# Patient Record
Sex: FEMALE | Race: BLACK OR AFRICAN AMERICAN | NOT HISPANIC OR LATINO | Employment: FULL TIME | URBAN - METROPOLITAN AREA
[De-identification: names, ages, dates, MRNs, and addresses within clinical notes are randomized per-mention and may not be internally consistent; named-entity substitution may affect disease eponyms.]

---

## 2018-08-03 ENCOUNTER — OFFICE VISIT (OUTPATIENT)
Dept: FAMILY MEDICINE | Facility: CLINIC | Age: 48
End: 2018-08-03
Payer: COMMERCIAL

## 2018-08-03 ENCOUNTER — LAB VISIT (OUTPATIENT)
Dept: LAB | Facility: HOSPITAL | Age: 48
End: 2018-08-03
Attending: INTERNAL MEDICINE
Payer: COMMERCIAL

## 2018-08-03 VITALS
HEART RATE: 72 BPM | DIASTOLIC BLOOD PRESSURE: 76 MMHG | RESPIRATION RATE: 17 BRPM | BODY MASS INDEX: 26.82 KG/M2 | WEIGHT: 145.75 LBS | SYSTOLIC BLOOD PRESSURE: 100 MMHG | HEIGHT: 62 IN | OXYGEN SATURATION: 98 % | TEMPERATURE: 98 F

## 2018-08-03 DIAGNOSIS — Z12.4 SCREENING FOR CERVICAL CANCER: ICD-10-CM

## 2018-08-03 DIAGNOSIS — Z13.220 SCREENING FOR HYPERLIPIDEMIA: ICD-10-CM

## 2018-08-03 DIAGNOSIS — E03.9 ACQUIRED HYPOTHYROIDISM: ICD-10-CM

## 2018-08-03 DIAGNOSIS — Z00.00 PREVENTATIVE HEALTH CARE: Primary | ICD-10-CM

## 2018-08-03 DIAGNOSIS — G43.829 MENSTRUAL MIGRAINE WITHOUT STATUS MIGRAINOSUS, NOT INTRACTABLE: ICD-10-CM

## 2018-08-03 DIAGNOSIS — Z12.39 SCREENING FOR BREAST CANCER: ICD-10-CM

## 2018-08-03 LAB
ALBUMIN SERPL BCP-MCNC: 4 G/DL
ALP SERPL-CCNC: 69 U/L
ALT SERPL W/O P-5'-P-CCNC: 18 U/L
ANION GAP SERPL CALC-SCNC: 9 MMOL/L
AST SERPL-CCNC: 28 U/L
BILIRUB SERPL-MCNC: 0.7 MG/DL
BUN SERPL-MCNC: 20 MG/DL
CALCIUM SERPL-MCNC: 9.6 MG/DL
CHLORIDE SERPL-SCNC: 109 MMOL/L
CHOLEST SERPL-MCNC: 241 MG/DL
CHOLEST/HDLC SERPL: 2.8 {RATIO}
CO2 SERPL-SCNC: 21 MMOL/L
CREAT SERPL-MCNC: 1 MG/DL
EST. GFR  (AFRICAN AMERICAN): >60 ML/MIN/1.73 M^2
EST. GFR  (NON AFRICAN AMERICAN): >60 ML/MIN/1.73 M^2
GLUCOSE SERPL-MCNC: 75 MG/DL
HDLC SERPL-MCNC: 87 MG/DL
HDLC SERPL: 36.1 %
LDLC SERPL CALC-MCNC: 142.2 MG/DL
NONHDLC SERPL-MCNC: 154 MG/DL
POTASSIUM SERPL-SCNC: 4 MMOL/L
PROT SERPL-MCNC: 8.5 G/DL
SODIUM SERPL-SCNC: 139 MMOL/L
TRIGL SERPL-MCNC: 59 MG/DL
TSH SERPL DL<=0.005 MIU/L-ACNC: 1.23 UIU/ML

## 2018-08-03 PROCEDURE — 80053 COMPREHEN METABOLIC PANEL: CPT

## 2018-08-03 PROCEDURE — 84443 ASSAY THYROID STIM HORMONE: CPT

## 2018-08-03 PROCEDURE — 80061 LIPID PANEL: CPT

## 2018-08-03 PROCEDURE — 99999 PR PBB SHADOW E&M-NEW PATIENT-LVL IV: CPT | Mod: PBBFAC,,, | Performed by: INTERNAL MEDICINE

## 2018-08-03 PROCEDURE — 36415 COLL VENOUS BLD VENIPUNCTURE: CPT | Mod: PN

## 2018-08-03 PROCEDURE — 99386 PREV VISIT NEW AGE 40-64: CPT | Mod: S$GLB,,, | Performed by: INTERNAL MEDICINE

## 2018-08-03 RX ORDER — SUMATRIPTAN 50 MG/1
50 TABLET, FILM COATED ORAL
COMMUNITY
End: 2018-08-03 | Stop reason: SDUPTHER

## 2018-08-03 RX ORDER — LEVOTHYROXINE SODIUM 175 UG/1
175 TABLET ORAL DAILY
COMMUNITY
End: 2018-08-07 | Stop reason: SDUPTHER

## 2018-08-03 RX ORDER — SERTRALINE HYDROCHLORIDE 100 MG/1
100 TABLET, FILM COATED ORAL DAILY
COMMUNITY
End: 2019-09-20 | Stop reason: SDUPTHER

## 2018-08-03 RX ORDER — SUMATRIPTAN 50 MG/1
50 TABLET, FILM COATED ORAL
Qty: 21 TABLET | Refills: 3 | Status: SHIPPED | OUTPATIENT
Start: 2018-08-03 | End: 2018-12-05 | Stop reason: SDUPTHER

## 2018-08-03 RX ORDER — TOPIRAMATE 100 MG/1
100 TABLET, FILM COATED ORAL 2 TIMES DAILY
COMMUNITY
End: 2019-09-18 | Stop reason: HOSPADM

## 2018-08-03 NOTE — PROGRESS NOTES
"Subjective:       Patient ID: Magalis Rosales is a 47 y.o. female.    Chief Complaint: Establish Care    Est care    HPI: 48 y/o with hypothyroid (on same thyroid dose x 10 years no history of thyroid surgery, biopsy or radiation) migraine headache (two to four times per month on daily topirmate) presents to establish pcp. Feels migraines are well controlled with daily and as needed medication. Pain from migraine unilateral periauricluar. No aura. +photophobia +nausea. Notes menses and allergies as triggers.       Review of Systems   Constitutional: Negative for activity change, appetite change, fatigue, fever and unexpected weight change.   HENT: Negative for ear pain, rhinorrhea and sore throat.    Eyes: Negative for discharge and visual disturbance.   Respiratory: Negative for chest tightness, shortness of breath and wheezing.    Cardiovascular: Negative for chest pain, palpitations and leg swelling.   Gastrointestinal: Negative for abdominal pain, constipation and diarrhea.   Endocrine: Negative for cold intolerance and heat intolerance.   Genitourinary: Negative for dysuria and hematuria.   Musculoskeletal: Negative for joint swelling and neck stiffness.   Skin: Negative for rash.   Neurological: Positive for headaches. Negative for dizziness, syncope and weakness.   Psychiatric/Behavioral: Negative for suicidal ideas.       Objective:     Vitals:    08/03/18 1028   BP: 100/76   BP Location: Left arm   Patient Position: Sitting   BP Method: Medium (Manual)   Pulse: 72   Resp: 17   Temp: 98.4 °F (36.9 °C)   TempSrc: Oral   SpO2: 98%   Weight: 66.1 kg (145 lb 11.6 oz)   Height: 5' 2" (1.575 m)          Physical Exam   Constitutional: She is oriented to person, place, and time. She appears well-developed and well-nourished.   HENT:   Head: Normocephalic and atraumatic.   Eyes: Conjunctivae are normal. Pupils are equal, round, and reactive to light.   Neck: Normal range of motion.   Cardiovascular: Normal rate and " regular rhythm.  Exam reveals no gallop and no friction rub.    No murmur heard.  Pulmonary/Chest: Effort normal and breath sounds normal. She has no wheezes. She has no rales.   Abdominal: Soft. Bowel sounds are normal. There is no tenderness. There is no rebound and no guarding.   Musculoskeletal: Normal range of motion. She exhibits no edema or tenderness.   Neurological: She is alert and oriented to person, place, and time. No cranial nerve deficit or sensory deficit. Coordination normal.   Normal gait observed   Skin: Skin is warm and dry.   Psychiatric: She has a normal mood and affect.       Assessment and Plan   1. Preventative health care  Wear seatbelts at all times    Don't drink and drive    Wear bike helmet and other personal protective equipment when appropriate      2. Acquired hypothyroidism  Clinically euthyroid taking medication on empty stomach with no food or other meds x 30minutes after check tsh for adequacy of control  - TSH; Future    3. Menstrual migraine without status migrainosus, not intractable  Current treatment is effective check LFT's and electroltyes given topiramate use  - Comprehensive metabolic panel; Future  - sumatriptan (IMITREX) 50 MG tablet; Take 1 tablet (50 mg total) by mouth every 2 (two) hours as needed for Migraine.  Dispense: 21 tablet; Refill: 3    4. Screening for hyperlipidemia  Screening lipid panle  - Comprehensive metabolic panel; Future  - Lipid panel; Future    5. Screening for cervical cancer  referal to gyn for pelvic exam  - Ambulatory referral to Obstetrics / Gynecology    6. Screening for breast cancer  mammogram  - Mammo Digital Screening Bilat with CAD; Future

## 2018-08-06 ENCOUNTER — PATIENT MESSAGE (OUTPATIENT)
Dept: FAMILY MEDICINE | Facility: CLINIC | Age: 48
End: 2018-08-06

## 2018-08-06 DIAGNOSIS — E03.9 ACQUIRED HYPOTHYROIDISM: Primary | ICD-10-CM

## 2018-08-07 RX ORDER — LEVOTHYROXINE SODIUM 175 UG/1
175 TABLET ORAL DAILY
Qty: 90 TABLET | Refills: 3 | Status: SHIPPED | OUTPATIENT
Start: 2018-08-07 | End: 2019-02-05 | Stop reason: SDUPTHER

## 2018-08-10 ENCOUNTER — HOSPITAL ENCOUNTER (OUTPATIENT)
Dept: RADIOLOGY | Facility: HOSPITAL | Age: 48
Discharge: HOME | End: 2018-08-10
Attending: INTERNAL MEDICINE
Payer: COMMERCIAL

## 2018-08-10 DIAGNOSIS — Z12.39 SCREENING FOR BREAST CANCER: ICD-10-CM

## 2018-08-10 PROCEDURE — 77067 SCR MAMMO BI INCL CAD: CPT | Mod: 26,,, | Performed by: RADIOLOGY

## 2018-08-10 PROCEDURE — 77063 BREAST TOMOSYNTHESIS BI: CPT | Mod: 26,,, | Performed by: RADIOLOGY

## 2018-08-10 PROCEDURE — 77067 SCR MAMMO BI INCL CAD: CPT | Mod: TC

## 2018-08-27 ENCOUNTER — PATIENT MESSAGE (OUTPATIENT)
Dept: FAMILY MEDICINE | Facility: CLINIC | Age: 48
End: 2018-08-27

## 2018-09-06 ENCOUNTER — PATIENT MESSAGE (OUTPATIENT)
Dept: PSYCHIATRY | Facility: CLINIC | Age: 48
End: 2018-09-06

## 2018-10-08 ENCOUNTER — OFFICE VISIT (OUTPATIENT)
Dept: OBSTETRICS AND GYNECOLOGY | Facility: CLINIC | Age: 48
End: 2018-10-08
Payer: COMMERCIAL

## 2018-10-08 VITALS
BODY MASS INDEX: 27.3 KG/M2 | DIASTOLIC BLOOD PRESSURE: 70 MMHG | RESPIRATION RATE: 16 BRPM | HEART RATE: 60 BPM | HEIGHT: 62 IN | WEIGHT: 148.38 LBS | SYSTOLIC BLOOD PRESSURE: 118 MMHG

## 2018-10-08 DIAGNOSIS — Z01.419 ENCOUNTER FOR WELL WOMAN EXAM WITH ROUTINE GYNECOLOGICAL EXAM: Primary | ICD-10-CM

## 2018-10-08 DIAGNOSIS — Z12.4 ENCOUNTER FOR PAPANICOLAOU SMEAR FOR CERVICAL CANCER SCREENING: ICD-10-CM

## 2018-10-08 DIAGNOSIS — D25.9 UTERINE LEIOMYOMA, UNSPECIFIED LOCATION: ICD-10-CM

## 2018-10-08 PROCEDURE — 88141 CYTOPATH C/V INTERPRET: CPT | Mod: ,,, | Performed by: PATHOLOGY

## 2018-10-08 PROCEDURE — 99386 PREV VISIT NEW AGE 40-64: CPT | Mod: S$GLB,,, | Performed by: OBSTETRICS & GYNECOLOGY

## 2018-10-08 PROCEDURE — 88142 CYTOPATH C/V THIN LAYER: CPT | Performed by: PATHOLOGY

## 2018-10-08 PROCEDURE — 87624 HPV HI-RISK TYP POOLED RSLT: CPT

## 2018-10-08 PROCEDURE — 99999 PR PBB SHADOW E&M-EST. PATIENT-LVL III: CPT | Mod: PBBFAC,,, | Performed by: OBSTETRICS & GYNECOLOGY

## 2018-10-08 NOTE — LETTER
October 8, 2018      Brock Womack MD  [address] [facility] [address]           [facility] - OB/ GYN  [address] [facility] [address]  [facility] [address]  Phone: [phone]          Patient: Magalis Rosales   MR Number: [id number]   YOB: 1970   Date of Visit: 10/8/2018       Dear Dr. Brock Womack:    Thank you for referring Magalis Rosales to me for evaluation. Attached you will find relevant portions of my assessment and plan of care.    If you have questions, please do not hesitate to call me. I look forward to following Magalis Rosales along with you.    Sincerely,    Nesha Coates MD    Enclosure  CC:  No Recipients    If you would like to receive this communication electronically, please contact [email or web address] or [phone] to request more information on [employer] access.    For providers and/or their staff who would like to refer a patient to [facility], please contact us through our one-stop-shop provider referral line, [facility], at [phone].    If you feel you have received this communication in error or would no longer like to receive these types of communications, please e-mail [email or web address]

## 2018-10-08 NOTE — PROGRESS NOTES
SUBJECTIVE:   Magalis Rosales is a 47 y.o. female   for annual well woman exam. No LMP recorded..  She has no unusual complaints.      Contraception: none, not sexually active  Cycles are regular and monthly  Pt with known uterine fibroid since , denies AUB- or pelvic pain    Past Medical History:   Diagnosis Date    History of psychiatric care     History of psychiatric hospitalization     Hypothyroidism     Migraines     Psychiatric exam requested by authority     Psychiatric problem     Therapy      Past Surgical History:   Procedure Laterality Date    APPENDECTOMY       SECTION       Social History     Socioeconomic History    Marital status: Single     Spouse name: Not on file    Number of children: 1    Years of education: Not on file    Highest education level: Not on file   Social Needs    Financial resource strain: Not on file    Food insecurity - worry: Not on file    Food insecurity - inability: Not on file    Transportation needs - medical: Not on file    Transportation needs - non-medical: Not on file   Occupational History    Occupation: Unemployed - substitute teaching   Tobacco Use    Smoking status: Never Smoker    Smokeless tobacco: Never Used   Substance and Sexual Activity    Alcohol use: No    Drug use: No    Sexual activity: Not Currently     Partners: Male   Other Topics Concern    Patient feels they ought to cut down on drinking/drug use Not Asked    Patient annoyed by others criticizing their drinking/drug use Not Asked    Patient has felt bad or guilty about drinking/drug use Not Asked    Patient has had a drink/used drugs as an eye opener in the AM Not Asked   Social History Narrative    Not on file     Family History   Problem Relation Age of Onset    Hypertension Mother     Hypertension Father     Stroke Father      OB History    Para Term  AB Living   1 1 1     1   SAB TAB Ectopic Multiple Live Births           1      #  "Outcome Date GA Lbr Ryley/2nd Weight Sex Delivery Anes PTL Lv   1 Term               Obstetric Comments   Gynhx;  Reg   Not currently sexually active   H/o abnormal pap, s/p laser in 2000, normal pap since then   [facility] 2018 NEG         Current Outpatient Medications   Medication Sig Dispense Refill    levothyroxine (SYNTHROID, LEVOTHROID) 175 MCG tablet Take 1 tablet (175 mcg total) by mouth once daily. 90 tablet 3    sertraline (ZOLOFT) 100 MG tablet Take 100 mg by mouth once daily. Take 1 and half tab by mouth every day      sumatriptan (IMITREX) 50 MG tablet Take 1 tablet (50 mg total) by mouth every 2 (two) hours as needed for Migraine. 21 tablet 3    topiramate (TOPAMAX) 100 MG tablet Take 100 mg by mouth 2 (two) times daily.       No current facility-administered medications for this visit.      Allergies: Patient has no known allergies.       ROS:  GENERAL: Denies weight gain or weight loss. Feeling well overall.   SKIN: Denies rash or lesions.   HEAD: Denies head injury or headache.   NODES: Denies enlarged lymph nodes.   CHEST: Denies chest pain or shortness of breath.   CARDIOVASCULAR: Denies palpitations or left sided chest pain.   ABDOMEN: No abdominal pain, constipation, diarrhea, nausea, vomiting or rectal bleeding.   URINARY: No frequency, dysuria, hematuria, or burning on urination.  REPRODUCTIVE: Denies vaginal discharge, abnormal vaginal bleeding, lesions, pelvic pain  BREASTS: The patient performs breast self-examination and denies pain, lumps, or nipple discharge.   HEMATOLOGIC: No easy bruisability or excessive bleeding.  MUSCULOSKELETAL: Denies joint pain or swelling.   NEUROLOGIC: Denies syncope or weakness.   PSYCHIATRIC: Denies depression, anxiety or mood swings.      OBJECTIVE:   /70   Pulse 60   Resp 16   Ht 5' 2" (1.575 m)   Wt 67.3 kg (148 lb 5.9 oz)   BMI 27.14 kg/m²   The patient appears well, alert, oriented x 3, in no distress.  PSYCH:  Normal, full range of affect  NECK: " negative, no thyromegaly, trachea midline  SKIN: normal, good color, good turgor and no acne, striae, hirsutism  BREAST EXAM: breasts appear normal, no suspicious masses, no skin or nipple changes or axillary nodes  ABDOMEN: soft, non-tender; bowel sounds normal; no masses,  no organomegaly and no hernias, masses, or hepatosplenomegaly  GENITALIA: normal external genitalia, no erythema, no discharge  BLADDER: soft  URETHRA: normal appearing urethra with no masses, tenderness or lesions and normal urethra, normal urethral meatus  VAGINA: Normal  CERVIX: no lesions or cervical motion tenderness  UTERUS: irregular ,mobile,  10 week size,  appx 4-5 cm fibroid palpated, non-tender  ADNEXA: no mass, fullness, tenderness      ASSESSMENT:     1. Health maintenance  -pap done. Discussed ASCCP guideline screening every 3 - 5 years.   -screening [facility] utd  -counseled on exercise and healthy diet, weight loss  2.  Discussed safety at home/school/work, healthy and balanced diet, sleep hygiene, as well as violence/weapons exposure.   3. Fibroid: asymptomatic, will monitor

## 2018-10-12 LAB
HPV HR 12 DNA CVX QL NAA+PROBE: NEGATIVE
HPV16 AG SPEC QL: NEGATIVE
HPV18 DNA SPEC QL NAA+PROBE: NEGATIVE

## 2018-10-15 NOTE — PROGRESS NOTES
Please inform pt of pap shows scant cell, please make an appt to return in one month for repeat pap.   Sorry for the inconvenience.

## 2018-10-16 ENCOUNTER — TELEPHONE (OUTPATIENT)
Dept: OBSTETRICS AND GYNECOLOGY | Facility: CLINIC | Age: 48
End: 2018-10-16

## 2018-11-14 ENCOUNTER — OFFICE VISIT (OUTPATIENT)
Dept: FAMILY MEDICINE | Facility: CLINIC | Age: 48
End: 2018-11-14
Payer: COMMERCIAL

## 2018-11-14 VITALS
TEMPERATURE: 99 F | WEIGHT: 149.69 LBS | BODY MASS INDEX: 27.55 KG/M2 | DIASTOLIC BLOOD PRESSURE: 70 MMHG | OXYGEN SATURATION: 99 % | HEART RATE: 71 BPM | RESPIRATION RATE: 17 BRPM | SYSTOLIC BLOOD PRESSURE: 118 MMHG | HEIGHT: 62 IN

## 2018-11-14 DIAGNOSIS — H66.001 ACUTE SUPPURATIVE OTITIS MEDIA OF RIGHT EAR WITHOUT SPONTANEOUS RUPTURE OF TYMPANIC MEMBRANE, RECURRENCE NOT SPECIFIED: Primary | ICD-10-CM

## 2018-11-14 PROCEDURE — 99214 OFFICE O/P EST MOD 30 MIN: CPT | Mod: S$GLB,,, | Performed by: INTERNAL MEDICINE

## 2018-11-14 PROCEDURE — 3008F BODY MASS INDEX DOCD: CPT | Mod: CPTII,S$GLB,, | Performed by: INTERNAL MEDICINE

## 2018-11-14 PROCEDURE — 99999 PR PBB SHADOW E&M-EST. PATIENT-LVL III: CPT | Mod: PBBFAC,,, | Performed by: INTERNAL MEDICINE

## 2018-11-14 RX ORDER — AMOXICILLIN 500 MG/1
500 CAPSULE ORAL EVERY 8 HOURS
Qty: 21 CAPSULE | Refills: 0 | Status: SHIPPED | OUTPATIENT
Start: 2018-11-14 | End: 2018-11-21

## 2018-11-14 NOTE — PROGRESS NOTES
"Subjective:       Patient ID: Magalis Rosales is a 47 y.o. female.    Chief Complaint: Tinnitus and Establish Care    Otalgia    There is pain in the right ear. This is a new problem. The current episode started in the past 7 days. The problem occurs constantly. The problem has been gradually worsening. There has been no fever. The pain is moderate. Associated symptoms include headaches, rhinorrhea and a sore throat. Pertinent negatives include no abdominal pain, diarrhea or rash. Treatments tried: nasal steroid yesterday helped.     Review of Systems   Constitutional: Negative for activity change, appetite change, fatigue, fever and unexpected weight change.   HENT: Positive for ear pain, rhinorrhea and sore throat.    Eyes: Negative for discharge and visual disturbance.   Respiratory: Negative for chest tightness, shortness of breath and wheezing.    Cardiovascular: Negative for chest pain, palpitations and leg swelling.   Gastrointestinal: Negative for abdominal pain, constipation and diarrhea.   Endocrine: Negative for cold intolerance and heat intolerance.   Genitourinary: Negative for dysuria and hematuria.   Musculoskeletal: Negative for joint swelling and neck stiffness.   Skin: Negative for rash.   Neurological: Positive for headaches. Negative for dizziness, syncope and weakness.   Psychiatric/Behavioral: Negative for suicidal ideas.       Objective:     Vitals:    11/14/18 1614   BP: 118/70   BP Location: Right arm   Patient Position: Sitting   Pulse: 71   Resp: 17   Temp: 98.8 °F (37.1 °C)   TempSrc: Oral   SpO2: 99%   Weight: 67.9 kg (149 lb 11.1 oz)   Height: 5' 2" (1.575 m)          Physical Exam   Constitutional: She is oriented to person, place, and time. She appears well-developed and well-nourished.   HENT:   Head: Normocephalic and atraumatic.   Right Ear: Tympanic membrane is injected and erythematous. Tympanic membrane is not perforated. A middle ear effusion is present.   Left Ear: Tympanic " membrane normal.   Nose: Mucosal edema and rhinorrhea present.   Eyes: Conjunctivae are normal.   Neck: Normal range of motion.   Cardiovascular: Normal rate and regular rhythm. Exam reveals no gallop and no friction rub.   No murmur heard.  Pulmonary/Chest: Effort normal and breath sounds normal. She has no wheezes. She has no rales.   Abdominal: Soft. Bowel sounds are normal. There is no tenderness. There is no rebound and no guarding.   Musculoskeletal: Normal range of motion. She exhibits no edema or tenderness.   Neurological: She is alert and oriented to person, place, and time. No cranial nerve deficit.   Skin: Skin is warm and dry.   Psychiatric: She has a normal mood and affect.       Assessment and Plan   1. Acute suppurative otitis media of right ear without spontaneous rupture of tympanic membrane, recurrence not specified  Antibiotics x seven days nasal steroid bid   - amoxicillin (AMOXIL) 500 MG capsule; Take 1 capsule (500 mg total) by mouth every 8 (eight) hours. for 7 days  Dispense: 21 capsule; Refill: 0

## 2018-12-05 DIAGNOSIS — G43.829 MENSTRUAL MIGRAINE WITHOUT STATUS MIGRAINOSUS, NOT INTRACTABLE: ICD-10-CM

## 2018-12-05 RX ORDER — SUMATRIPTAN 50 MG/1
TABLET, FILM COATED ORAL
Qty: 9 TABLET | Refills: 2 | Status: SHIPPED | OUTPATIENT
Start: 2018-12-05 | End: 2019-02-04 | Stop reason: SDUPTHER

## 2019-02-04 DIAGNOSIS — E03.9 ACQUIRED HYPOTHYROIDISM: ICD-10-CM

## 2019-02-04 DIAGNOSIS — G43.829 MENSTRUAL MIGRAINE WITHOUT STATUS MIGRAINOSUS, NOT INTRACTABLE: ICD-10-CM

## 2019-02-05 RX ORDER — SUMATRIPTAN 50 MG/1
TABLET, FILM COATED ORAL
Qty: 15 TABLET | Refills: 2 | Status: SHIPPED | OUTPATIENT
Start: 2019-02-05 | End: 2019-05-15 | Stop reason: SDUPTHER

## 2019-02-05 RX ORDER — LEVOTHYROXINE SODIUM 175 UG/1
175 TABLET ORAL DAILY
Qty: 90 TABLET | Refills: 3 | Status: SHIPPED | OUTPATIENT
Start: 2019-02-05 | End: 2019-09-20 | Stop reason: SDUPTHER

## 2019-02-05 RX ORDER — SUMATRIPTAN 50 MG/1
TABLET, FILM COATED ORAL
Qty: 9 TABLET | Refills: 1 | OUTPATIENT
Start: 2019-02-05

## 2019-02-19 ENCOUNTER — OFFICE VISIT (OUTPATIENT)
Dept: FAMILY MEDICINE | Facility: CLINIC | Age: 49
End: 2019-02-19
Payer: COMMERCIAL

## 2019-02-19 VITALS
BODY MASS INDEX: 28.03 KG/M2 | HEART RATE: 60 BPM | HEIGHT: 62 IN | TEMPERATURE: 99 F | SYSTOLIC BLOOD PRESSURE: 130 MMHG | WEIGHT: 152.31 LBS | RESPIRATION RATE: 17 BRPM | DIASTOLIC BLOOD PRESSURE: 77 MMHG | OXYGEN SATURATION: 99 %

## 2019-02-19 DIAGNOSIS — M25.561 ACUTE PAIN OF RIGHT KNEE: Primary | ICD-10-CM

## 2019-02-19 DIAGNOSIS — M22.2X1 PATELLOFEMORAL DISORDER OF RIGHT KNEE: ICD-10-CM

## 2019-02-19 PROCEDURE — 99999 PR PBB SHADOW E&M-EST. PATIENT-LVL III: CPT | Mod: PBBFAC,,, | Performed by: INTERNAL MEDICINE

## 2019-02-19 PROCEDURE — 99213 PR OFFICE/OUTPT VISIT, EST, LEVL III, 20-29 MIN: ICD-10-PCS | Mod: S$GLB,,, | Performed by: INTERNAL MEDICINE

## 2019-02-19 PROCEDURE — 99999 PR PBB SHADOW E&M-EST. PATIENT-LVL III: ICD-10-PCS | Mod: PBBFAC,,, | Performed by: INTERNAL MEDICINE

## 2019-02-19 PROCEDURE — 3008F PR BODY MASS INDEX (BMI) DOCUMENTED: ICD-10-PCS | Mod: CPTII,S$GLB,, | Performed by: INTERNAL MEDICINE

## 2019-02-19 PROCEDURE — 3008F BODY MASS INDEX DOCD: CPT | Mod: CPTII,S$GLB,, | Performed by: INTERNAL MEDICINE

## 2019-02-19 PROCEDURE — 99213 OFFICE O/P EST LOW 20 MIN: CPT | Mod: S$GLB,,, | Performed by: INTERNAL MEDICINE

## 2019-02-20 NOTE — PROGRESS NOTES
"Subjective:       Patient ID: Magalis Rosales is a 48 y.o. female.    Chief Complaint: Knee Pain (one week ) and Establish Care    Knee pain    HPI: 49 y/o w/ hypothryoid presents to discuss right knee pain. Was running one week ago. After completing run felt medail knee pain suprapatellar. Some relief with OTC ibuprofen. Had no further pain until after run four days ago with same pain along medial aspect. Less relief thsi time with NSAID. Following day have pain going upstairs. No sensation of knee giving out no radiation of pain below knee. No motor weakness no fall. No history of knee surgery or injection. No pain currently       Review of Systems   Constitutional: Negative for activity change, appetite change, fatigue, fever and unexpected weight change.   HENT: Negative for ear pain, rhinorrhea and sore throat.    Eyes: Negative for discharge and visual disturbance.   Respiratory: Negative for chest tightness, shortness of breath and wheezing.    Cardiovascular: Negative for chest pain, palpitations and leg swelling.   Gastrointestinal: Negative for abdominal pain, constipation and diarrhea.   Endocrine: Negative for cold intolerance and heat intolerance.   Genitourinary: Negative for dysuria and hematuria.   Musculoskeletal: Positive for arthralgias. Negative for joint swelling and neck stiffness.   Skin: Negative for rash.   Neurological: Negative for dizziness, syncope, weakness and headaches.   Psychiatric/Behavioral: Negative for suicidal ideas.       Objective:     Vitals:    02/19/19 1613   BP: 130/77   BP Location: Right arm   Patient Position: Sitting   Pulse: 60   Resp: 17   Temp: 99 °F (37.2 °C)   TempSrc: Oral   SpO2: 99%   Weight: 69.1 kg (152 lb 5.4 oz)   Height: 5' 2" (1.575 m)          Physical Exam   Constitutional: She is oriented to person, place, and time. She appears well-developed and well-nourished.   HENT:   Head: Normocephalic and atraumatic.   Eyes: Conjunctivae are normal.   Neck: " Normal range of motion.   Cardiovascular: Normal rate and regular rhythm. Exam reveals no gallop and no friction rub.   No murmur heard.  Pulmonary/Chest: Effort normal and breath sounds normal. She has no wheezes. She has no rales.   Musculoskeletal: Normal range of motion. She exhibits tenderness. She exhibits no edema.   No laxity to varus/valgus testing some tenderness of medial quadricep tendon insert. Negative anterior/posterior drawer testing with firm end point   Neurological: She is alert and oriented to person, place, and time. No cranial nerve deficit.   Normal gait observed   Skin: Skin is warm and dry.   Psychiatric: She has a normal mood and affect.       Assessment and Plan   1. Acute pain of right knee    2. Patellofemoral disorder of right knee  Suspect PFS icing after running PRN NSAIDs contact clinic if develop any worsening pain or instability

## 2019-03-31 ENCOUNTER — PATIENT MESSAGE (OUTPATIENT)
Dept: FAMILY MEDICINE | Facility: CLINIC | Age: 49
End: 2019-03-31

## 2019-04-01 ENCOUNTER — TELEPHONE (OUTPATIENT)
Dept: FAMILY MEDICINE | Facility: CLINIC | Age: 49
End: 2019-04-01

## 2019-04-01 NOTE — TELEPHONE ENCOUNTER
Called pt no answer mailbox full  to schedule appt with erica for muscle spasms pt appt on 07/08/2019 at am sending appt by mail

## 2019-04-08 ENCOUNTER — LAB VISIT (OUTPATIENT)
Dept: LAB | Facility: HOSPITAL | Age: 49
End: 2019-04-08
Attending: NURSE PRACTITIONER
Payer: COMMERCIAL

## 2019-04-08 ENCOUNTER — OFFICE VISIT (OUTPATIENT)
Dept: FAMILY MEDICINE | Facility: CLINIC | Age: 49
End: 2019-04-08
Payer: COMMERCIAL

## 2019-04-08 VITALS
HEART RATE: 61 BPM | BODY MASS INDEX: 28.72 KG/M2 | OXYGEN SATURATION: 99 % | WEIGHT: 156.06 LBS | TEMPERATURE: 98 F | SYSTOLIC BLOOD PRESSURE: 114 MMHG | DIASTOLIC BLOOD PRESSURE: 78 MMHG | HEIGHT: 62 IN | RESPIRATION RATE: 16 BRPM

## 2019-04-08 DIAGNOSIS — G51.4 FACIAL TWITCHING: Primary | ICD-10-CM

## 2019-04-08 DIAGNOSIS — G51.4 FACIAL TWITCHING: ICD-10-CM

## 2019-04-08 LAB
ANION GAP SERPL CALC-SCNC: 6 MMOL/L (ref 8–16)
BUN SERPL-MCNC: 16 MG/DL (ref 6–20)
CALCIUM SERPL-MCNC: 8.8 MG/DL (ref 8.7–10.5)
CHLORIDE SERPL-SCNC: 112 MMOL/L (ref 95–110)
CO2 SERPL-SCNC: 19 MMOL/L (ref 23–29)
CREAT SERPL-MCNC: 0.9 MG/DL (ref 0.5–1.4)
EST. GFR  (AFRICAN AMERICAN): >60 ML/MIN/1.73 M^2
EST. GFR  (NON AFRICAN AMERICAN): >60 ML/MIN/1.73 M^2
GLUCOSE SERPL-MCNC: 72 MG/DL (ref 70–110)
POTASSIUM SERPL-SCNC: 3.6 MMOL/L (ref 3.5–5.1)
SODIUM SERPL-SCNC: 137 MMOL/L (ref 136–145)

## 2019-04-08 PROCEDURE — 99999 PR PBB SHADOW E&M-EST. PATIENT-LVL III: CPT | Mod: PBBFAC,,, | Performed by: NURSE PRACTITIONER

## 2019-04-08 PROCEDURE — 99214 OFFICE O/P EST MOD 30 MIN: CPT | Mod: S$GLB,,, | Performed by: NURSE PRACTITIONER

## 2019-04-08 PROCEDURE — 99999 PR PBB SHADOW E&M-EST. PATIENT-LVL III: ICD-10-PCS | Mod: PBBFAC,,, | Performed by: NURSE PRACTITIONER

## 2019-04-08 PROCEDURE — 80048 BASIC METABOLIC PNL TOTAL CA: CPT

## 2019-04-08 PROCEDURE — 3008F PR BODY MASS INDEX (BMI) DOCUMENTED: ICD-10-PCS | Mod: CPTII,S$GLB,, | Performed by: NURSE PRACTITIONER

## 2019-04-08 PROCEDURE — 3008F BODY MASS INDEX DOCD: CPT | Mod: CPTII,S$GLB,, | Performed by: NURSE PRACTITIONER

## 2019-04-08 PROCEDURE — 99214 PR OFFICE/OUTPT VISIT, EST, LEVL IV, 30-39 MIN: ICD-10-PCS | Mod: S$GLB,,, | Performed by: NURSE PRACTITIONER

## 2019-04-08 PROCEDURE — 36415 COLL VENOUS BLD VENIPUNCTURE: CPT

## 2019-04-08 NOTE — PROGRESS NOTES
Routine Office Visit    Patient Name: Magalis Rosales    : 1970  MRN: [id number]    Chief Complaint:  Spasms    Subjective:  Magalis is a 48 y.o. female who presents today for:    1. Facial Spasms - patient reports today with facial spasms.  She states that for the past couple weeks she has been having muscle spasms on the right side of the face that starts in the right eye and goes down to the chin.  She states that this happens when she puts makeup on and also happens when she goes to lay down at night.  She denies pain or itching to the face but reports the symptoms as a muscle twitch.  Denies fevers or chills.  Of note she states that she does have trouble sleeping, and only gets 3-4 hours of sleep per night.  She states that she also only drinks 2 bottles of water daily.  She denies trouble seeing, or denies trouble swallowing.    Past Medical History  Past Medical History:   Diagnosis Date    History of psychiatric care     History of psychiatric hospitalization     Hypothyroidism     Migraines     Psychiatric exam requested by authority     Psychiatric problem     Therapy        Past Surgical History  Past Surgical History:   Procedure Laterality Date    APPENDECTOMY       SECTION         Family History  Family History   Problem Relation Age of Onset    Hypertension Mother     Hypertension Father     Stroke Father        Social History  Social History     Socioeconomic History    Marital status: Single     Spouse name: Not on file    Number of children: 1    Years of education: Not on file    Highest education level: Not on file   Occupational History    Occupation: Unemployed - substitute teaching   Social Needs    Financial resource strain: Not on file    Food insecurity:     Worry: Not on file     Inability: Not on file    Transportation needs:     Medical: Not on file     Non-medical: Not on file   Tobacco Use    Smoking status: Never Smoker    Smokeless tobacco: Never  Used   Substance and Sexual Activity    Alcohol use: No    Drug use: No    Sexual activity: Not Currently     Partners: Male   Lifestyle    Physical activity:     Days per week: Not on file     Minutes per session: Not on file    Stress: Not on file   Relationships    Social connections:     Talks on phone: Not on file     Gets together: Not on file     Attends Anabaptism service: Not on file     Active member of club or organization: Not on file     Attends meetings of clubs or organizations: Not on file     Relationship status: Not on file   Other Topics Concern    Patient feels they ought to cut down on drinking/drug use Not Asked    Patient annoyed by others criticizing their drinking/drug use Not Asked    Patient has felt bad or guilty about drinking/drug use Not Asked    Patient has had a drink/used drugs as an eye opener in the AM Not Asked   Social History Narrative    Not on file       Current Medications  Current Outpatient Medications on File Prior to Visit   Medication Sig Dispense Refill    levothyroxine (SYNTHROID, LEVOTHROID) 175 MCG tablet Take 1 tablet (175 mcg total) by mouth once daily. 90 tablet 3    sertraline (ZOLOFT) 100 MG tablet Take 100 mg by mouth once daily. Take 1 and half tab by mouth every day      sumatriptan (IMITREX) 50 MG tablet TAKE 1 TABLET BY MOUTH AT ONSET OF HEADACHE. MAY REPEAT IN 2 HOURS. MAX 4/DAY 15 tablet 2    topiramate (TOPAMAX) 100 MG tablet Take 100 mg by mouth 2 (two) times daily.       No current facility-administered medications on file prior to visit.        Allergies   Review of patient's allergies indicates:  No Known Allergies    Review of Systems (Pertinent positives)  Review of Systems   Constitutional: Negative for chills, diaphoresis, fever, malaise/fatigue and weight loss.   HENT: Negative for congestion, ear discharge, ear pain, hearing loss, nosebleeds, sinus pain, sore throat and tinnitus.         Facial twitching   Eyes: Negative for  "blurred vision, double vision, photophobia, pain, discharge and redness.   Respiratory: Negative.  Negative for stridor.    Cardiovascular: Negative.    Gastrointestinal: Negative.    Genitourinary: Negative.    Musculoskeletal: Negative.    Skin: Negative.    Neurological: Negative for dizziness, tingling, tremors, sensory change, speech change, focal weakness, seizures, loss of consciousness, weakness and headaches.   Endo/Heme/Allergies: Negative.    Psychiatric/Behavioral: Negative.        /78 (BP Location: Right arm, Patient Position: Sitting, BP Method: Medium (Manual))   Pulse 61   Temp 98.2 °F (36.8 °C) (Oral)   Resp 16   Ht 5' 2" (1.575 m)   Wt 70.8 kg (156 lb 1.4 oz)   SpO2 99%   BMI 28.55 kg/m²     Physical Exam   Constitutional: She is oriented to person, place, and time. She appears well-developed and well-nourished. No distress.   HENT:   Head: Normocephalic and atraumatic.   Right Ear: Tympanic membrane, external ear and ear canal normal.   Left Ear: Tympanic membrane, external ear and ear canal normal.   Nose: No mucosal edema or rhinorrhea. Right sinus exhibits no maxillary sinus tenderness and no frontal sinus tenderness. Left sinus exhibits no maxillary sinus tenderness and no frontal sinus tenderness.   Mouth/Throat: Uvula is midline, oropharynx is clear and moist and mucous membranes are normal.   Eyes: Pupils are equal, round, and reactive to light. Conjunctivae and EOM are normal. Right eye exhibits no discharge. Left eye exhibits no discharge. No scleral icterus.   Neck: Normal range of motion. Neck supple. No JVD present. No tracheal deviation present. No thyromegaly present.   Cardiovascular: Normal rate, regular rhythm, normal heart sounds and intact distal pulses. Exam reveals no gallop and no friction rub.   No murmur heard.  Pulmonary/Chest: Effort normal and breath sounds normal. No stridor. No respiratory distress. She has no wheezes. She has no rales. She exhibits no " tenderness.   Abdominal: Soft. Bowel sounds are normal. She exhibits no distension and no mass. There is no tenderness. There is no rebound and no guarding. No hernia.   Musculoskeletal: Normal range of motion.   Lymphadenopathy:     She has no cervical adenopathy.   Neurological: She is alert and oriented to person, place, and time. She displays normal reflexes. No cranial nerve deficit or sensory deficit. She exhibits normal muscle tone. Coordination normal.   No facial droop, symmetric facial movements, cranial nerves intact   Skin: Skin is warm and dry. Capillary refill takes less than 2 seconds. She is not diaphoretic.        Assessment/Plan:  Magalis Rosales is a 48 y.o. female who presents today for :    Magalis was seen today for spasms.    Diagnoses and all orders for this visit:    Facial twitching  -     Basic metabolic panel; Future     Exam is completely normal.  She has normal extraocular movements.  Her neuro exam is normal.  Check BMP to evaluate electrolytes.  Patient was instructed to increase water intake to 3-4 bottles per day.  Adequate sleep hygiene was discussed, including not watching TV in bed, not surfing the Internet on the smart phone while in bed, as well as only using the bed for sleeping, not napping during the day.  Follow-up if no relief in symptoms with these measures.  Patient was instructed that if she experiences any fevers or chills, facial, trouble with vision, or any other neurological symptoms she is to call the clinic or go straight to the emergency room.  Patient verbalized understanding of these instructions.        This office note has been dictated.  This dictation has been generated using M-Modal Fluency Direct dictation; some phonetic errors may occur.   My collaborating physician is Dr. Tony Shanks.

## 2019-04-09 ENCOUNTER — TELEPHONE (OUTPATIENT)
Dept: FAMILY MEDICINE | Facility: CLINIC | Age: 49
End: 2019-04-09

## 2019-04-09 NOTE — TELEPHONE ENCOUNTER
Called pt and informed her mother Ms. Joanie Rosales, that the pt lab results were normal.    ----- Message from Juan Francisco Graves NP sent at 4/9/2019  8:02 AM CDT -----  Please call patient and let her know her labs are normal.

## 2019-04-10 ENCOUNTER — PATIENT MESSAGE (OUTPATIENT)
Dept: FAMILY MEDICINE | Facility: CLINIC | Age: 49
End: 2019-04-10

## 2019-04-11 ENCOUNTER — TELEPHONE (OUTPATIENT)
Dept: FAMILY MEDICINE | Facility: CLINIC | Age: 49
End: 2019-04-11

## 2019-04-11 DIAGNOSIS — M62.838 MUSCLE SPASM: Primary | ICD-10-CM

## 2019-04-11 RX ORDER — TIZANIDINE 4 MG/1
4 TABLET ORAL NIGHTLY PRN
Qty: 15 TABLET | Refills: 0 | Status: SHIPPED | OUTPATIENT
Start: 2019-04-11 | End: 2019-04-21

## 2019-04-11 NOTE — TELEPHONE ENCOUNTER
----- Message from Mary Green sent at 4/11/2019 12:47 PM CDT -----  Contact: Self  Type: RX Refill Request    Who Called: Magalis Rosales    Refill or New Rx:Refill    RX Name and Strength:tiZANidine (ZANAFLEX) 4 MG tablet 15 tablet  sumatriptan (IMITREX) 50 MG tablet 15 tablet     How is the patient currently taking it? (ex. 1XDay):1 X day    Is this a 30 day or 90 day RX:30    Preferred Pharmacy with phone number:[id number] - [place], [place] - [address] [facility] [phone] (Phone)     Local or Mail Order:Local    Ordering Provider:    Would the patient rather a call back or a response via [facility]? Call back    Best Call Back Number:[phone]    Additional Information:

## 2019-04-12 ENCOUNTER — TELEPHONE (OUTPATIENT)
Dept: FAMILY MEDICINE | Facility: CLINIC | Age: 49
End: 2019-04-12

## 2019-04-12 NOTE — TELEPHONE ENCOUNTER
----- Message from Juan Francisco Graves NP sent at 4/9/2019  8:02 AM CDT -----  Please call patient and let her know her labs are normal.

## 2019-04-15 ENCOUNTER — TELEPHONE (OUTPATIENT)
Dept: FAMILY MEDICINE | Facility: CLINIC | Age: 49
End: 2019-04-15

## 2019-04-22 ENCOUNTER — PATIENT MESSAGE (OUTPATIENT)
Dept: FAMILY MEDICINE | Facility: CLINIC | Age: 49
End: 2019-04-22

## 2019-04-22 DIAGNOSIS — R25.3 MUSCLE TWITCHING: Primary | ICD-10-CM

## 2019-04-23 ENCOUNTER — PATIENT MESSAGE (OUTPATIENT)
Dept: FAMILY MEDICINE | Facility: CLINIC | Age: 49
End: 2019-04-23

## 2019-04-26 ENCOUNTER — PATIENT MESSAGE (OUTPATIENT)
Dept: FAMILY MEDICINE | Facility: CLINIC | Age: 49
End: 2019-04-26

## 2019-05-15 DIAGNOSIS — G43.829 MENSTRUAL MIGRAINE WITHOUT STATUS MIGRAINOSUS, NOT INTRACTABLE: ICD-10-CM

## 2019-05-15 RX ORDER — SUMATRIPTAN 50 MG/1
TABLET, FILM COATED ORAL
Qty: 15 TABLET | Refills: 0 | Status: SHIPPED | OUTPATIENT
Start: 2019-05-15 | End: 2019-06-23 | Stop reason: SDUPTHER

## 2019-05-15 RX ORDER — SUMATRIPTAN 50 MG/1
TABLET, FILM COATED ORAL
Qty: 15 TABLET | Refills: 0 | OUTPATIENT
Start: 2019-05-15

## 2019-05-15 NOTE — TELEPHONE ENCOUNTER
----- Message from Uri Calderon sent at 5/15/2019  4:06 PM CDT -----  Contact: Self: [phone]  Type: RX Refill Request    Who Called: Magalis Rosales    Refill or New Rx:  Refill    RX Name and Strength:sumatriptan (IMITREX) 50 MG tablet    How is the patient currently taking it? (As needed)    Is this a 30 day or 90 day RX:9 every 2 month    Preferred Pharmacy with phone number:..  [id number] - [place] - [address] [facility]  [address] [place] [address]  Phone: [phone] Fax: [phone]        Local or Mail Order:Local    Ordering Provider:Dr. Womack    Would the patient rather a call back or a response via [facility]? Callback    Best Call Back Number: [phone]    Additional Information: Patient is completely out of medication.

## 2019-06-23 DIAGNOSIS — G43.829 MENSTRUAL MIGRAINE WITHOUT STATUS MIGRAINOSUS, NOT INTRACTABLE: ICD-10-CM

## 2019-06-24 RX ORDER — SUMATRIPTAN 50 MG/1
TABLET, FILM COATED ORAL
Qty: 15 TABLET | Refills: 0 | Status: SHIPPED | OUTPATIENT
Start: 2019-06-24 | End: 2019-06-28 | Stop reason: SDUPTHER

## 2019-06-24 RX ORDER — SUMATRIPTAN 50 MG/1
TABLET, FILM COATED ORAL
Qty: 15 TABLET | Refills: 0 | OUTPATIENT
Start: 2019-06-24

## 2019-06-28 DIAGNOSIS — G43.829 MENSTRUAL MIGRAINE WITHOUT STATUS MIGRAINOSUS, NOT INTRACTABLE: ICD-10-CM

## 2019-06-28 RX ORDER — SUMATRIPTAN 50 MG/1
TABLET, FILM COATED ORAL
Qty: 21 TABLET | Refills: 1 | Status: SHIPPED | OUTPATIENT
Start: 2019-06-28 | End: 2019-09-20 | Stop reason: SDUPTHER

## 2019-07-01 ENCOUNTER — PATIENT MESSAGE (OUTPATIENT)
Dept: FAMILY MEDICINE | Facility: CLINIC | Age: 49
End: 2019-07-01

## 2019-09-10 ENCOUNTER — PATIENT MESSAGE (OUTPATIENT)
Dept: FAMILY MEDICINE | Facility: CLINIC | Age: 49
End: 2019-09-10

## 2019-09-17 ENCOUNTER — NURSE TRIAGE (OUTPATIENT)
Dept: ADMINISTRATIVE | Facility: CLINIC | Age: 49
End: 2019-09-17

## 2019-09-17 ENCOUNTER — DOCUMENTATION ONLY (OUTPATIENT)
Dept: RADIOLOGY | Facility: HOSPITAL | Age: 49
End: 2019-09-17

## 2019-09-17 ENCOUNTER — HOSPITAL ENCOUNTER (OUTPATIENT)
Facility: HOSPITAL | Age: 49
Discharge: HOME | End: 2019-09-18
Attending: EMERGENCY MEDICINE | Admitting: EMERGENCY MEDICINE
Payer: COMMERCIAL

## 2019-09-17 DIAGNOSIS — G43.809 OTHER MIGRAINE WITHOUT STATUS MIGRAINOSUS, NOT INTRACTABLE: ICD-10-CM

## 2019-09-17 DIAGNOSIS — R47.1 DYSARTHRIA: Primary | ICD-10-CM

## 2019-09-17 DIAGNOSIS — G45.9 TIA (TRANSIENT ISCHEMIC ATTACK): ICD-10-CM

## 2019-09-17 PROBLEM — G43.909 MIGRAINES: Status: ACTIVE | Noted: 2019-09-17

## 2019-09-17 PROBLEM — E03.9 HYPOTHYROID: Status: ACTIVE | Noted: 2019-09-17

## 2019-09-17 LAB
ALBUMIN SERPL BCP-MCNC: 3.6 G/DL (ref 3.5–5.2)
ALP SERPL-CCNC: 78 U/L (ref 55–135)
ALT SERPL W/O P-5'-P-CCNC: 23 U/L (ref 10–44)
ANION GAP SERPL CALC-SCNC: 8 MMOL/L (ref 8–16)
ANISOCYTOSIS BLD QL SMEAR: SLIGHT
AST SERPL-CCNC: 39 U/L (ref 10–40)
BACTERIA #/AREA URNS HPF: ABNORMAL /HPF
BASOPHILS # BLD AUTO: 0.05 K/UL (ref 0–0.2)
BASOPHILS NFR BLD: 0.6 % (ref 0–1.9)
BILIRUB SERPL-MCNC: 0.2 MG/DL (ref 0.1–1)
BILIRUB UR QL STRIP: NEGATIVE
BUN SERPL-MCNC: 14 MG/DL (ref 6–20)
CALCIUM SERPL-MCNC: 8.7 MG/DL (ref 8.7–10.5)
CHLORIDE SERPL-SCNC: 107 MMOL/L (ref 95–110)
CLARITY UR: ABNORMAL
CO2 SERPL-SCNC: 23 MMOL/L (ref 23–29)
COLOR UR: YELLOW
CREAT SERPL-MCNC: 1 MG/DL (ref 0.5–1.4)
DIFFERENTIAL METHOD: ABNORMAL
EOSINOPHIL # BLD AUTO: 0.1 K/UL (ref 0–0.5)
EOSINOPHIL NFR BLD: 0.9 % (ref 0–8)
ERYTHROCYTE [DISTWIDTH] IN BLOOD BY AUTOMATED COUNT: 19.3 % (ref 11.5–14.5)
EST. GFR  (AFRICAN AMERICAN): >60 ML/MIN/1.73 M^2
EST. GFR  (NON AFRICAN AMERICAN): >60 ML/MIN/1.73 M^2
GLUCOSE SERPL-MCNC: 68 MG/DL (ref 70–110)
GLUCOSE UR QL STRIP: NEGATIVE
HCT VFR BLD AUTO: 31.9 % (ref 37–48.5)
HGB BLD-MCNC: 9.5 G/DL (ref 12–16)
HGB UR QL STRIP: NEGATIVE
KETONES UR QL STRIP: NEGATIVE
LEUKOCYTE ESTERASE UR QL STRIP: ABNORMAL
LYMPHOCYTES # BLD AUTO: 2.5 K/UL (ref 1–4.8)
LYMPHOCYTES NFR BLD: 28.2 % (ref 18–48)
MAGNESIUM SERPL-MCNC: 2 MG/DL (ref 1.6–2.6)
MCH RBC QN AUTO: 22.9 PG (ref 27–31)
MCHC RBC AUTO-ENTMCNC: 29.8 G/DL (ref 32–36)
MCV RBC AUTO: 77 FL (ref 82–98)
MICROSCOPIC COMMENT: ABNORMAL
MONOCYTES # BLD AUTO: 0.7 K/UL (ref 0.3–1)
MONOCYTES NFR BLD: 7.9 % (ref 4–15)
NEUTROPHILS # BLD AUTO: 5.4 K/UL (ref 1.8–7.7)
NEUTROPHILS NFR BLD: 62.4 % (ref 38–73)
NITRITE UR QL STRIP: NEGATIVE
PH UR STRIP: 6 [PH] (ref 5–8)
PLATELET # BLD AUTO: 307 K/UL (ref 150–350)
PMV BLD AUTO: 11.8 FL (ref 9.2–12.9)
POCT GLUCOSE: 71 MG/DL (ref 70–110)
POTASSIUM SERPL-SCNC: 3.7 MMOL/L (ref 3.5–5.1)
PROT SERPL-MCNC: 7.8 G/DL (ref 6–8.4)
PROT UR QL STRIP: NEGATIVE
RBC # BLD AUTO: 4.15 M/UL (ref 4–5.4)
SODIUM SERPL-SCNC: 138 MMOL/L (ref 136–145)
SP GR UR STRIP: 1.02 (ref 1–1.03)
T4 FREE SERPL-MCNC: 0.87 NG/DL (ref 0.71–1.51)
TSH SERPL DL<=0.005 MIU/L-ACNC: 12.74 UIU/ML (ref 0.4–4)
URN SPEC COLLECT METH UR: ABNORMAL
UROBILINOGEN UR STRIP-ACNC: NEGATIVE EU/DL
WBC # BLD AUTO: 8.72 K/UL (ref 3.9–12.7)
WBC #/AREA URNS HPF: 1 /HPF (ref 0–5)

## 2019-09-17 PROCEDURE — 83735 ASSAY OF MAGNESIUM: CPT

## 2019-09-17 PROCEDURE — G0378 HOSPITAL OBSERVATION PER HR: HCPCS

## 2019-09-17 PROCEDURE — 80053 COMPREHEN METABOLIC PANEL: CPT

## 2019-09-17 PROCEDURE — 99285 EMERGENCY DEPT VISIT HI MDM: CPT | Mod: 25

## 2019-09-17 PROCEDURE — 25000003 PHARM REV CODE 250: Performed by: EMERGENCY MEDICINE

## 2019-09-17 PROCEDURE — 84443 ASSAY THYROID STIM HORMONE: CPT

## 2019-09-17 PROCEDURE — 96374 THER/PROPH/DIAG INJ IV PUSH: CPT

## 2019-09-17 PROCEDURE — 85025 COMPLETE CBC W/AUTO DIFF WBC: CPT

## 2019-09-17 PROCEDURE — 84439 ASSAY OF FREE THYROXINE: CPT

## 2019-09-17 PROCEDURE — 25000003 PHARM REV CODE 250: Performed by: PHYSICIAN ASSISTANT

## 2019-09-17 PROCEDURE — 93005 ELECTROCARDIOGRAM TRACING: CPT

## 2019-09-17 PROCEDURE — 93010 ELECTROCARDIOGRAM REPORT: CPT | Mod: ,,, | Performed by: INTERNAL MEDICINE

## 2019-09-17 PROCEDURE — 81000 URINALYSIS NONAUTO W/SCOPE: CPT

## 2019-09-17 PROCEDURE — 93010 EKG 12-LEAD: ICD-10-PCS | Mod: ,,, | Performed by: INTERNAL MEDICINE

## 2019-09-17 PROCEDURE — 63600175 PHARM REV CODE 636 W HCPCS: Performed by: EMERGENCY MEDICINE

## 2019-09-17 PROCEDURE — 96375 TX/PRO/DX INJ NEW DRUG ADDON: CPT

## 2019-09-17 PROCEDURE — 96361 HYDRATE IV INFUSION ADD-ON: CPT

## 2019-09-17 PROCEDURE — 82962 GLUCOSE BLOOD TEST: CPT

## 2019-09-17 RX ORDER — IBUPROFEN 200 MG
24 TABLET ORAL
Status: DISCONTINUED | OUTPATIENT
Start: 2019-09-17 | End: 2019-09-18 | Stop reason: HOSPADM

## 2019-09-17 RX ORDER — ROSUVASTATIN CALCIUM 10 MG/1
20 TABLET, COATED ORAL DAILY
Status: DISCONTINUED | OUTPATIENT
Start: 2019-09-18 | End: 2019-09-17

## 2019-09-17 RX ORDER — KETOROLAC TROMETHAMINE 30 MG/ML
30 INJECTION, SOLUTION INTRAMUSCULAR; INTRAVENOUS
Status: COMPLETED | OUTPATIENT
Start: 2019-09-17 | End: 2019-09-17

## 2019-09-17 RX ORDER — IBUPROFEN 200 MG
16 TABLET ORAL
Status: DISCONTINUED | OUTPATIENT
Start: 2019-09-17 | End: 2019-09-18 | Stop reason: HOSPADM

## 2019-09-17 RX ORDER — ONDANSETRON 2 MG/ML
4 INJECTION INTRAMUSCULAR; INTRAVENOUS EVERY 6 HOURS PRN
Status: DISCONTINUED | OUTPATIENT
Start: 2019-09-17 | End: 2019-09-18 | Stop reason: HOSPADM

## 2019-09-17 RX ORDER — ASPIRIN 325 MG
325 TABLET, DELAYED RELEASE (ENTERIC COATED) ORAL DAILY
Status: DISCONTINUED | OUTPATIENT
Start: 2019-09-18 | End: 2019-09-18 | Stop reason: HOSPADM

## 2019-09-17 RX ORDER — TOPIRAMATE 100 MG/1
100 TABLET, FILM COATED ORAL 2 TIMES DAILY
Status: DISCONTINUED | OUTPATIENT
Start: 2019-09-17 | End: 2019-09-18 | Stop reason: HOSPADM

## 2019-09-17 RX ORDER — BUTALBITAL, ACETAMINOPHEN AND CAFFEINE 50; 325; 40 MG/1; MG/1; MG/1
2 TABLET ORAL
Status: COMPLETED | OUTPATIENT
Start: 2019-09-17 | End: 2019-09-17

## 2019-09-17 RX ORDER — CLONIDINE HYDROCHLORIDE 0.1 MG/1
0.1 TABLET ORAL EVERY 6 HOURS PRN
Status: DISCONTINUED | OUTPATIENT
Start: 2019-09-17 | End: 2019-09-18 | Stop reason: HOSPADM

## 2019-09-17 RX ORDER — GLUCAGON 1 MG
1 KIT INJECTION
Status: DISCONTINUED | OUTPATIENT
Start: 2019-09-17 | End: 2019-09-18 | Stop reason: HOSPADM

## 2019-09-17 RX ORDER — SODIUM CHLORIDE 0.9 % (FLUSH) 0.9 %
10 SYRINGE (ML) INJECTION
Status: DISCONTINUED | OUTPATIENT
Start: 2019-09-17 | End: 2019-09-18 | Stop reason: HOSPADM

## 2019-09-17 RX ORDER — DIPHENHYDRAMINE HYDROCHLORIDE 50 MG/ML
25 INJECTION INTRAMUSCULAR; INTRAVENOUS
Status: COMPLETED | OUTPATIENT
Start: 2019-09-17 | End: 2019-09-17

## 2019-09-17 RX ORDER — SERTRALINE HYDROCHLORIDE 50 MG/1
100 TABLET, FILM COATED ORAL DAILY
Status: DISCONTINUED | OUTPATIENT
Start: 2019-09-18 | End: 2019-09-18 | Stop reason: HOSPADM

## 2019-09-17 RX ORDER — METOCLOPRAMIDE HYDROCHLORIDE 5 MG/ML
10 INJECTION INTRAMUSCULAR; INTRAVENOUS
Status: COMPLETED | OUTPATIENT
Start: 2019-09-17 | End: 2019-09-17

## 2019-09-17 RX ORDER — ACETAMINOPHEN 500 MG
500 TABLET ORAL EVERY 6 HOURS PRN
Status: DISCONTINUED | OUTPATIENT
Start: 2019-09-17 | End: 2019-09-18 | Stop reason: HOSPADM

## 2019-09-17 RX ADMIN — ACETAMINOPHEN 500 MG: 500 TABLET ORAL at 09:09

## 2019-09-17 RX ADMIN — DIPHENHYDRAMINE HYDROCHLORIDE 25 MG: 50 INJECTION INTRAMUSCULAR; INTRAVENOUS at 06:09

## 2019-09-17 RX ADMIN — METOCLOPRAMIDE 10 MG: 5 INJECTION, SOLUTION INTRAMUSCULAR; INTRAVENOUS at 06:09

## 2019-09-17 RX ADMIN — BUTALBITAL, ACETAMINOPHEN AND CAFFEINE 2 TABLET: 50; 325; 40 TABLET ORAL at 08:09

## 2019-09-17 RX ADMIN — SODIUM CHLORIDE 1000 ML: 0.9 INJECTION, SOLUTION INTRAVENOUS at 06:09

## 2019-09-17 RX ADMIN — TOPIRAMATE 100 MG: 100 TABLET, FILM COATED ORAL at 09:09

## 2019-09-17 RX ADMIN — KETOROLAC TROMETHAMINE 30 MG: 30 INJECTION, SOLUTION INTRAMUSCULAR; INTRAVENOUS at 08:09

## 2019-09-17 NOTE — ED TRIAGE NOTES
Right ear pain for approx 1 month. Pt is reporting slurred speech that started this morning. Denies any unilateral weakness, facial droop. Denies chest pain, sob. Pt is c/o a headache, but says it feels like a normal migraine

## 2019-09-17 NOTE — TELEPHONE ENCOUNTER
I spoke to the pt and advised ER visit. Pt spoke to RN lead and was advised ER visit. Pt made follow up visit for 9/20/2019. Pt voices understanding.

## 2019-09-17 NOTE — ED PROVIDER NOTES
Encounter Date: 2019    SCRIBE #1 NOTE: I, Major Dee, am scribing for, and in the presence of,  Xavi Hubbard MD. I have scribed the following portions of the note - Other sections scribed: HPI, ROS, PE.       History     Chief Complaint   Patient presents with    Otalgia     pt reports right ear has been poudning for two months. today states she felt twitching in the right side of the face and also felt like her speech was slurred while at work.     CC: Muscular Fasciculation    HPI: 47 y/o female, with a PMHx of psychiatric hospitalization, migraines, and hypothyroidism, presents to the ED c/o muscular fasciculations. Pt reports that she has been experiencing muscular fasciculations since July. Pt went to her PCP (Shanta) and was referred to [place] to have a MRI and ENG test done, but never followed up to get the ENG done after MRI results returned. Today, pt reports that she was experiencing slurred speech and difficulty talking due to muscular fasciculations to the right side of her mouth. Pt notes that she was also experiencing an associated migraine. Pt has a FHx of strokes.     The history is provided by the patient. No  was used.     Review of patient's allergies indicates:  No Known Allergies  Past Medical History:   Diagnosis Date    History of psychiatric care     History of psychiatric hospitalization     Hypothyroidism     Migraines     Psychiatric exam requested by [facility]     Psychiatric problem     Therapy      Past Surgical History:   Procedure Laterality Date    APPENDECTOMY       SECTION       Family History   Problem Relation Age of Onset    Hypertension Mother     Hypertension Father     Stroke Father      Social History     Tobacco Use    Smoking status: Never Smoker    Smokeless tobacco: Never Used   Substance Use Topics    Alcohol use: No    Drug use: No     Review of Systems   Constitutional: Negative for fever.   HENT:  Negative for sore throat.    Eyes: Negative for visual disturbance.   Respiratory: Negative for shortness of breath.    Cardiovascular: Negative for chest pain.   Gastrointestinal: Negative for abdominal pain.   Genitourinary: Negative for dysuria.   Musculoskeletal: Negative for back pain.   Skin: Negative for rash.   Neurological: Positive for speech difficulty and headaches.        (+) muscular fasciculations       Physical Exam     Initial Vitals [09/17/19 1655]   BP Pulse Resp Temp SpO2   (!) 140/78 91 18 98.5 °F (36.9 °C) 100 %      MAP       --         Physical Exam    Nursing note and vitals reviewed.  Constitutional: She appears well-developed and well-nourished.   HENT:   Head: Normocephalic and atraumatic.   Eyes: EOM are normal. Pupils are equal, round, and reactive to light.   Neck: Normal range of motion. Neck supple. No thyromegaly present. No JVD present.   Cardiovascular: Normal rate and regular rhythm. Exam reveals no gallop and no friction rub.    No murmur heard.  Pulmonary/Chest: Breath sounds normal. No respiratory distress.   Abdominal: Soft. There is no tenderness.   Musculoskeletal: Normal range of motion. She exhibits no edema or tenderness.   Neurological: She is alert and oriented to person, place, and time. She has normal strength and normal reflexes. She displays normal reflexes. No cranial nerve deficit or sensory deficit. GCS score is 15. GCS eye subscore is 4. GCS verbal subscore is 5. GCS motor subscore is 6.   Skin: Skin is warm and dry. Capillary refill takes less than 2 seconds.         ED Course   Procedures  Labs Reviewed   CBC W/ AUTO DIFFERENTIAL - Abnormal; Notable for the following components:       Result Value    Hemoglobin 9.5 (*)     Hematocrit 31.9 (*)     Mean Corpuscular Volume 77 (*)     Mean Corpuscular Hemoglobin 22.9 (*)     Mean Corpuscular Hemoglobin Conc 29.8 (*)     RDW 19.3 (*)     All other components within normal limits   COMPREHENSIVE METABOLIC PANEL -  Abnormal; Notable for the following components:    Glucose 68 (*)     All other components within normal limits   TSH - Abnormal; Notable for the following components:    TSH 12.744 (*)     All other components within normal limits   URINALYSIS, REFLEX TO URINE CULTURE - Abnormal; Notable for the following components:    Appearance, UA Hazy (*)     Leukocytes, UA Trace (*)     All other components within normal limits    Narrative:     Preferred Collection Type->Urine, Clean Catch   URINALYSIS MICROSCOPIC - Abnormal; Notable for the following components:    Bacteria Few (*)     All other components within normal limits    Narrative:     Preferred Collection Type->Urine, Clean Catch   MAGNESIUM   T4, FREE   HEMOGLOBIN A1C   LIPID PANEL   URINALYSIS   POCT GLUCOSE   POCT GLUCOSE MONITORING CONTINUOUS     EKG Readings: (Independently Interpreted)   Initial Reading: No STEMI. Rhythm: Normal Sinus Rhythm. Heart Rate: 70. Ectopy: No Ectopy. Conduction: Normal. ST Segments: Normal ST Segments. T Waves: Normal.       Imaging Results          CT Head Without Contrast (Final result)  Result time 09/17/19 18:20:30    Final result by Parul Banegas MD (09/17/19 18:20:30)                 Impression:      No acute intracranial abnormality.      Electronically signed by: Parul Bnaegas  Date:    09/17/2019  Time:    18:20             Narrative:    EXAMINATION:  CT OF THE HEAD WITHOUT    CLINICAL HISTORY:  Focal neuro deficit, new, fixed or worsening, >6 hours;    TECHNIQUE:  5 mm unenhanced axial images were obtained from the skull base to the vertex.    COMPARISON:  None.    FINDINGS:  The ventricles, basal cisterns, and cortical sulci are within normal limits for patient's stated age.  There is an age-indeterminate lacune in the right caudate head (series 2 axial image 13).  There is no acute intracranial hemorrhage, territorial infarct or mass effect, or midline shift. The visualized paranasal sinuses and mastoid air  cells are clear.                              Patient reports dysarthria and difficulty speaking that started this morning.  Head CT shows age-indeterminate right caudate head lacunar infarct.  The patient has no risk factors or family history with her father having 3 prior strokes and the patient being on Imitrex for migraines if patient had 1 prior stroke may be having another stroke.  Will place in observation for further evaluation by Neurology for TIA as I do not detect any deficits at this time although patient states she feels like she is still having some difficulty speaking                          Clinical Impression:       ICD-10-CM ICD-9-CM   1. Dysarthria R47.1 784.51   2. TIA (transient ischemic attack) G45.9 435.9   3. Other migraine without status migrainosus, not intractable G43.809 346.80            I, Xavi Hubbard, personally performed the services described in this documentation. All medical record entries made by the scribe were at my direction and in my presence.  I have reviewed the chart and agree that the record reflects my personal performance and is accurate and complete.                    Xavi Hubbard MD  09/17/19 2087

## 2019-09-17 NOTE — TELEPHONE ENCOUNTER
"  Reason for Disposition   Difficult to awaken or acting confused (e.g., disoriented, slurred speech)    Protocols used: NEUROLOGIC DEFICIT-A-OH    Pt c/o "twitching in face" and c/o slurred speech. 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 Pt advised per protocol to call 911 and pt verbalizes understanding. Pt states she is going to drive herself to an ED.   "

## 2019-09-18 VITALS
WEIGHT: 168.19 LBS | DIASTOLIC BLOOD PRESSURE: 59 MMHG | TEMPERATURE: 98 F | OXYGEN SATURATION: 100 % | BODY MASS INDEX: 29.8 KG/M2 | SYSTOLIC BLOOD PRESSURE: 115 MMHG | HEIGHT: 63 IN | RESPIRATION RATE: 18 BRPM | HEART RATE: 55 BPM

## 2019-09-18 LAB
ALBUMIN SERPL BCP-MCNC: 3.1 G/DL (ref 3.5–5.2)
ALP SERPL-CCNC: 72 U/L (ref 55–135)
ALT SERPL W/O P-5'-P-CCNC: 20 U/L (ref 10–44)
ANION GAP SERPL CALC-SCNC: 5 MMOL/L (ref 8–16)
AORTIC ROOT ANNULUS: 2.79 CM
AORTIC VALVE CUSP SEPERATION: 2.13 CM
APTT BLDCRRT: 27.8 SEC (ref 21–32)
ASCENDING AORTA: 2.55 CM
AST SERPL-CCNC: 26 U/L (ref 10–40)
AV INDEX (PROSTH): 0.86
AV MEAN GRADIENT: 5 MMHG
AV PEAK GRADIENT: 9 MMHG
AV VALVE AREA: 3.28 CM2
AV VELOCITY RATIO: 0.8
BASOPHILS # BLD AUTO: 0.04 K/UL (ref 0–0.2)
BASOPHILS NFR BLD: 0.5 % (ref 0–1.9)
BILIRUB SERPL-MCNC: 0.3 MG/DL (ref 0.1–1)
BSA FOR ECHO PROCEDURE: 1.73 M2
BUN SERPL-MCNC: 12 MG/DL (ref 6–20)
CALCIUM SERPL-MCNC: 8.1 MG/DL (ref 8.7–10.5)
CHLORIDE SERPL-SCNC: 111 MMOL/L (ref 95–110)
CHOLEST SERPL-MCNC: 173 MG/DL (ref 120–199)
CHOLEST/HDLC SERPL: 2.9 {RATIO} (ref 2–5)
CK MB SERPL-MCNC: 1.2 NG/ML (ref 0.1–6.5)
CK MB SERPL-RTO: 0.8 % (ref 0–5)
CK SERPL-CCNC: 142 U/L (ref 20–180)
CO2 SERPL-SCNC: 23 MMOL/L (ref 23–29)
CREAT SERPL-MCNC: 0.9 MG/DL (ref 0.5–1.4)
CV ECHO LV RWT: 0.42 CM
DIFFERENTIAL METHOD: ABNORMAL
DOP CALC AO PEAK VEL: 1.49 M/S
DOP CALC AO VTI: 29.9 CM
DOP CALC LVOT AREA: 3.8 CM2
DOP CALC LVOT DIAMETER: 2.21 CM
DOP CALC LVOT PEAK VEL: 1.19 M/S
DOP CALC LVOT STROKE VOLUME: 98.15 CM3
DOP CALCLVOT PEAK VEL VTI: 25.6 CM
E WAVE DECELERATION TIME: 182.94 MSEC
E/A RATIO: 1.63
E/E' RATIO: 7.68 M/S
ECHO LV POSTERIOR WALL: 0.89 CM (ref 0.6–1.1)
EOSINOPHIL # BLD AUTO: 0.1 K/UL (ref 0–0.5)
EOSINOPHIL NFR BLD: 1.1 % (ref 0–8)
ERYTHROCYTE [DISTWIDTH] IN BLOOD BY AUTOMATED COUNT: 19 % (ref 11.5–14.5)
EST. GFR  (AFRICAN AMERICAN): >60 ML/MIN/1.73 M^2
EST. GFR  (NON AFRICAN AMERICAN): >60 ML/MIN/1.73 M^2
ESTIMATED AVG GLUCOSE: 97 MG/DL (ref 68–131)
FRACTIONAL SHORTENING: 41 % (ref 28–44)
GLUCOSE SERPL-MCNC: 90 MG/DL (ref 70–110)
HBA1C MFR BLD HPLC: 5 % (ref 4–5.6)
HCT VFR BLD AUTO: 32.4 % (ref 37–48.5)
HDLC SERPL-MCNC: 60 MG/DL (ref 40–75)
HDLC SERPL: 34.7 % (ref 20–50)
HGB BLD-MCNC: 9.3 G/DL (ref 12–16)
INR PPP: 1 (ref 0.8–1.2)
INTERVENTRICULAR SEPTUM: 0.92 CM (ref 0.6–1.1)
IVRT: 0.06 MSEC
LA MAJOR: 4.68 CM
LA MINOR: 4.68 CM
LA WIDTH: 4.64 CM
LDLC SERPL CALC-MCNC: 78.2 MG/DL (ref 63–159)
LEFT ATRIUM SIZE: 3.52 CM
LEFT ATRIUM VOLUME INDEX: 36.2 ML/M2
LEFT ATRIUM VOLUME: 64.97 CM3
LEFT INTERNAL DIMENSION IN SYSTOLE: 2.49 CM (ref 2.1–4)
LEFT VENTRICLE DIASTOLIC VOLUME INDEX: 44.33 ML/M2
LEFT VENTRICLE DIASTOLIC VOLUME: 79.64 ML
LEFT VENTRICLE MASS INDEX: 67 G/M2
LEFT VENTRICLE SYSTOLIC VOLUME INDEX: 12.2 ML/M2
LEFT VENTRICLE SYSTOLIC VOLUME: 21.99 ML
LEFT VENTRICULAR INTERNAL DIMENSION IN DIASTOLE: 4.22 CM (ref 3.5–6)
LEFT VENTRICULAR MASS: 120.5 G
LV LATERAL E/E' RATIO: 6.4 M/S
LV SEPTAL E/E' RATIO: 9.6 M/S
LYMPHOCYTES # BLD AUTO: 2.6 K/UL (ref 1–4.8)
LYMPHOCYTES NFR BLD: 31.9 % (ref 18–48)
MAGNESIUM SERPL-MCNC: 2 MG/DL (ref 1.6–2.6)
MCH RBC QN AUTO: 22.5 PG (ref 27–31)
MCHC RBC AUTO-ENTMCNC: 28.7 G/DL (ref 32–36)
MCV RBC AUTO: 79 FL (ref 82–98)
MONOCYTES # BLD AUTO: 0.6 K/UL (ref 0.3–1)
MONOCYTES NFR BLD: 7.4 % (ref 4–15)
MV PEAK A VEL: 0.59 M/S
MV PEAK E VEL: 0.96 M/S
NEUTROPHILS # BLD AUTO: 4.7 K/UL (ref 1.8–7.7)
NEUTROPHILS NFR BLD: 59.1 % (ref 38–73)
NONHDLC SERPL-MCNC: 113 MG/DL
PHOSPHATE SERPL-MCNC: 3.3 MG/DL (ref 2.7–4.5)
PISA TR MAX VEL: 2.1 M/S
PLATELET # BLD AUTO: 290 K/UL (ref 150–350)
PMV BLD AUTO: 11.1 FL (ref 9.2–12.9)
POCT GLUCOSE: 81 MG/DL (ref 70–110)
POCT GLUCOSE: 83 MG/DL (ref 70–110)
POTASSIUM SERPL-SCNC: 3.9 MMOL/L (ref 3.5–5.1)
PROT SERPL-MCNC: 6.9 G/DL (ref 6–8.4)
PROTHROMBIN TIME: 10 SEC (ref 9–12.5)
PULM VEIN S/D RATIO: 1.14
PV PEAK D VEL: 0.56 M/S
PV PEAK S VEL: 0.64 M/S
PV PEAK VELOCITY: 0.86 CM/S
RA MAJOR: 4.41 CM
RA PRESSURE: 8 MMHG
RA WIDTH: 3.43 CM
RBC # BLD AUTO: 4.13 M/UL (ref 4–5.4)
RIGHT VENTRICULAR END-DIASTOLIC DIMENSION: 2.83 CM
RV TISSUE DOPPLER FREE WALL SYSTOLIC VELOCITY 1 (APICAL 4 CHAMBER VIEW): 12.75 CM/S
SINUS: 2.86 CM
SODIUM SERPL-SCNC: 139 MMOL/L (ref 136–145)
STJ: 2.37 CM
TDI LATERAL: 0.15 M/S
TDI SEPTAL: 0.1 M/S
TDI: 0.13 M/S
TR MAX PG: 18 MMHG
TRIGL SERPL-MCNC: 174 MG/DL (ref 30–150)
TROPONIN I SERPL DL<=0.01 NG/ML-MCNC: <0.006 NG/ML (ref 0–0.03)
TV REST PULMONARY ARTERY PRESSURE: 26 MMHG
WBC # BLD AUTO: 8 K/UL (ref 3.9–12.7)

## 2019-09-18 PROCEDURE — 25000003 PHARM REV CODE 250: Performed by: HOSPITALIST

## 2019-09-18 PROCEDURE — 80061 LIPID PANEL: CPT

## 2019-09-18 PROCEDURE — 25000003 PHARM REV CODE 250: Performed by: NURSE PRACTITIONER

## 2019-09-18 PROCEDURE — G9158 MOTOR SPEECH D/C STATUS: HCPCS | Mod: CI

## 2019-09-18 PROCEDURE — 85610 PROTHROMBIN TIME: CPT

## 2019-09-18 PROCEDURE — 82553 CREATINE MB FRACTION: CPT

## 2019-09-18 PROCEDURE — 85025 COMPLETE CBC W/AUTO DIFF WBC: CPT

## 2019-09-18 PROCEDURE — G0378 HOSPITAL OBSERVATION PER HR: HCPCS

## 2019-09-18 PROCEDURE — 83036 HEMOGLOBIN GLYCOSYLATED A1C: CPT

## 2019-09-18 PROCEDURE — 80053 COMPREHEN METABOLIC PANEL: CPT

## 2019-09-18 PROCEDURE — G8999 MOTOR SPEECH CURRENT STATUS: HCPCS | Mod: CI

## 2019-09-18 PROCEDURE — 83735 ASSAY OF MAGNESIUM: CPT

## 2019-09-18 PROCEDURE — 99204 PR OFFICE/OUTPT VISIT, NEW, LEVL IV, 45-59 MIN: ICD-10-PCS | Mod: ,,, | Performed by: PSYCHIATRY & NEUROLOGY

## 2019-09-18 PROCEDURE — 94761 N-INVAS EAR/PLS OXIMETRY MLT: CPT

## 2019-09-18 PROCEDURE — 25500020 PHARM REV CODE 255: Performed by: INTERNAL MEDICINE

## 2019-09-18 PROCEDURE — 92523 SPEECH SOUND LANG COMPREHEN: CPT

## 2019-09-18 PROCEDURE — 85730 THROMBOPLASTIN TIME PARTIAL: CPT

## 2019-09-18 PROCEDURE — 84484 ASSAY OF TROPONIN QUANT: CPT

## 2019-09-18 PROCEDURE — 82550 ASSAY OF CK (CPK): CPT

## 2019-09-18 PROCEDURE — 92610 EVALUATE SWALLOWING FUNCTION: CPT

## 2019-09-18 PROCEDURE — 25000003 PHARM REV CODE 250: Performed by: EMERGENCY MEDICINE

## 2019-09-18 PROCEDURE — 84100 ASSAY OF PHOSPHORUS: CPT

## 2019-09-18 PROCEDURE — 99204 OFFICE O/P NEW MOD 45 MIN: CPT | Mod: ,,, | Performed by: PSYCHIATRY & NEUROLOGY

## 2019-09-18 PROCEDURE — A9585 GADOBUTROL INJECTION: HCPCS | Performed by: INTERNAL MEDICINE

## 2019-09-18 PROCEDURE — 36415 COLL VENOUS BLD VENIPUNCTURE: CPT

## 2019-09-18 RX ORDER — TOPIRAMATE SPINKLE 15 MG/1
30 CAPSULE ORAL DAILY
Qty: 1 CAPSULE | Refills: 0
Start: 2019-09-18 | End: 2019-09-20 | Stop reason: DRUGHIGH

## 2019-09-18 RX ORDER — SUMATRIPTAN SUCCINATE 25 MG/1
50 TABLET ORAL
Status: DISCONTINUED | OUTPATIENT
Start: 2019-09-18 | End: 2019-09-18 | Stop reason: HOSPADM

## 2019-09-18 RX ORDER — GADOBUTROL 604.72 MG/ML
7.5 INJECTION INTRAVENOUS
Status: COMPLETED | OUTPATIENT
Start: 2019-09-18 | End: 2019-09-18

## 2019-09-18 RX ORDER — BUTALBITAL, ACETAMINOPHEN AND CAFFEINE 50; 325; 40 MG/1; MG/1; MG/1
1 TABLET ORAL EVERY 4 HOURS PRN
Status: COMPLETED | OUTPATIENT
Start: 2019-09-18 | End: 2019-09-18

## 2019-09-18 RX ORDER — LEVOTHYROXINE SODIUM 150 UG/1
150 TABLET ORAL
Status: DISCONTINUED | OUTPATIENT
Start: 2019-09-18 | End: 2019-09-18 | Stop reason: HOSPADM

## 2019-09-18 RX ADMIN — TOPIRAMATE 100 MG: 100 TABLET, FILM COATED ORAL at 08:09

## 2019-09-18 RX ADMIN — GADOBUTROL 7.5 ML: 604.72 INJECTION INTRAVENOUS at 08:09

## 2019-09-18 RX ADMIN — ASPIRIN 325 MG: 325 TABLET, DELAYED RELEASE ORAL at 08:09

## 2019-09-18 RX ADMIN — BUTALBITAL, ACETAMINOPHEN AND CAFFEINE 1 TABLET: 50; 325; 40 TABLET ORAL at 01:09

## 2019-09-18 RX ADMIN — BUTALBITAL, ACETAMINOPHEN AND CAFFEINE 1 TABLET: 50; 325; 40 TABLET ORAL at 08:09

## 2019-09-18 RX ADMIN — SERTRALINE HYDROCHLORIDE 100 MG: 50 TABLET ORAL at 08:09

## 2019-09-18 RX ADMIN — LEVOTHYROXINE SODIUM 150 MCG: 150 TABLET ORAL at 08:09

## 2019-09-18 NOTE — PLAN OF CARE
09/18/19 1136   Discharge Assessment   Assessment Type Discharge Planning Assessment   Assessment information obtained from? Medical Record   Prior to hospitilization cognitive status: Alert/Oriented   Prior to hospitalization functional status: Independent   Current cognitive status: Alert/Oriented   Current Functional Status: Independent   Facility Arrived From: home   Lives With other (see comments)   Able to Return to Prior Arrangements yes   Is patient able to care for self after discharge? Yes   Who are your caregiver(s) and their phone number(s)? Joanie- [phone]   Patient's perception of discharge disposition home or selfcare   Readmission Within the Last 30 Days no previous admission in last 30 days   Patient currently being followed by outpatient case management? No   Patient currently receives any other outside agency services? No   Equipment Currently Used at Home none   Do you have any problems affording any of your prescribed medications? No   Is the patient taking medications as prescribed? yes   Does the patient have transportation home? Yes   Transportation Anticipated family or friend will provide   Does the patient receive services at the [facility]mad[facility] Clinic? No   Discharge Plan A Home  (with follow up appointment)   DME Needed Upon Discharge  none   Patient/Family in Agreement with Plan yes     [id number] - [place] - [address]  [address] [address]  Phone: [phone] Fax: [phone]

## 2019-09-18 NOTE — DISCHARGE SUMMARY
"[facility] Hospital Medicine  Discharge Summary      Patient Name: Magalis Rosales  MRN: [id number]  Admission Date: 9/17/2019  Hospital Length of Stay: 0 days  Discharge Date and Time:  09/18/2019 1:51 PM  Attending Physician: Jesusita att. providers found   Discharging Provider: Hina Malloy NP  Primary Care Provider: Brock Womack MD      HPI:   Magalis Rosales 48 y.o. female with hypothyroidism, migraines presents to the hospital with a chief complaint of speech difficulty. She reports today she began to experience difficulty speaking and felt as though she was slurring her speech. It has been constant all day. She does not remember seeing any facial droop. She has also been experiencing left sided "facial twitching" that she has been followed with neurology at . She also experienced a left sided headache described as a migraine without radiation that was relieved with Fioricet in the ED. She denies any numbness or weakness in an extremity. She was recently seen at  and had an MRI with neurology. She reports they then recommended further nerve testing, but the patient never followed up. She denies fever, chest pain, LOC, head trauma, dizziness, numbness, visual disturbance.     In the ED, TSH of 12 with T4 0.87, CT without acute abnormality, electrolytes within normal limits.     * No surgery found *      Hospital Course:   Mr. Rosales is a 47 yo female who was admitted to observation for right ear pounding pain, dysarthria, and right eye/facial twitching. Stroke ruled out. MRA brain neck no occlusion or stenosis. 2 D echo pending. Neurology felt dysarthria possible from migraine and right facial facial twitching appeared to be hemifacial spasm. SLP did not observe difficulty getting words out and right facial twitching during time of evaluation and recommended no follow up indicated at this time.  Continue home topiramate and close follow up with primary neurologist at [facility]. Referred to " ENT at [facility] for ongoing ear pain x 1 week. Patient is hemodynamically stable for discharge home with close follow up Neurology and ENT.      Consults:   Consults (From admission, onward)        Status Ordering Provider     Inpatient consult to Neurology  Once     Provider:  Jony Manrique MD    Completed ABE ASKEW     Inpatient consult to Registered Dietitian/Nutritionist  Once     Provider:  (Not yet assigned)    Acknowledged TIM KING consult to case management/social work  Once     Provider:  (Not yet assigned)    Acknowledged ABE ASKEW          No new Assessment & Plan notes have been filed under this hospital service since the last note was generated.  Service: Hospital Medicine    Final Active Diagnoses:    Diagnosis Date Noted POA    PRINCIPAL PROBLEM:  Dysarthria [R47.1] 09/17/2019 Unknown    Hypothyroid [E03.9] 09/17/2019 Unknown    Migraines [G43.909] 09/17/2019 Unknown      Problems Resolved During this Admission:       Discharged Condition: good    Disposition: Home or Self Care    Follow Up:  Follow-up Information     Brcok Womack MD On 9/20/2019.    Specialties:  Internal Medicine, Wound Care  Why:  Appointment scheduled for Friday September 20th at 4:20pm  Contact information:  [address]  [facility] [address]  [phone]             Kalpesh Solo MD. Schedule an appointment as soon as possible for a visit in 1 day.    Specialty:  Otolaryngology  Why:  right ear pain  Contact information:  [address]  [facility] [address]  [phone]                 Patient Instructions:      Diet Adult Regular     Diet Cardiac     Notify your health care provider if you experience any of the following:  temperature >100.4     Notify your health care provider if you experience any of the following:  difficulty breathing or increased cough     Notify your health care provider if you experience any of the following:  increased confusion or weakness     Activity  as tolerated       Significant Diagnostic Studies: Labs:   BMP:   Recent Labs   Lab 09/17/19  1806 09/18/19  0340   GLU 68* 90    139   K 3.7 3.9    111*   CO2 23 23   BUN 14 12   CREATININE 1.0 0.9   CALCIUM 8.7 8.1*   MG 2.0 2.0   , CMP   Recent Labs   Lab 09/17/19  1806 09/18/19  0340    139   K 3.7 3.9    111*   CO2 23 23   GLU 68* 90   BUN 14 12   CREATININE 1.0 0.9   CALCIUM 8.7 8.1*   PROT 7.8 6.9   ALBUMIN 3.6 3.1*   BILITOT 0.2 0.3   ALKPHOS 78 72   AST 39 26   ALT 23 20   ANIONGAP 8 5*   ESTGFRAFRICA >60 >60   EGFRNONAA >60 >60   , CBC   Recent Labs   Lab 09/17/19  1806 09/18/19  0340   WBC 8.72 8.00   HGB 9.5* 9.3*   HCT 31.9* 32.4*    290   , INR   Lab Results   Component Value Date    INR 1.0 09/18/2019   , Lipid Panel   Lab Results   Component Value Date    CHOL 173 09/18/2019    HDL 60 09/18/2019    LDLCALC 78.2 09/18/2019    TRIG 174 (H) 09/18/2019    CHOLHDL 34.7 09/18/2019   , Troponin   Recent Labs   Lab 09/18/19  0340   TROPONINI <0.006   , A1C:   Recent Labs   Lab 09/18/19  0340   HGBA1C 5.0    and All labs within the past 24 hours have been reviewed    Pending Diagnostic Studies:     Procedure Component Value Units Date/Time    Echo Color Flow Doppler? Yes [[id number]] Resulted:  09/18/19 1214    Order Status:  Sent Lab Status:  In process Updated:  09/18/19 1214     BSA 1.73 m2      TDI SEPTAL 0.10 m/s      LV LATERAL E/E' RATIO 6.40 m/s      LV SEPTAL E/E' RATIO 9.60 m/s      LA WIDTH 4.64 cm      AORTIC VALVE CUSP SEPERATION 2.13 cm      TDI LATERAL 0.15 m/s      PV PEAK VELOCITY 0.86 cm/s      LVIDD 4.22 cm      IVS 0.92 cm      PW 0.89 cm      Ao root annulus 2.79 cm      LVIDS 2.49 cm      FS 41 %      LA volume 64.97 cm3      Sinus 2.86 cm      STJ 2.37 cm      Ascending aorta 2.55 cm      LV mass 120.50 g      LA size 3.52 cm      RVDD 2.83 cm      RV S' 12.75 cm/s      Left Ventricle Relative Wall Thickness 0.42 cm      AV mean gradient 5 mmHg      AV  valve area 3.28 cm2      AV Velocity Ratio 0.80     AV index (prosthetic) 0.86     E/A ratio 1.63     Mean e' 0.13 m/s      E wave decelartion time 182.94 msec      IVRT 0.06 msec      Pulm vein S/D ratio 1.14     LVOT diameter 2.21 cm      LVOT area 3.8 cm2      LVOT peak domingo 1.19 m/s      LVOT peak VTI 25.60 cm      Ao peak domingo 1.49 m/s      Ao VTI 29.90 cm      LVOT stroke volume 98.15 cm3      AV peak gradient 9 mmHg      E/E' ratio 7.68 m/s      MV Peak E Domingo 0.96 m/s      TR Max Domingo 2.10 m/s      MV Peak A Domingo 0.59 m/s      PV Peak S Domingo 0.64 m/s      PV Peak D Domingo 0.56 m/s      LV Systolic Volume 21.99 mL      LV Systolic Volume Index 12.2 mL/m2      LV Diastolic Volume 79.64 mL      LV Diastolic Volume Index 44.33 mL/m2      LA Volume Index 36.2 mL/m2      LV Mass Index 67 g/m2      RA Major Axis 4.41 cm      Left Atrium Minor Axis 4.68 cm      Left Atrium Major Axis 4.68 cm      Triscuspid Valve Regurgitation Peak Gradient 18 mmHg      RA Width 3.43 cm     Urinalysis - clean catch [[id number]] Collected:  09/18/19 0347    Order Status:  Sent Lab Status:  In process Updated:  09/18/19 0347    Specimen:  Urine, Clean Catch          Medications:  Reconciled Home Medications:      Medication List      START taking these medications    topiramate 15 MG capsule  Commonly known as:  TOPAMAX  Take 2 capsules (30 mg total) by mouth once daily.  Replaces:  topiramate 100 MG tablet        CONTINUE taking these medications    levothyroxine 175 MCG tablet  Commonly known as:  SYNTHROID, LEVOTHROID  Take 1 tablet (175 mcg total) by mouth once daily.     sertraline 100 MG tablet  Commonly known as:  ZOLOFT  Take 100 mg by mouth once daily. Take 1 and half tab by mouth every day     sumatriptan 50 MG tablet  Commonly known as:  IMITREX  TAKE 1 TABLET BY MOUTH AT ONSET OF HEADACHE, MAY REPEAT IN 2 HOURS. NOT MORE THAN 2 IN 24 HOURS        STOP taking these medications    topiramate 100 MG tablet  Commonly known as:   TOPAMAX  Replaced by:  topiramate 15 MG capsule            Indwelling Lines/Drains at time of discharge:   Lines/Drains/Airways          None          Time spent on the discharge of patient: 30 minutes  Patient was seen and examined on the date of discharge and determined to be suitable for discharge.         Hina Malloy NP  Department of Hospital Medicine  [facility]

## 2019-09-18 NOTE — NURSING
Patient off unit to scheduled testing. Cardiac monitoring d/c'd for testing. Patient with complaints of headache pain but doesn't want medication available. Patient to receive prn med per patient request once back on unit. Patient in NAD. Patient stable.

## 2019-09-18 NOTE — PROGRESS NOTES
What is Dysarthria?  Dysarthria is a speech problem caused by a weakness or a lack of control over muscles in the face, mouth, and respiratory system. Dysarthria may be congenital (present at birth) such as cerebral palsy or the result of a stroke or brain injury. A person who has dysarthria knows which words to use, but may not be able to make the right sounds.  Signs of dysarthria  The signs of dysarthria vary with each person. A person with dysarthria may show some or all of the signs listed below.  A person with dysarthria may not be able to do the following:  · Make certain sounds  · Speak whole sentences clearly  · Control his or her tone of voice, volume, or breaks between words  · Realize his or her speech is hard to understand  · Control his or her saliva  A person with dysarthria may do the following:  · Speak certain sounds louder than others  · Sound harsh or raspy during speech  · Pause for breath in the wrong places  · Drop or slur parts of words  · Speak slowly or in a way that sounds hesitant or halting

## 2019-09-18 NOTE — HPI
"Magalis Rosales 48 y.o. female with hypothyroidism, migraines presents to the hospital with a chief complaint of speech difficulty. She reports today she began to experience difficulty speaking and felt as though she was slurring her speech. It has been constant all day. She does not remember seeing any facial droop. She has also been experiencing left sided "facial twitching" that she has been followed with neurology at . She also experienced a left sided headache described as a migraine without radiation that was relieved with Fioricet in the ED. She denies any numbness or weakness in an extremity. She was recently seen at  and had an MRI with neurology. She reports they then recommended further nerve testing, but the patient never followed up. She denies fever, chest pain, LOC, head trauma, dizziness, numbness, visual disturbance.     In the ED, TSH of 12 with T4 0.87, CT without acute abnormality, electrolytes within normal limits.   "

## 2019-09-18 NOTE — CONSULTS
[facility]  Neurology  Consult Note    Patient Name: Magalis Rosales  MRN: [id number]  Admission Date: 2019  Hospital Length of Stay: 0 days  Code Status: Full Code   Attending Provider: Fabio Rivas MD   Consulting Provider: Jony Manrique MD  Primary Care Physician: Brock Womack MD  Principal Problem:Dysarthria    Inpatient consult to Neurology  Consult performed by: Jony Manrique MD  Consult ordered by: Eliseo Babb PA-C        Subjective:     Chief Complaint: Headache, Slurred speech     HPI: 49 y/o female with medical Hx as listed below comes ot ED due to sudden onset of slurred speech. Symptoms have not resolved, accompanied bu twitching of the right side of face (although this has been ongoing for two months now and has been seen at neurology clinic at [facility]). Pt reports a headache on left side that it is like her usual migraine. No visual disturbances, unilateral weakness/numbness, or vertigo.    Past Medical History:   Diagnosis Date    History of psychiatric care     History of psychiatric hospitalization     Hypothyroidism     Migraines     Psychiatric exam requested by authority     Psychiatric problem     Therapy        Past Surgical History:   Procedure Laterality Date    APPENDECTOMY       SECTION         Review of patient's allergies indicates:  No Known Allergies    Current Neurological Medications:     No current facility-administered medications on file prior to encounter.      Current Outpatient Medications on File Prior to Encounter   Medication Sig    levothyroxine (SYNTHROID, LEVOTHROID) 175 MCG tablet Take 1 tablet (175 mcg total) by mouth once daily.    sertraline (ZOLOFT) 100 MG tablet Take 100 mg by mouth once daily. Take 1 and half tab by mouth every day    sumatriptan (IMITREX) 50 MG tablet TAKE 1 TABLET BY MOUTH AT ONSET OF HEADACHE, MAY REPEAT IN 2 HOURS. NOT MORE THAN 2 IN 24 HOURS    topiramate (TOPAMAX) 100 MG tablet Take 100 mg  by mouth 2 (two) times daily.      Family History     Problem Relation (Age of Onset)    Hypertension Mother, Father    Stroke Father        Tobacco Use    Smoking status: Never Smoker    Smokeless tobacco: Never Used   Substance and Sexual Activity    Alcohol use: No    Drug use: No    Sexual activity: Not Currently     Partners: Male     Review of Systems   Constitutional: Negative for fever.   HENT: Negative for trouble swallowing.    Eyes: Negative for photophobia.   Respiratory: Negative for stridor.    Cardiovascular: Negative for chest pain.   Gastrointestinal: Negative for abdominal pain.   Genitourinary: Negative for dysuria.   Musculoskeletal: Negative for back pain.   Neurological: Positive for speech difficulty and headaches.     Objective:     Vital Signs (Most Recent):  Temp: 98 °F (36.7 °C) (09/18/19 0332)  Pulse: (!) 56 (09/18/19 0332)  Resp: 16 (09/18/19 0332)  BP: (!) 96/55 (09/18/19 0332)  SpO2: 100 % (09/18/19 0332) Vital Signs (24h Range):  Temp:  [97.8 °F (36.6 °C)-98.7 °F (37.1 °C)] 98 °F (36.7 °C)  Pulse:  [53-91] 56  Resp:  [16-18] 16  SpO2:  [100 %] 100 %  BP: ()/(55-80) 96/55     Weight: 76.3 kg (168 lb 3.4 oz)  Body mass index is 29.8 kg/m².    Physical Exam   Constitutional: She is oriented to person, place, and time. No distress.   HENT:   Head: Normocephalic.   Eyes: Right eye exhibits no discharge. Left eye exhibits no discharge.   Neck: Normal range of motion.   Cardiovascular: Normal rate.   Pulmonary/Chest: Effort normal.   Abdominal: Soft.   Musculoskeletal: She exhibits no edema.   Neurological: She is oriented to person, place, and time. She has normal strength. She has a normal Finger-Nose-Finger Test. Gait normal.   Reflex Scores:       Tricep reflexes are 2+ on the right side and 2+ on the left side.       Bicep reflexes are 2+ on the right side and 2+ on the left side.       Brachioradialis reflexes are 2+ on the right side and 2+ on the left side.       Patellar  reflexes are 2+ on the right side and 2+ on the left side.       Achilles reflexes are 2+ on the right side and 2+ on the left side.  Skin: She is not diaphoretic.   Psychiatric: Her speech is normal.       NEUROLOGICAL EXAMINATION:     MENTAL STATUS   Oriented to person, place, and time.   Speech: speech is normal   Level of consciousness: alert    CRANIAL NERVES     CN II   Right visual field deficit: none  Left visual field deficit: none     CN III, IV, VI   Right pupil: Size: 2 mm. Shape: regular. Consensual response: intact.   Left pupil: Size: 2 mm. Shape: regular. Consensual response: intact.   Nystagmus: none   Ophthalmoparesis: none  Conjugate gaze: present    CN V   Right facial sensation deficit: none  Left facial sensation deficit: none    CN VII   Right facial weakness: none  Left facial weakness: none    CN IX, X   Palate: symmetric    CN XI   Right trapezius strength: normal  Left trapezius strength: normal    CN XII   Tongue deviation: none    MOTOR EXAM     Strength   Strength 5/5 throughout.     REFLEXES     Reflexes   Right brachioradialis: 2+  Left brachioradialis: 2+  Right biceps: 2+  Left biceps: 2+  Right triceps: 2+  Left triceps: 2+  Right patellar: 2+  Left patellar: 2+  Right achilles: 2+  Left achilles: 2+    SENSORY EXAM   Right arm light touch: normal  Left arm light touch: normal  Right leg light touch: normal  Left leg light touch: normal    GAIT AND COORDINATION     Gait  Gait: normal     Coordination   Finger to nose coordination: normal    Tremor   Resting tremor: absent      Significant Labs:   CBC:   Recent Labs   Lab 09/17/19  1806 09/18/19  0340   WBC 8.72 8.00   HGB 9.5* 9.3*   HCT 31.9* 32.4*    290     CMP:   Recent Labs   Lab 09/17/19  1806 09/18/19  0340   GLU 68* 90    139   K 3.7 3.9    111*   CO2 23 23   BUN 14 12   CREATININE 1.0 0.9   CALCIUM 8.7 8.1*   MG 2.0 2.0   PROT 7.8 6.9   ALBUMIN 3.6 3.1*   BILITOT 0.2 0.3   ALKPHOS 78 72   AST 39 26   ALT  23 20   ANIONGAP 8 5*   EGFRNONAA >60 >60       Significant Imaging:   Head CT  FINDINGS:  The ventricles, basal cisterns, and cortical sulci are within normal limits for patient's stated age.  There is an age-indeterminate lacune in the right caudate head (series 2 axial image 13).  There is no acute intracranial hemorrhage, territorial infarct or mass effect, or midline shift. The visualized paranasal sinuses and mastoid air cells are clear.      Impression       No acute intracranial abnormality.      Electronically signed by: Parul Banegas  Date: 09/17/2019  Time: 18:20     MRI brain  Impression       1. No acute intracranial abnormalities.  2. Few scattered T2 hyperintensities in the white matter of the frontal lobes.  Although these could represent microvascular ischemic changes these could be related to migraine type syndromes (vasculitis).  Clinical correlation suggested.  3. Left maxillary antral retention cyst.      Electronically signed by: Ric Santos MD  Date: 09/18/2019  Time: 09:01         Assessment and Plan:     47 y/o female consulted for dysarthria    1. Dysarthria: uncertain the etiology as there seems to be no acute stroke or intracranial abnormality to explain her symptoms.   Perhaps migraine related?     2. Right facial twitching: seems to be a hemifacial spasm?   -May nee [place] in clininc. She has a neurologist at [facility] which pt should return to upon D/C home.    3. Headache: Hx of migraine.On topiramate 30 mg daily.   -Effective rescue medication is sumatriptan which was not given until stroke was being R/O.   -Neurology follow up.    Active Diagnoses:    Diagnosis Date Noted POA    PRINCIPAL PROBLEM:  Dysarthria [R47.1] 09/17/2019 Unknown    Hypothyroid [E03.9] 09/17/2019 Unknown    Migraines [G43.909] 09/17/2019 Unknown      Problems Resolved During this Admission:       VTE Risk Mitigation (From admission, onward)        Ordered     IP VTE LOW RISK PATIENT  Once      09/17/19 2057      Place sequential compression device  Until discontinued      09/17/19 2057          Thank you for your consult. I will follow-up with patient. Please contact us if you have any additional questions.    Jony Manrique MD  Neurology  [facility]

## 2019-09-18 NOTE — PROGRESS NOTES
Spoke with patient's nurse on floor, Estefania, who stated the MRI is to be done in the AM per the PA. Informed nurse that the MRI would be done about 615am. Patient's nurse is to have the PA put in the telemetry order to remove tele for the MRI. The patient's nurse in the ER states the patient has no contraindications for the MRI and can travel by wheel chair. MRI will be done in AM.

## 2019-09-18 NOTE — PROGRESS NOTES
WRITTEN HEALTHCARE DISCHARGE INFORMATION      Things that YOU are RESPONSIBLE for to Manage Your Care At Home:     1. Getting your prescriptions filled.  2. Taking you medications as directed. DO NOT MISS ANY DOSES!  3. Going to your follow-up doctor appointments. This is important because it allows the doctor to monitor your progress and to determine if any changes need to be made to your treatment plan.     If you are unable to make your follow up appointments, please call the number listed and reschedule this appointment.      ____________HELP AT HOME____________________     Experiencing any SIGNS or SYMPTOMS: YOU CAN     Schedule a same day appopintment with your Primary Care Doctor or  you can call [facility] On Call Nurse Care Line for 24/7 assistance at [phone]     If you are experience any signs or symptoms that have become severe, Call 911 and come to your nearest Emergency Room.     Thank you for choosing [facility] and allowing us to care for you.   From your care management team:      You should receive a call from [facility] within 48-72 hours to help manage your care after discharge. Please try to make sure that you answer your phone for this important phone call.    Follow-up Information     Brock Womack MD On 9/20/2019.    Specialties:  Internal Medicine, Wound Care  Why:  Appointment scheduled for Friday September 20th at 4:20pm  Contact information:  [address]  [place] [address]  [phone]

## 2019-09-18 NOTE — ASSESSMENT & PLAN NOTE
CT head without acute abnormality. No facial droop or slurred speech on exam, patient reports difficulty speaking words.   -MRI/MRA/Echo pending  -Neurology consulted  -continue aspirin  -SLP consulted  -Telemetry  -Nursing swallow assessment  -neuro checks, fall precautions, aspiration precautions  -A1c/Lipid pending

## 2019-09-18 NOTE — SUBJECTIVE & OBJECTIVE
Past Medical History:   Diagnosis Date    History of psychiatric care     History of psychiatric hospitalization     Hypothyroidism     Migraines     Psychiatric exam requested by authority     Psychiatric problem     Therapy        Past Surgical History:   Procedure Laterality Date    APPENDECTOMY       SECTION         Review of patient's allergies indicates:  No Known Allergies    No current facility-administered medications on file prior to encounter.      Current Outpatient Medications on File Prior to Encounter   Medication Sig    sertraline (ZOLOFT) 100 MG tablet Take 100 mg by mouth once daily. Take 1 and half tab by mouth every day    levothyroxine (SYNTHROID, LEVOTHROID) 175 MCG tablet Take 1 tablet (175 mcg total) by mouth once daily.    sumatriptan (IMITREX) 50 MG tablet TAKE 1 TABLET BY MOUTH AT ONSET OF HEADACHE, MAY REPEAT IN 2 HOURS. NOT MORE THAN 2 IN 24 HOURS    topiramate (TOPAMAX) 100 MG tablet Take 100 mg by mouth 2 (two) times daily.     Family History     Problem Relation (Age of Onset)    Hypertension Mother, Father    Stroke Father        Tobacco Use    Smoking status: Never Smoker    Smokeless tobacco: Never Used   Substance and Sexual Activity    Alcohol use: No    Drug use: No    Sexual activity: Not Currently     Partners: Male     Review of Systems   Constitutional: Negative for chills and fever.   HENT: Negative for nosebleeds and tinnitus.    Eyes: Negative for photophobia and visual disturbance.   Respiratory: Negative for shortness of breath and wheezing.    Cardiovascular: Negative for chest pain, palpitations and leg swelling.   Gastrointestinal: Negative for abdominal distention, nausea and vomiting.   Genitourinary: Negative for dysuria, flank pain and hematuria.   Musculoskeletal: Negative for gait problem and joint swelling.   Skin: Negative for rash and wound.   Neurological: Positive for facial asymmetry, speech difficulty and headaches. Negative for  seizures, syncope and numbness.     Objective:     Vital Signs (Most Recent):  Temp: 98.7 °F (37.1 °C) (09/17/19 2053)  Pulse: 62 (09/17/19 2033)  Resp: 18 (09/17/19 2033)  BP: 120/77 (09/17/19 2033)  SpO2: 100 % (09/17/19 2033) Vital Signs (24h Range):  Temp:  [98.5 °F (36.9 °C)-98.7 °F (37.1 °C)] 98.7 °F (37.1 °C)  Pulse:  [61-91] 62  Resp:  [16-18] 18  SpO2:  [100 %] 100 %  BP: (105-140)/(57-80) 120/77     Weight: 67.6 kg (149 lb)  Body mass index is 26.39 kg/m².    Physical Exam   Constitutional: She is oriented to person, place, and time. She appears well-developed and well-nourished. No distress.   HENT:   Head: Normocephalic and atraumatic.   Right Ear: External ear normal.   Left Ear: External ear normal.   Eyes: Conjunctivae and EOM are normal. Right eye exhibits no discharge. Left eye exhibits no discharge.   Neck: Normal range of motion. No thyromegaly present.   Cardiovascular: Normal rate and regular rhythm.   No murmur heard.  Pulmonary/Chest: Effort normal and breath sounds normal. No respiratory distress.   Abdominal: Soft. Bowel sounds are normal. She exhibits no distension and no mass. There is no tenderness.   Musculoskeletal: She exhibits no edema or deformity.   Neurological: She is alert and oriented to person, place, and time.   5/5 strength upper and lower extremity. Able to open mouth and protrude jaw as well as move left and right. No sensation deficit. No slurred speech noted   Skin: Skin is warm and dry.   Psychiatric: She has a normal mood and affect. Her behavior is normal.   Nursing note and vitals reviewed.        CRANIAL NERVES     CN III, IV, VI   Extraocular motions are normal.        Significant Labs:   CBC:   Recent Labs   Lab 09/17/19 1806   WBC 8.72   HGB 9.5*   HCT 31.9*        CMP:   Recent Labs   Lab 09/17/19  1806      K 3.7      CO2 23   GLU 68*   BUN 14   CREATININE 1.0   CALCIUM 8.7   PROT 7.8   ALBUMIN 3.6   BILITOT 0.2   ALKPHOS 78   AST 39   ALT 23    ANIONGAP 8   EGFRNONAA >60     TSH:   Recent Labs   Lab 09/17/19  1806   TSH 12.744*       Significant Imaging:   Imaging Results          CT Head Without Contrast (Final result)  Result time 09/17/19 18:20:30    Final result by Parul Banegas MD (09/17/19 18:20:30)                 Impression:      No acute intracranial abnormality.      Electronically signed by: Parul Banegas  Date:    09/17/2019  Time:    18:20             Narrative:    EXAMINATION:  CT OF THE HEAD WITHOUT    CLINICAL HISTORY:  Focal neuro deficit, new, fixed or worsening, >6 hours;    TECHNIQUE:  5 mm unenhanced axial images were obtained from the skull base to the vertex.    COMPARISON:  None.    FINDINGS:  The ventricles, basal cisterns, and cortical sulci are within normal limits for patient's stated age.  There is an age-indeterminate lacune in the right caudate head (series 2 axial image 13).  There is no acute intracranial hemorrhage, territorial infarct or mass effect, or midline shift. The visualized paranasal sinuses and mastoid air cells are clear.

## 2019-09-18 NOTE — HOSPITAL COURSE
Mr. Rosales is a 49 yo female who was admitted to observation for right ear pounding pain, dysarthria, and right eye/facial twitching. Stroke ruled out. MRA brain neck no occlusion or stenosis. 2 D echo pending. Neurology felt dysarthria possible from migraine and right facial facial twitching appeared to be hemifacial spasm. SLP did not observe difficulty getting words out and right facial twitching during time of evaluation and recommended no follow up indicated at this time.  Continue home topiramate and close follow up with primary neurologist at [facility]. Referred to ENT at [facility] for ongoing ear pain x 1 week. Patient is hemodynamically stable for discharge home with close follow up Neurology and ENT.

## 2019-09-18 NOTE — NURSING TRANSFER
Nursing Transfer Note      09/17/2019    Transfer From: ED To: 331B    Transfer via stretcher    Transfer with cardiac monitoring    Transported by transport personnel    Medicines sent: none    Chart send with patient: Yes    Notified: family    Patient reassessed at 2130 on September 17, 2019    Upon arrival to floor patient walked to bed from stretcher with no deficits noted. Cardiac monitoring applied and patient oriented to room. Vital signs obtained and can be found in flow sheets with complete patient assessment. Skin dry and intact with a 20g RAC PIv noted saline locked. Complains of headache pain noted and to be treated but no signs of respiratory distress. Neurology consulted. Plan to monitor neuro status, plan for MRI/MRA of brain and neck in the am, and await further input from the consulted provider. Patient updated on plan of care and verbalized understanding. Call light in reach and patient instructed to inform the nurse if anything is needed. Patient stable and will continue to be monitored.

## 2019-09-18 NOTE — PLAN OF CARE
"   09/18/19 1239   Final Note   Assessment Type Final Discharge Note   Anticipated Discharge Disposition Home   Hospital Follow Up  Appt(s) scheduled? Yes   Discharge plans and expectations educations in teach back method with documentation complete? Yes   Right Care Referral Info   Post Acute Recommendation No Care   pts nurse Berna notified that all CM needs have been addressed and can d/c from CM standpoint    EDUCATION:   provided with educational information on dysarthria.  Information reviewed and placed in :My Healthcare Packet" to be brought home  to use as resource after discharge.  Information included:  signs and symptoms to look for and call the doctor if experiencing, and symptoms that may indicate a medical emergency: CALL 911.      All questions answered.  Teach back method used.    Patient stated, " will go to follow up appointment and take medications as prescribed ".        "

## 2019-09-18 NOTE — PT/OT/SLP PROGRESS
Speech Language Pathology      Magalis Rosales  MRN: [id number]    Patient not seen today secondary to CT/MRI. Will follow-up when pt returns from testing.    Diane Valdes, CCC-SLP

## 2019-09-18 NOTE — PT/OT/SLP EVAL
Speech Language Pathology Evaluation  Cognitive/Bedside Swallow    Patient Name:  Magalis Rosales   MRN:  [id number]  Admitting Diagnosis: Dysarthria    Recommendations:                  General Recommendations:  Follow-up not indicated  Diet recommendations:  Regular, Thin   Aspiration Precautions: 1 bite/sip at a time, Alternating bites/sips, Small bites/sips and Standard aspiration precautions   General Precautions: Standard,    Communication strategies:  none    History:     Past Medical History:   Diagnosis Date    History of psychiatric care     History of psychiatric hospitalization     Hypothyroidism     Migraines     Psychiatric exam requested by authority     Psychiatric problem     Therapy        Past Surgical History:   Procedure Laterality Date    APPENDECTOMY       SECTION         Social History: Patient independent at home.    Prior diet: unrestricted    Subjective     Pt reported difficulty getting words out on occasion as well as right facial twitching. This behavior was not observed at time of eval. Pt denied swallowing difficulty.      Pain/Comfort:  · Pain Rating 1: 0/10    Objective:       Objective:     COGNITIVE STATUS:  Behavioral Observations: alert and cooperative-  Memory:  · Immediate: % accuracy with all number and word lists  · Short Term: WFL recalled 3/3 words s/p a 5 minute delay with no cues  · Long Term: % accuracy  Orientation: Oriented x4   Attention: WFL.  Problem Solving: % accuracy  Insight Awareness: pt with good insight   Sequencing: % accuracy with 4 word mental manipulation task; pt with accurate sequencing of functional events  Pragmatics: WNL      LANGUAGE:    Receptive Language:   Simple y/n Questions: % accuracy  Complex y/n Questions: % accuracy  Identification: % accuracy  1 Step Directions: % accuracy  2 Step Directions: % accuracy  Complex Directions: % accuracy    Expressive  Language:  Naming: named 15 animals in 1 minute (norm 15-20)  Automatic Speech: % accuracy  Sentence Completion: % accuracy  Responsive Speech: % accuracy  Repetition: % accuracy      Motor Speech: No noted Dysarthria, Apraxia of Speech, Ataxia    Voice: adequate volumate, rate, prosody, breath support    Augmentative Alternative Communication: no    Reading: Pt read paragrpah with no noted difficulties; answered comprehension questions with 100% accuracy    Writing: WFL    Visual-Spatial: WFL; no noted neglect        Oral Musculature Evaluation  · Oral Musculature: WFL  · Dentition: present and adequate  · Mucosal Quality: good  · Mandibular Strength and Mobility: WFL  · Oral Labial Strength and Mobility: WFL  · Lingual Strength and Mobility: WFL  · Velar Elevation: WFL  · Buccal Strength and Mobility: WFL  · Voice Prior to PO Intake: adequate volume/ clear quality  · Oral Musculature Comments: WFL- lingual & labial ROM, coordination, & strength    Bedside Swallow Eval:   Consistencies Assessed:  · Thin liquids via straw x3 self presented  · Puree x2 trials  · Soft solids x4 trials     Oral Phase:   · WFL    Pharyngeal Phase:   · no overt clinical signs/symptoms of aspiration    Compensatory Strategies  · None    Treatment: No f/u necessary at this time    Assessment:     Magalis Rosales is a 48 y.o. female with a functional swallow and speech and language skills WFL. Pt does not exhibit overt s/s of aspiration across consistencies.  Pt did not exhibit speech or language impairments at time of evaluation. Pt educated on compensatory strategies to improve speech production in the event that intelligibility decreases.    Goals:   Multidisciplinary Problems     SLP Goals     Not on file                Plan:     · Plan of Care expires:  09/18/19  · Plan of Care reviewed with:  patient   · SLP Follow-Up:  No       Discharge recommendations:      Barriers to Discharge:  None    Time Tracking:      SLP Treatment Date:   09/18/19  Speech Start Time:  1214  Speech Stop Time:  1232     Speech Total Time (min):  18 min    Billable Minutes: Eval 9 min  and Eval Swallow and Oral Function 9 min    Diane Valdes CCC-SLP  09/18/2019

## 2019-09-20 ENCOUNTER — OFFICE VISIT (OUTPATIENT)
Dept: FAMILY MEDICINE | Facility: CLINIC | Age: 49
End: 2019-09-20
Payer: COMMERCIAL

## 2019-09-20 VITALS
SYSTOLIC BLOOD PRESSURE: 120 MMHG | OXYGEN SATURATION: 99 % | BODY MASS INDEX: 29.61 KG/M2 | TEMPERATURE: 98 F | WEIGHT: 167.13 LBS | DIASTOLIC BLOOD PRESSURE: 84 MMHG | HEART RATE: 78 BPM | HEIGHT: 63 IN

## 2019-09-20 DIAGNOSIS — F33.41 RECURRENT MAJOR DEPRESSIVE DISORDER, IN PARTIAL REMISSION: ICD-10-CM

## 2019-09-20 DIAGNOSIS — G43.829 MENSTRUAL MIGRAINE WITHOUT STATUS MIGRAINOSUS, NOT INTRACTABLE: ICD-10-CM

## 2019-09-20 DIAGNOSIS — E03.9 ACQUIRED HYPOTHYROIDISM: ICD-10-CM

## 2019-09-20 DIAGNOSIS — G51.4 FACIAL TWITCHING: Primary | ICD-10-CM

## 2019-09-20 PROCEDURE — 99999 PR PBB SHADOW E&M-EST. PATIENT-LVL III: ICD-10-PCS | Mod: PBBFAC,,, | Performed by: INTERNAL MEDICINE

## 2019-09-20 PROCEDURE — 3008F PR BODY MASS INDEX (BMI) DOCUMENTED: ICD-10-PCS | Mod: CPTII,S$GLB,, | Performed by: INTERNAL MEDICINE

## 2019-09-20 PROCEDURE — 99214 PR OFFICE/OUTPT VISIT, EST, LEVL IV, 30-39 MIN: ICD-10-PCS | Mod: S$GLB,,, | Performed by: INTERNAL MEDICINE

## 2019-09-20 PROCEDURE — 99999 PR PBB SHADOW E&M-EST. PATIENT-LVL III: CPT | Mod: PBBFAC,,, | Performed by: INTERNAL MEDICINE

## 2019-09-20 PROCEDURE — 3008F BODY MASS INDEX DOCD: CPT | Mod: CPTII,S$GLB,, | Performed by: INTERNAL MEDICINE

## 2019-09-20 PROCEDURE — 99214 OFFICE O/P EST MOD 30 MIN: CPT | Mod: S$GLB,,, | Performed by: INTERNAL MEDICINE

## 2019-09-20 RX ORDER — LEVOTHYROXINE SODIUM 175 UG/1
175 TABLET ORAL DAILY
Qty: 90 TABLET | Refills: 3 | Status: SHIPPED | OUTPATIENT
Start: 2019-09-20 | End: 2019-12-24 | Stop reason: DRUGHIGH

## 2019-09-20 RX ORDER — TOPIRAMATE 50 MG/1
50 TABLET, FILM COATED ORAL 2 TIMES DAILY
Qty: 60 TABLET | Refills: 3 | Status: SHIPPED | OUTPATIENT
Start: 2019-09-20 | End: 2019-10-31

## 2019-09-20 RX ORDER — TOPIRAMATE SPINKLE 15 MG/1
30 CAPSULE ORAL DAILY
Qty: 1 CAPSULE | Refills: 0 | Status: CANCELLED
Start: 2019-09-20 | End: 2020-09-19

## 2019-09-20 RX ORDER — SUMATRIPTAN 50 MG/1
TABLET, FILM COATED ORAL
Qty: 21 TABLET | Refills: 1 | Status: SHIPPED | OUTPATIENT
Start: 2019-09-20 | End: 2020-02-27 | Stop reason: SDUPTHER

## 2019-09-20 RX ORDER — SERTRALINE HYDROCHLORIDE 100 MG/1
100 TABLET, FILM COATED ORAL DAILY
Qty: 90 TABLET | Refills: 1 | Status: SHIPPED | OUTPATIENT
Start: 2019-09-20 | End: 2019-10-02

## 2019-09-20 RX ORDER — LANOLIN ALCOHOL/MO/W.PET/CERES
400 CREAM (GRAM) TOPICAL DAILY
Qty: 90 TABLET | Refills: 3 | Status: SHIPPED | OUTPATIENT
Start: 2019-09-20 | End: 2020-06-15 | Stop reason: CLARIF

## 2019-09-20 NOTE — PROGRESS NOTES
"Subjective:       Patient ID: Magalis Rosales is a 48 y.o. female.    Chief Complaint: ER Follow UP (concerned about what is going on ); facial twitching; and Aphasia    F/u facial twitching    HPI: 49 y/o with migraine headaches MDD presents for follow up of right facial twitching x four months.she was seen in ed three days ago for these symptoms and subjective difficulty speaking. MRI/MRA was negative. Labs unremarkable except for elevated TSH with normal free T4. She is taking same dose of synthroid daily. No diarrhea or loose stool no vision changes. She does have one week of right ear "pounding" that improved with removal of impacted cerumen no drainage from ear no vertigo    Review of Systems   Constitutional: Negative for activity change, appetite change, fatigue, fever and unexpected weight change.   HENT: Negative for ear pain, rhinorrhea and sore throat.    Eyes: Negative for discharge and visual disturbance.   Respiratory: Negative for chest tightness, shortness of breath and wheezing.    Cardiovascular: Negative for chest pain, palpitations and leg swelling.   Gastrointestinal: Negative for abdominal pain, constipation and diarrhea.   Endocrine: Negative for cold intolerance and heat intolerance.   Genitourinary: Negative for dysuria and hematuria.   Musculoskeletal: Negative for joint swelling and neck stiffness.   Skin: Negative for rash.   Neurological: Negative for dizziness, syncope, weakness and headaches.   Psychiatric/Behavioral: Negative for suicidal ideas.       Objective:     Vitals:    09/20/19 1620   BP: 120/84   Pulse: 78   Temp: 98.3 °F (36.8 °C)   TempSrc: Oral   SpO2: 99%   Weight: 75.8 kg (167 lb 1.7 oz)   Height: 5' 3" (1.6 m)          Physical Exam   Constitutional: She is oriented to person, place, and time. She appears well-developed and well-nourished.   HENT:   Head: Normocephalic and atraumatic.   Right Ear: Tympanic membrane normal.   Left Ear: Tympanic membrane normal.   Eyes: " Pupils are equal, round, and reactive to light. Conjunctivae and EOM are normal. No scleral icterus.   Neck: Normal range of motion.   Cardiovascular: Normal rate and regular rhythm. Exam reveals no gallop and no friction rub.   No murmur heard.  Pulmonary/Chest: Effort normal and breath sounds normal. She has no wheezes. She has no rales.   Abdominal: Soft. Bowel sounds are normal. There is no tenderness. There is no rebound and no guarding.   Musculoskeletal: Normal range of motion. She exhibits no edema or tenderness.   Neurological: She is alert and oriented to person, place, and time. No cranial nerve deficit.   Right supraorbital muscle twitching no fasiculation with tapping of maxilla or chin    Skin: Skin is warm and dry.   Psychiatric: She has a normal mood and affect.       Assessment and Plan   1. Menstrual migraine without status migrainosus, not intractable  Using sumatriptatn sparingly (four times in last month)  - sumatriptan (IMITREX) 50 MG tablet; TAKE 1 TABLET BY MOUTH AT ONSET OF HEADACHE, MAY REPEAT IN 2 HOURS. NOT MORE THAN 2 IN 24 HOURS  Dispense: 21 tablet; Refill: 1  - topiramate (TOPAMAX) 50 MG tablet; Take 1 tablet (50 mg total) by mouth 2 (two) times daily.  Dispense: 60 tablet; Refill: 3    2. Acquired hypothyroidism  Normal free t4   - levothyroxine (SYNTHROID, LEVOTHROID) 175 MCG tablet; Take 1 tablet (175 mcg total) by mouth once daily.  Dispense: 90 tablet; Refill: 3    3. Facial twitching  Add once daily magnesium keep follow up with neuro consideration of nerve conduction study  - magnesium oxide (MAG-OX) 400 mg (241.3 mg magnesium) tablet; Take 1 tablet (400 mg total) by mouth once daily.  Dispense: 90 tablet; Refill: 3    4. Recurrent major depressive disorder, in partial remission  Stable continue ssri  - sertraline (ZOLOFT) 100 MG tablet; Take 1 tablet (100 mg total) by mouth once daily. Take 1 and half tab by mouth every day  Dispense: 90 tablet; Refill: 1

## 2019-09-30 ENCOUNTER — PATIENT MESSAGE (OUTPATIENT)
Dept: FAMILY MEDICINE | Facility: CLINIC | Age: 49
End: 2019-09-30

## 2019-10-01 ENCOUNTER — PATIENT MESSAGE (OUTPATIENT)
Dept: FAMILY MEDICINE | Facility: CLINIC | Age: 49
End: 2019-10-01

## 2019-10-01 DIAGNOSIS — F33.41 RECURRENT MAJOR DEPRESSIVE DISORDER, IN PARTIAL REMISSION: ICD-10-CM

## 2019-10-02 RX ORDER — SERTRALINE HYDROCHLORIDE 100 MG/1
100 TABLET, FILM COATED ORAL DAILY
Qty: 90 TABLET | Refills: 1
Start: 2019-10-02 | End: 2020-07-07

## 2019-10-03 ENCOUNTER — PATIENT MESSAGE (OUTPATIENT)
Dept: FAMILY MEDICINE | Facility: CLINIC | Age: 49
End: 2019-10-03

## 2019-10-31 ENCOUNTER — PATIENT MESSAGE (OUTPATIENT)
Dept: FAMILY MEDICINE | Facility: CLINIC | Age: 49
End: 2019-10-31

## 2019-10-31 DIAGNOSIS — G43.829 MENSTRUAL MIGRAINE WITHOUT STATUS MIGRAINOSUS, NOT INTRACTABLE: ICD-10-CM

## 2019-10-31 RX ORDER — TOPIRAMATE 100 MG/1
100 TABLET, FILM COATED ORAL 2 TIMES DAILY
Qty: 60 TABLET | Refills: 3 | Status: SHIPPED | OUTPATIENT
Start: 2019-10-31 | End: 2020-04-13

## 2019-11-22 ENCOUNTER — PATIENT OUTREACH (OUTPATIENT)
Dept: ADMINISTRATIVE | Facility: OTHER | Age: 49
End: 2019-11-22

## 2019-11-25 ENCOUNTER — TELEPHONE (OUTPATIENT)
Dept: NEUROLOGY | Facility: CLINIC | Age: 49
End: 2019-11-25

## 2019-11-25 ENCOUNTER — TELEPHONE (OUTPATIENT)
Dept: OTOLARYNGOLOGY | Facility: CLINIC | Age: 49
End: 2019-11-25

## 2019-11-25 ENCOUNTER — OFFICE VISIT (OUTPATIENT)
Dept: NEUROLOGY | Facility: CLINIC | Age: 49
End: 2019-11-25
Payer: COMMERCIAL

## 2019-11-25 VITALS
BODY MASS INDEX: 29.06 KG/M2 | DIASTOLIC BLOOD PRESSURE: 85 MMHG | SYSTOLIC BLOOD PRESSURE: 136 MMHG | HEIGHT: 63 IN | HEART RATE: 80 BPM | WEIGHT: 164 LBS

## 2019-11-25 DIAGNOSIS — G43.109 MIGRAINE WITH AURA AND WITHOUT STATUS MIGRAINOSUS, NOT INTRACTABLE: ICD-10-CM

## 2019-11-25 DIAGNOSIS — H92.09 OTALGIA, UNSPECIFIED LATERALITY: ICD-10-CM

## 2019-11-25 DIAGNOSIS — G51.39 HEMIFACIAL SPASM: Primary | ICD-10-CM

## 2019-11-25 PROCEDURE — 3008F PR BODY MASS INDEX (BMI) DOCUMENTED: ICD-10-PCS | Mod: CPTII,S$GLB,, | Performed by: PSYCHIATRY & NEUROLOGY

## 2019-11-25 PROCEDURE — 99214 OFFICE O/P EST MOD 30 MIN: CPT | Mod: S$GLB,,, | Performed by: PSYCHIATRY & NEUROLOGY

## 2019-11-25 PROCEDURE — 99999 PR PBB SHADOW E&M-EST. PATIENT-LVL IV: ICD-10-PCS | Mod: PBBFAC,,, | Performed by: PSYCHIATRY & NEUROLOGY

## 2019-11-25 PROCEDURE — 99214 PR OFFICE/OUTPT VISIT, EST, LEVL IV, 30-39 MIN: ICD-10-PCS | Mod: S$GLB,,, | Performed by: PSYCHIATRY & NEUROLOGY

## 2019-11-25 PROCEDURE — 3008F BODY MASS INDEX DOCD: CPT | Mod: CPTII,S$GLB,, | Performed by: PSYCHIATRY & NEUROLOGY

## 2019-11-25 PROCEDURE — 99999 PR PBB SHADOW E&M-EST. PATIENT-LVL IV: CPT | Mod: PBBFAC,,, | Performed by: PSYCHIATRY & NEUROLOGY

## 2019-11-25 NOTE — TELEPHONE ENCOUNTER
----- Message from Ilda Bailey MD sent at 11/25/2019 12:54 PM CST -----  Yes that disease is seen by movements disorders thanks!    ----- Message -----  From: Thelma Petersen MA  Sent: 11/25/2019  10:53 AM CST  To: Ilda Bailey MD    Confirming if you see pt with Hemifacial spasm?      ----- Message -----  From: Familia Fay MA  Sent: 11/25/2019   9:42 AM CST  To: [facility] Staff    Appointment is needed for the above patient. Can you give the patient a call to set up an appointment per Dr. Hermosillo.      JAMES Barbosa  Neurology [facility]   P#[phone]

## 2019-11-25 NOTE — TELEPHONE ENCOUNTER
Tried calling patient to notify her that an appt has been made for ENT referral that was requested by Dr Hermosillo. No answer, left voicemail for her to call the office back.

## 2019-11-25 NOTE — TELEPHONE ENCOUNTER
----- Message from Familia Fay MA sent at 11/25/2019  9:42 AM CST -----  Appointment is needed for the above patient. Can you give the patient a call to set up an appointment per Dr. Hermosillo.      JAMES Barbosa  Neurology [facility]   P#[phone]

## 2019-11-25 NOTE — TELEPHONE ENCOUNTER
----- Message from Asia Charlton sent at 11/25/2019  4:02 PM CST -----  Contact: pt [phone]  Returning a call to Thelma regarding scheduling . Please call back

## 2019-11-25 NOTE — PROGRESS NOTES
Neurology Consult Note    Chief Complaint: follow up from hospital for headache and slurred speech    HPI:   Magalis Rosales is a 48 y.o. female with medical conditions who presents as follow up from the hospital for headache and slurred speech. She states she has a history of migraines and she had her typical migraine when she developed slurred speech. She states she had a gradual onset left sided throbbing headache. She states the right side of her face was twitching and this is what concerned her so she came to the hospital. She also reports ear pain on the left. She had an Mri brain and MRA head and neck in the hospital which was unremarkable. She is on topamax and sumatriptan for migraine which seem to be helping. She states she gets frequent right facial twitching without slurred speech or headache. She has no further complaints.      Past Medical History:  Past Medical History:   Diagnosis Date    History of psychiatric care     History of psychiatric hospitalization     Hypothyroidism     Migraines     Psychiatric exam requested by [facility]     Psychiatric problem     Therapy        Past Surgical History:  Past Surgical History:   Procedure Laterality Date    APPENDECTOMY       SECTION         Social History:  Social History     Socioeconomic History    Marital status: Single     Spouse name: Not on file    Number of children: 1    Years of education: Not on file    Highest education level: Not on file   Occupational History    Occupation: Unemployed - substitute teaching   Social Needs    Financial resource strain: Not on file    Food insecurity:     Worry: Not on file     Inability: Not on file    Transportation needs:     Medical: Not on file     Non-medical: Not on file   Tobacco Use    Smoking status: Never Smoker    Smokeless tobacco: Never Used   Substance and Sexual Activity    Alcohol use: No    Drug use: No    Sexual activity: Not Currently     Partners: Male    Lifestyle    Physical activity:     Days per week: Not on file     Minutes per session: Not on file    Stress: Not on file   Relationships    Social connections:     Talks on phone: Not on file     Gets together: Not on file     Attends Religion service: Not on file     Active member of club or organization: Not on file     Attends meetings of clubs or organizations: Not on file     Relationship status: Not on file   Other Topics Concern    Patient feels they ought to cut down on drinking/drug use Not Asked    Patient annoyed by others criticizing their drinking/drug use Not Asked    Patient has felt bad or guilty about drinking/drug use Not Asked    Patient has had a drink/used drugs as an eye opener in the AM Not Asked   Social History Narrative    Not on file       Family History:  Family History   Problem Relation Age of Onset    Hypertension Mother     Hypertension Father     Stroke Father        Medications:  Current Outpatient Medications   Medication Sig Dispense Refill    levothyroxine (SYNTHROID, LEVOTHROID) 175 MCG tablet Take 1 tablet (175 mcg total) by mouth once daily. 90 tablet 3    magnesium oxide (MAG-OX) 400 mg (241.3 mg magnesium) tablet Take 1 tablet (400 mg total) by mouth once daily. 90 tablet 3    sertraline (ZOLOFT) 100 MG tablet Take 1 tablet (100 mg total) by mouth once daily. 90 tablet 1    sumatriptan (IMITREX) 50 MG tablet TAKE 1 TABLET BY MOUTH AT ONSET OF HEADACHE, MAY REPEAT IN 2 HOURS. NOT MORE THAN 2 IN 24 HOURS 21 tablet 1    topiramate (TOPAMAX) 100 MG tablet Take 1 tablet (100 mg total) by mouth 2 (two) times daily. 60 tablet 3     No current facility-administered medications for this visit.        Allergies:  Review of patient's allergies indicates:  No Known Allergies    ROS:  A 12 point review of system was negative aside from pertinent positives and negatives as outlined above.    Physical Exam  Vitals:    11/25/19 0832   BP: 136/85   Pulse: 80        General: well nourished, well developed  Eyes: no scleral icterus   Nose: nasal turbinates intact  Neck: supple, ROM intact  Skin: no rash or ecchymosis  Joints: no swelling or erythema  Cardiac: regular rate and rhythm  Lungs: clear to auscultation bilaterally    Neuro:  Mental status: AAO x 3, no dysarthria, no aphasia, communicating appropriately  CN: PERRL, EOMI, VFF, V1-V3 sensation intact, no facial asymmetry, hearing grossly intact, tongue midline  Motor:   RUE 5/5  RLE 5/5  LUE 5/5  LLE 5/5    Normal bulk and tone    Reflexes: 2+ throughout, toes equivocal bilaterally  Sensory: intact to light touch throughout  Coordination: no dysmetria on FTN  Gait: steady    Prior Imaging/Labs:  Reviewed      Assessment and Plan:    48 y.o. female with episode of headache and slurred speech likely due to migraine w/aura and right facial twitching likely due to hemifacial spasm.     1. Hemifacial spasm  - Ambulatory Referral to Dr. Bailey for consideration of botox    2. Otalgia  - Ambulatory Referral to ENT    3. Migraine w/aura  C/w current regimen  Will reevaluate migraines at next visit        Patient was advised to notify me for worsening symptoms. I will see patient back in 3 months or sooner if necessary.     Thank you for this consultation.    Corin Kidd DO  [facility]  [address] [facility]  [place] [address]  [phone]

## 2019-11-29 ENCOUNTER — OFFICE VISIT (OUTPATIENT)
Dept: NEUROLOGY | Facility: CLINIC | Age: 49
End: 2019-11-29
Payer: COMMERCIAL

## 2019-11-29 VITALS
HEIGHT: 63 IN | BODY MASS INDEX: 29.06 KG/M2 | HEART RATE: 73 BPM | DIASTOLIC BLOOD PRESSURE: 77 MMHG | SYSTOLIC BLOOD PRESSURE: 111 MMHG

## 2019-11-29 DIAGNOSIS — G51.0 BELL'S PALSY: ICD-10-CM

## 2019-11-29 DIAGNOSIS — G51.39 HEMIFACIAL SPASM: ICD-10-CM

## 2019-11-29 PROCEDURE — 99999 PR PBB SHADOW E&M-EST. PATIENT-LVL III: ICD-10-PCS | Mod: PBBFAC,,, | Performed by: PSYCHIATRY & NEUROLOGY

## 2019-11-29 PROCEDURE — 99999 PR PBB SHADOW E&M-EST. PATIENT-LVL III: CPT | Mod: PBBFAC,,, | Performed by: PSYCHIATRY & NEUROLOGY

## 2019-11-29 PROCEDURE — 3008F PR BODY MASS INDEX (BMI) DOCUMENTED: ICD-10-PCS | Mod: CPTII,S$GLB,, | Performed by: PSYCHIATRY & NEUROLOGY

## 2019-11-29 PROCEDURE — 99214 PR OFFICE/OUTPT VISIT, EST, LEVL IV, 30-39 MIN: ICD-10-PCS | Mod: S$GLB,,, | Performed by: PSYCHIATRY & NEUROLOGY

## 2019-11-29 PROCEDURE — 3008F BODY MASS INDEX DOCD: CPT | Mod: CPTII,S$GLB,, | Performed by: PSYCHIATRY & NEUROLOGY

## 2019-11-29 PROCEDURE — 99214 OFFICE O/P EST MOD 30 MIN: CPT | Mod: S$GLB,,, | Performed by: PSYCHIATRY & NEUROLOGY

## 2019-11-29 RX ORDER — GABAPENTIN 100 MG/1
100 CAPSULE ORAL 3 TIMES DAILY
Qty: 90 CAPSULE | Refills: 11 | Status: SHIPPED | OUTPATIENT
Start: 2019-11-29 | End: 2020-06-15 | Stop reason: CLARIF

## 2019-11-29 NOTE — ASSESSMENT & PLAN NOTE
R sided facial droop, tinnitus, Hemifacial spasm consistent with peripheral nerve injury. MRI brain and MRA did not reveal an inciting lesion. Suggested gabapentin for tinnitus. Suggested botox should she want to treat her HFS.

## 2019-11-29 NOTE — LETTER
November 29, 2019      Corin Kidd, DO  [address] [facility]  [address]  [place] [address]           [facility]  [address]  [place] [address]  Phone: [phone]  Fax: [phone]          Patient: Magalis Rosales   MR Number: [id number]   YOB: 1970   Date of Visit: 11/29/2019       Dear Dr. Corin Kidd:    Thank you for referring Magalis Rosales to me for evaluation. Attached you will find relevant portions of my assessment and plan of care.    If you have questions, please do not hesitate to call me. I look forward to following Magalis Rosales along with you.    Sincerely,    Ilda Bailey MD    Enclosure  CC:  No Recipients    If you would like to receive this communication electronically, please contact [email or web address] or [phone] to request more information on [employer] Link access.    For providers and/or their staff who would like to refer a patient to [facility], please contact us through our one-stop-shop provider referral line, [facility], at [phone].    If you feel you have received this communication in error or would no longer like to receive these types of communications, please e-mail [email or web address]

## 2019-11-29 NOTE — PROGRESS NOTES
MOVEMENT DISORDERS CLINIC NEW CONSULT NOTE    PCP/Referring Provider: Corin Kidd, DO  [address]  [address]  [place] [address]  Date of Service: 11/29/2019    Chief Complaint: twitching    HPI: Magalis Rosales is a R HANDED 48 y.o. female with a medical issues significant for hypothyroidism, migraines, who presents with R facial twitching since Summer 2019. She noted her R face and R eye would twitch. She notes also A R ear ringing (episodic) with L cheek pain around the same time. No obvious bell's palsy beforehand. No change to taste. She visited the ER for transient slurred speech September. MRI brain wwo normal and MRA normal. No change to her facial sensation. No trouble swallowing.   No weightloss noted.      Review of Systems:   Review of Systems   Constitutional: Negative for fever.   HENT: Positive for tinnitus. Negative for congestion.    Eyes: Negative for double vision.   Respiratory: Negative for cough and shortness of breath.    Cardiovascular: Negative for chest pain and leg swelling.   Gastrointestinal: Negative for nausea.   Genitourinary: Negative for dysuria.   Musculoskeletal: Negative for falls.   Skin: Negative for rash.   Neurological: Positive for headaches. Negative for tremors, sensory change and speech change.   Psychiatric/Behavioral: Negative for depression.       Neuroleptic exposure:  None    Current Medications:  Outpatient Encounter Medications as of 11/29/2019   Medication Sig Dispense Refill    levothyroxine (SYNTHROID, LEVOTHROID) 175 MCG tablet Take 1 tablet (175 mcg total) by mouth once daily. 90 tablet 3    sertraline (ZOLOFT) 100 MG tablet Take 1 tablet (100 mg total) by mouth once daily. 90 tablet 1    sumatriptan (IMITREX) 50 MG tablet TAKE 1 TABLET BY MOUTH AT ONSET OF HEADACHE, MAY REPEAT IN 2 HOURS. NOT MORE THAN 2 IN 24 HOURS 21 tablet 1    topiramate (TOPAMAX) 100 MG tablet Take 1 tablet (100 mg total) by mouth 2 (two) times daily. 60 tablet 3     gabapentin (NEURONTIN) 100 MG capsule Take 1 capsule (100 mg total) by mouth 3 (three) times daily. 90 capsule 11    magnesium oxide (MAG-OX) 400 mg (241.3 mg magnesium) tablet Take 1 tablet (400 mg total) by mouth once daily. (Patient not taking: Reported on 2019) 90 tablet 3     No facility-administered encounter medications on file as of 2019.        Past Medical History:  Patient Active Problem List   Diagnosis    Uterine fibroid    Dysarthria    Hypothyroid    Migraines    Bell's palsy    Hemifacial spasm       Past Surgical History:  Past Surgical History:   Procedure Laterality Date    APPENDECTOMY       SECTION         Current Living Situation: home    Social:  Social History     Socioeconomic History    Marital status: Single     Spouse name: Not on file    Number of children: 1    Years of education: Not on file    Highest education level: Not on file   Occupational History    Occupation: Unemployed - substitute teaching   Social Needs    Financial resource strain: Not on file    Food insecurity:     Worry: Not on file     Inability: Not on file    Transportation needs:     Medical: Not on file     Non-medical: Not on file   Tobacco Use    Smoking status: Never Smoker    Smokeless tobacco: Never Used   Substance and Sexual Activity    Alcohol use: No    Drug use: No    Sexual activity: Not Currently     Partners: Male   Lifestyle    Physical activity:     Days per week: Not on file     Minutes per session: Not on file    Stress: Not on file   Relationships    Social connections:     Talks on phone: Not on file     Gets together: Not on file     Attends Zoroastrianism service: Not on file     Active member of club or organization: Not on file     Attends meetings of clubs or organizations: Not on file     Relationship status: Not on file   Other Topics Concern    Patient feels they ought to cut down on drinking/drug use Not Asked    Patient annoyed by others  "criticizing their drinking/drug use Not Asked    Patient has felt bad or guilty about drinking/drug use Not Asked    Patient has had a drink/used drugs as an eye opener in the AM Not Asked   Social History Narrative    Not on file       Family History:  Family History   Problem Relation Age of Onset    Hypertension Mother     Hypertension Father     Stroke Father        PHYSICAL:  /77 (BP Location: Right arm, Patient Position: Sitting)   Pulse 73   Ht 5' 3" (1.6 m)   LMP 10/30/2019   BMI 29.06 kg/m²     General Medical Examination:  General: Good hygiene, appropriate appearance.  HEENT: Normocephalic, atraumatic.   Neck: Supple.   Chest: Unlabored breathing.   CV: Symmetric pulses.   Ext: No clubbing, cyanosis, or edema.     Mental Status:  Mood/Affect: Appropriate/congruent.  Level of consciousness: Awake, alert.  Orientation: Oriented to person, place, time and situation.  Language: No Dysarthria    Cranial nerves:  I: Not tested  II: PERRL, VFF to counting  III, IV, VI: EOMI with conjugate gaze and no nystagmus on end gaze  V: Facial sensation intact and symmetric over the bilateral V1-V3  VII: Facial muscle activation Decreased On R  VIII: Hearing intact in the b/l ears and symmetrical to finger rub  IX, X, XII: TUP midline - no atrophy or fasiculations  X: SCMs and shoulder shrug full strength b/l and symmetric    Mild HFS R in V1-2-3    Motor:   -UE: 5/5 deltoids; 5/5 biceps, triceps; 5/5 wrist flexors, extensors; 5/5 interosseous; 5/5   -LEs: 5/5 hip flexion, extension; 5/5 knee flexion, extension; 5/5 ankle flexion, extension      DTRs:  ? Biceps Triceps Brachioradialis Knee Ankle   Left 2+ 2+ 2+ 2+ 2+   Right 2+ 2+ 2+ 2+ 2+     ? Finger taps Finger flicks ALMA Heel taps   Left 0 0 0 0   Right 0 0 0 0   Neck tone: 0  ? Arm Leg   Left 0 0   Right 0 0     Sensation:   -Light touch: Intact and symmetric in the bilateral upper and lower extremities.  -Temp: Intact and symmetric in the " bilateral upper and lower extremities.  -Vibration: Intact and symmetric in the bilateral upper and lower extremities.  -Pin prick: Intact and symmetric in the bilateral upper and lower extremities.  -Proprioception: Intact and symmetric in the bilateral upper and lower extremities.    Coordination:   -Finger to nose: 0  -Heel to shin: 0    Gait:  -Arises from chair without use of hands.  -Casual gait is: nl based  -Stride length: nl    Laboratory Data:  NA    Imaging:  No central lesions or peripheral lesions to explain bell's palsy  Spoke with neuro-radiology who corroborated no peripheral or central lesions    Assessment//Plan:   Problem List Items Addressed This Visit        Neuro    Bell's palsy    Current Assessment & Plan     R sided facial droop, tinnitus, Hemifacial spasm consistent with peripheral nerve injury. MRI brain and MRA did not reveal an inciting lesion. Suggested gabapentin for tinnitus. Suggested botox should she want to treat her HFS.         Hemifacial spasm    Overview     From Bell's palsy               Follow-up: 6mos  Ilda Bailey MD, MS [facility]  Department of Neurology  Movement Disorders

## 2019-12-17 ENCOUNTER — PATIENT MESSAGE (OUTPATIENT)
Dept: FAMILY MEDICINE | Facility: CLINIC | Age: 49
End: 2019-12-17

## 2019-12-17 DIAGNOSIS — E03.9 HYPOTHYROIDISM, UNSPECIFIED TYPE: Primary | ICD-10-CM

## 2019-12-17 DIAGNOSIS — G43.809 OTHER MIGRAINE WITHOUT STATUS MIGRAINOSUS, NOT INTRACTABLE: ICD-10-CM

## 2019-12-18 ENCOUNTER — TELEPHONE (OUTPATIENT)
Dept: FAMILY MEDICINE | Facility: CLINIC | Age: 49
End: 2019-12-18

## 2019-12-18 NOTE — TELEPHONE ENCOUNTER
----- Message from Chanel Garcia sent at 12/18/2019 12:12 PM CST -----  Contact: Self   Type:  Patient Returning Call    Who Called:  Self     Who Left Message for Patient: Berenice     Does the patient know what this is regarding?: No     Would the patient rather a call back or a response via [facility]?  Call     Best Call Back Number:[phone]

## 2019-12-23 ENCOUNTER — LAB VISIT (OUTPATIENT)
Dept: LAB | Facility: HOSPITAL | Age: 49
End: 2019-12-23
Attending: INTERNAL MEDICINE
Payer: COMMERCIAL

## 2019-12-23 DIAGNOSIS — E03.9 HYPOTHYROIDISM, UNSPECIFIED TYPE: ICD-10-CM

## 2019-12-23 DIAGNOSIS — G43.809 OTHER MIGRAINE WITHOUT STATUS MIGRAINOSUS, NOT INTRACTABLE: ICD-10-CM

## 2019-12-23 LAB
ANION GAP SERPL CALC-SCNC: 6 MMOL/L (ref 8–16)
BASOPHILS # BLD AUTO: 0.07 K/UL (ref 0–0.2)
BASOPHILS NFR BLD: 0.7 % (ref 0–1.9)
BUN SERPL-MCNC: 15 MG/DL (ref 6–20)
CALCIUM SERPL-MCNC: 9.4 MG/DL (ref 8.7–10.5)
CHLORIDE SERPL-SCNC: 110 MMOL/L (ref 95–110)
CO2 SERPL-SCNC: 22 MMOL/L (ref 23–29)
CREAT SERPL-MCNC: 1.1 MG/DL (ref 0.5–1.4)
DIFFERENTIAL METHOD: ABNORMAL
EOSINOPHIL # BLD AUTO: 0.1 K/UL (ref 0–0.5)
EOSINOPHIL NFR BLD: 0.7 % (ref 0–8)
ERYTHROCYTE [DISTWIDTH] IN BLOOD BY AUTOMATED COUNT: 19.9 % (ref 11.5–14.5)
EST. GFR  (AFRICAN AMERICAN): >60 ML/MIN/1.73 M^2
EST. GFR  (NON AFRICAN AMERICAN): 59 ML/MIN/1.73 M^2
GLUCOSE SERPL-MCNC: 93 MG/DL (ref 70–110)
HCT VFR BLD AUTO: 33.9 % (ref 37–48.5)
HGB BLD-MCNC: 10.1 G/DL (ref 12–16)
IMM GRANULOCYTES # BLD AUTO: 0.03 K/UL (ref 0–0.04)
IMM GRANULOCYTES NFR BLD AUTO: 0.3 % (ref 0–0.5)
LYMPHOCYTES # BLD AUTO: 1.7 K/UL (ref 1–4.8)
LYMPHOCYTES NFR BLD: 16.3 % (ref 18–48)
MCH RBC QN AUTO: 23 PG (ref 27–31)
MCHC RBC AUTO-ENTMCNC: 29.8 G/DL (ref 32–36)
MCV RBC AUTO: 77 FL (ref 82–98)
MONOCYTES # BLD AUTO: 0.6 K/UL (ref 0.3–1)
MONOCYTES NFR BLD: 5.8 % (ref 4–15)
NEUTROPHILS # BLD AUTO: 7.9 K/UL (ref 1.8–7.7)
NEUTROPHILS NFR BLD: 76.2 % (ref 38–73)
NRBC BLD-RTO: 0 /100 WBC
PLATELET # BLD AUTO: 289 K/UL (ref 150–350)
PMV BLD AUTO: 11.8 FL (ref 9.2–12.9)
POTASSIUM SERPL-SCNC: 4.4 MMOL/L (ref 3.5–5.1)
RBC # BLD AUTO: 4.4 M/UL (ref 4–5.4)
SODIUM SERPL-SCNC: 138 MMOL/L (ref 136–145)
T4 FREE SERPL-MCNC: 0.99 NG/DL (ref 0.71–1.51)
TSH SERPL DL<=0.005 MIU/L-ACNC: 7.65 UIU/ML (ref 0.4–4)
WBC # BLD AUTO: 10.3 K/UL (ref 3.9–12.7)

## 2019-12-23 PROCEDURE — 85025 COMPLETE CBC W/AUTO DIFF WBC: CPT

## 2019-12-23 PROCEDURE — 84439 ASSAY OF FREE THYROXINE: CPT

## 2019-12-23 PROCEDURE — 84443 ASSAY THYROID STIM HORMONE: CPT

## 2019-12-23 PROCEDURE — 36415 COLL VENOUS BLD VENIPUNCTURE: CPT | Mod: PN

## 2019-12-23 PROCEDURE — 80048 BASIC METABOLIC PNL TOTAL CA: CPT

## 2019-12-24 ENCOUNTER — TELEPHONE (OUTPATIENT)
Dept: FAMILY MEDICINE | Facility: CLINIC | Age: 49
End: 2019-12-24

## 2019-12-24 DIAGNOSIS — E03.9 HYPOTHYROIDISM, UNSPECIFIED TYPE: Primary | ICD-10-CM

## 2019-12-24 RX ORDER — LEVOTHYROXINE SODIUM 200 UG/1
200 TABLET ORAL DAILY
Qty: 30 TABLET | Refills: 11 | Status: SHIPPED | OUTPATIENT
Start: 2019-12-24 | End: 2020-08-01 | Stop reason: SDUPTHER

## 2020-02-08 ENCOUNTER — PATIENT MESSAGE (OUTPATIENT)
Dept: FAMILY MEDICINE | Facility: CLINIC | Age: 50
End: 2020-02-08

## 2020-02-13 ENCOUNTER — OFFICE VISIT (OUTPATIENT)
Dept: OTOLARYNGOLOGY | Facility: CLINIC | Age: 50
End: 2020-02-13
Payer: COMMERCIAL

## 2020-02-13 ENCOUNTER — CLINICAL SUPPORT (OUTPATIENT)
Dept: AUDIOLOGY | Facility: CLINIC | Age: 50
End: 2020-02-13
Payer: COMMERCIAL

## 2020-02-13 VITALS
DIASTOLIC BLOOD PRESSURE: 70 MMHG | SYSTOLIC BLOOD PRESSURE: 116 MMHG | HEIGHT: 63 IN | BODY MASS INDEX: 29.28 KG/M2 | WEIGHT: 165.25 LBS

## 2020-02-13 DIAGNOSIS — H93.293 ABNORMAL AUDITORY PERCEPTION, BILATERAL: Primary | ICD-10-CM

## 2020-02-13 DIAGNOSIS — G43.809 OTHER MIGRAINE WITHOUT STATUS MIGRAINOSUS, NOT INTRACTABLE: ICD-10-CM

## 2020-02-13 DIAGNOSIS — Z01.10 NORMAL HEARING EXAM: ICD-10-CM

## 2020-02-13 DIAGNOSIS — Z01.10 NORMAL EAR EXAM: ICD-10-CM

## 2020-02-13 DIAGNOSIS — H93.11 TINNITUS OF RIGHT EAR: ICD-10-CM

## 2020-02-13 DIAGNOSIS — R29.810 FACIAL MUSCLE WEAKNESS: ICD-10-CM

## 2020-02-13 DIAGNOSIS — G51.39 HEMIFACIAL SPASM: Primary | ICD-10-CM

## 2020-02-13 PROCEDURE — 3008F BODY MASS INDEX DOCD: CPT | Mod: CPTII,S$GLB,, | Performed by: OTOLARYNGOLOGY

## 2020-02-13 PROCEDURE — 99204 OFFICE O/P NEW MOD 45 MIN: CPT | Mod: S$GLB,,, | Performed by: OTOLARYNGOLOGY

## 2020-02-13 PROCEDURE — 3008F PR BODY MASS INDEX (BMI) DOCUMENTED: ICD-10-PCS | Mod: CPTII,S$GLB,, | Performed by: OTOLARYNGOLOGY

## 2020-02-13 PROCEDURE — 99204 PR OFFICE/OUTPT VISIT, NEW, LEVL IV, 45-59 MIN: ICD-10-PCS | Mod: S$GLB,,, | Performed by: OTOLARYNGOLOGY

## 2020-02-13 PROCEDURE — 92557 PR COMPREHENSIVE HEARING TEST: ICD-10-PCS | Mod: S$GLB,,, | Performed by: AUDIOLOGIST

## 2020-02-13 PROCEDURE — 92557 COMPREHENSIVE HEARING TEST: CPT | Mod: S$GLB,,, | Performed by: AUDIOLOGIST

## 2020-02-13 PROCEDURE — 92550 PR TYMPANOMETRY AND REFLEX THRESHOLD MEASUREMENTS: ICD-10-PCS | Mod: S$GLB,,, | Performed by: AUDIOLOGIST

## 2020-02-13 PROCEDURE — 92550 TYMPANOMETRY & REFLEX THRESH: CPT | Mod: S$GLB,,, | Performed by: AUDIOLOGIST

## 2020-02-13 NOTE — LETTER
February 20, 2020      Corin Kidd, DO  [address]  [address]  [place] [address]           [facility]  [address]  [place] [address]  Phone: [phone]          Patient: Magalis Rosales   MR Number: [id number]   YOB: 1970   Date of Visit: 2/13/2020       Dear Dr. Corin Kidd:    Thank you for referring Magalis Rosales to me for evaluation. Attached you will find relevant portions of my assessment and plan of care.    If you have questions, please do not hesitate to call me. I look forward to following Magalis Rosales along with you.    Sincerely,    Alexus Wells  CC:  No Recipients    If you would like to receive this communication electronically, please contact [email or web address] or [phone] to request more information on [employer] Link access.    For providers and/or their staff who would like to refer a patient to [facility], please contact us through our one-stop-shop provider referral line, [facility], at [phone].    If you feel you have received this communication in error or would no longer like to receive these types of communications, please e-mail [email or web address]

## 2020-02-13 NOTE — PROGRESS NOTES
Click on link to view Audiogram  Document on 2/13/2020  1:17 PM by Linda Sharma MA CCC-A: Audiogram    Magalis Rosales was seen today for an Audiologic evaluation for a pounding/fluttering sound in her right ear and right facial pain.    Tympanometry revealed a Type A tympanogram bilaterally.  The Audiogram revealed hearing within normal limits bilaterally.    Recommendations:  1. Otologic evaluation  2. Audiogram as needed

## 2020-02-13 NOTE — PROGRESS NOTES
"  Subjective:      Magalis Rosales is a 49 y.o. female who was referred to me by Dr. Corin Kidd in consultation for tinnitus with right sided pounding, fluttering, and throbbing in her ear. Frequent right sided facial twitching which are brief jerking movements only on the right side of her face. She also has history of recurrent throbbing headaches and history of migraine. These symptoms have been present for 8-9 months now and she has had multiple evaluations for this. She was seen in ED in September 2019 for these symptoms of right facial twitching and subjective difficulty speaking at which time an MRI/MRA was negative. Prior to then she had right side ear pounding but that had resolved with cerumen disimpaction previously. She is on topamax and sumatriptan for migraine which she feels help with her migraine headaches. She is unaware of any triggers for her facial twitiching and associated pulsatile tinnitus, nor any clear cause of onset of these symptoms.     She has no other associated otologic signs or symptoms including no ear drainage, significant ear pain other than intermittent throbbing sensation, and no significant change in hearing or loss of hearing. There is not a family history of hearing loss at a young age.  There is not a prior history of ear surgery.  There is not a prior significant history of ear infections.  She denies a history of significant noise exposure.  She does not wear hearing aids currently.  She has had a hearing test recently, performed and reviewed today. There is not a history of significant dizziness, specifically of vertigo symptoms clarified as a sensation of "the world spinning around her."     Overall QOL: 8/10    Past Medical History  She has a past medical history of History of psychiatric care, History of psychiatric hospitalization, Hypothyroidism, Migraines, Psychiatric exam requested by authority, Psychiatric problem, and Therapy.    Past Surgical History  She " "has a past surgical history that includes Appendectomy and  section.    Family History  Her family history includes Hypertension in her father and mother; Stroke in her father.    Social History  She reports that she has never smoked. She has never used smokeless tobacco. She reports that she does not drink alcohol or use drugs.    Allergies  She has No Known Allergies.    Medications  She has a current medication list which includes the following prescription(s): levothyroxine, magnesium oxide, sertraline, topiramate, clobetasol, gabapentin, sumatriptan, and triamcinolone acetonide 0.1%.    Review of Systems   Constitutional: Positive for fatigue.   HENT: Positive for ear pain and facial swelling. Negative for hoarse voice.    Eyes: Positive for itching.   Respiratory: Negative.    Cardiovascular: Negative.    Gastrointestinal: Negative.    Endocrine: Negative.    Genitourinary: Negative.    Musculoskeletal: Negative.    Skin: Negative.    Allergic/Immunologic: Negative.    Neurological: Positive for headaches.   Hematological: Negative.    Psychiatric/Behavioral: Negative.       Objective:     /70 (BP Location: Right arm, Patient Position: Sitting, BP Method: Small (Manual))   Ht 5' 3" (1.6 m)   Wt 75 kg (165 lb 3.8 oz)   BMI 29.27 kg/m²        Constitutional:   She is oriented to person, place, and time. Vital signs are normal. She appears well-developed and well-nourished. She appears alert. She does not appear ill. No distress. Normal speech.  No hoarse voice and breathy voice.      Head:  Normocephalic and atraumatic. No skin lesions. Facial strength is normal.      Ears:    Right Ear: No drainage, swelling or tenderness. Tympanic membrane is not injected, not scarred, not perforated, not retracted and not bulging. No middle ear effusion. No hemotympanum. No decreased hearing is noted.   Left Ear: No drainage, swelling or tenderness. Tympanic membrane is not injected, not scarred, not " perforated, not retracted and not bulging.  No middle ear effusion. No hemotympanum. No decreased hearing is noted.     Nose:  Nose normal including turbinates, nasal mucosa, sinuses and nasal septum.     Mouth/Throat  Oropharynx clear and moist without lesions or asymmetry.     Neck:  Trachea normal, phonation normal and full range of motion with neck supple. No stridor present.     Pulmonary/Chest:   Effort normal. No stridor.     Psychiatric:   She has a normal mood and affect. Her speech is normal and behavior is normal.     Neurological:   She is alert and oriented to person, place, and time. She has neurological normal, alert and oriented. No cranial nerve deficit or sensory deficit. Coordination and gait normal.     Skin:   No abrasions, lacerations, lesions, or rashes.     Procedure    None    Data Reviewed    WBC (K/uL)   Date Value   12/23/2019 10.30     Platelets (K/uL)   Date Value   12/23/2019 289      Creatinine (mg/dL)   Date Value   12/23/2019 1.1     TSH (uIU/mL)   Date Value   12/23/2019 7.647 (H)     Glucose (mg/dL)   Date Value   12/23/2019 93     Hemoglobin A1C (%)   Date Value   09/18/2019 5.0     I independently reviewed the tracings of the complete audiometric evaluation performed today.  I reviewed the audiogram with the patient as well.  Pertinent findings include a normal study: Tympanometry revealed a Type A tympanogram bilaterally. Audiogram revealed hearing within normal limits bilaterally.        Review of radiographic imaging:  I personally reviewed and interpreted imaging relative to the chief complaint. MRI was ordered at today's visit, performed on 2/24/2020, and subsequently reviewed and updated in note. I had ordered this imaging with intent specifically including consideration of vascular compression of right facial nerve with high resolution evaluation of course of facial nerve imaging requested given no obvious masses on brain MRI and symptoms of right facial nerve spasms.  This study demonstrated no abnormal findings along the course of the right facial nerve, nor of the inner ear, IAC, or CPA bilaterally.     Radiology read:  Unremarkable MRI of the IAC's and the cerebellopontine angles.  Normal course of the right facial nerve with no evidence of vascular compression or mass effect from the right AICA.    Minimal T2/FLAIR signal hyperintense within the subcortical white matter.  The findings may represent early chronic small vessel ischemic disease, migraines, or sequela of vasculitis.  No abnormal intracranial enhancement.       Assessment:     1. Hemifacial spasm    2. Facial muscle weakness    3. Tinnitus of right ear    4. Other migraine without status migrainosus, not intractable    5. Normal ear exam    6. Normal hearing exam         Plan:     I had a long discussion with the patient regarding her condition and the further workup and management options. Symptoms of otalgia and intermittent tinnitus are suspected to be related to hemifacial spasm. Audiogram was obtained today and demonstrated normal hearing as well as normal tympanometry bilaterally. There was no fluctuating volumes on tympanometry as can be seen with tensor spasm, though from discussion with audiologist performing the testing and the patient, the testing was likely performed during a period of no symptoms. I encouraged the patient that her ear exam and hearing test were normal and without any concerning findings.    Recommend evaluation by Neurology and consideration of options for additional evaluation and management of what is most consistent with hemifacial spasm, and without additional concerning findings on examination or imaging. She has already been referred to Dr. Bailey for consideration of botox for treatment of hemifacial spasm. She also has migraine headache for which I recommended ongoing management medically and with care from Neurology. I am not familiar with whether adequate treatment of  migraine could address an underlying shared physiologic process that would benefit her hemifacial spasm, but could be a consideration. I ordered an MRI to evaluate for any masses, neoplastic process such as facial nerve tumor, or vascular compression of the facial nerve. After this MRI was obtained the study was personally reviewed and results communicated to the patient. The results of this MRI are described above, added to this documentation after the study was performed. Return as needed for further ENT issues or any acute changes in hearing.      Ruben Watson MD    Rhinology, Allergy, and Sinus-Skull Base Surgery    Department of Otorhinolaryngology    [facility]    Phone  [phone]    Fax      [phone]

## 2020-02-24 ENCOUNTER — HOSPITAL ENCOUNTER (OUTPATIENT)
Dept: RADIOLOGY | Facility: HOSPITAL | Age: 50
Discharge: HOME | End: 2020-02-24
Attending: OTOLARYNGOLOGY
Payer: COMMERCIAL

## 2020-02-24 DIAGNOSIS — H93.11 TINNITUS OF RIGHT EAR: ICD-10-CM

## 2020-02-24 DIAGNOSIS — R29.810 FACIAL MUSCLE WEAKNESS: ICD-10-CM

## 2020-02-24 PROCEDURE — 25500020 PHARM REV CODE 255: Performed by: OTOLARYNGOLOGY

## 2020-02-24 PROCEDURE — 70553 MRI BRAIN W WO CONTRAST: ICD-10-PCS | Mod: 26,,, | Performed by: RADIOLOGY

## 2020-02-24 PROCEDURE — 70553 MRI BRAIN STEM W/O & W/DYE: CPT | Mod: 26,,, | Performed by: RADIOLOGY

## 2020-02-24 PROCEDURE — 70553 MRI BRAIN STEM W/O & W/DYE: CPT | Mod: TC

## 2020-02-24 PROCEDURE — A9585 GADOBUTROL INJECTION: HCPCS | Performed by: OTOLARYNGOLOGY

## 2020-02-24 RX ORDER — GADOBUTROL 604.72 MG/ML
8 INJECTION INTRAVENOUS
Status: COMPLETED | OUTPATIENT
Start: 2020-02-24 | End: 2020-02-24

## 2020-02-24 RX ADMIN — GADOBUTROL 8 ML: 604.72 INJECTION INTRAVENOUS at 02:02

## 2020-02-27 DIAGNOSIS — G43.829 MENSTRUAL MIGRAINE WITHOUT STATUS MIGRAINOSUS, NOT INTRACTABLE: ICD-10-CM

## 2020-02-27 RX ORDER — SUMATRIPTAN 50 MG/1
TABLET, FILM COATED ORAL
Qty: 21 TABLET | Refills: 1 | Status: SHIPPED | OUTPATIENT
Start: 2020-02-27 | End: 2020-07-27

## 2020-03-26 ENCOUNTER — TELEPHONE (OUTPATIENT)
Dept: FAMILY MEDICINE | Facility: CLINIC | Age: 50
End: 2020-03-26

## 2020-03-31 ENCOUNTER — PATIENT MESSAGE (OUTPATIENT)
Dept: FAMILY MEDICINE | Facility: CLINIC | Age: 50
End: 2020-03-31

## 2020-03-31 ENCOUNTER — TELEPHONE (OUTPATIENT)
Dept: FAMILY MEDICINE | Facility: CLINIC | Age: 50
End: 2020-03-31

## 2020-03-31 DIAGNOSIS — L30.9 ECZEMA, UNSPECIFIED TYPE: Primary | ICD-10-CM

## 2020-03-31 RX ORDER — TRIAMCINOLONE ACETONIDE 1 MG/G
OINTMENT TOPICAL 2 TIMES DAILY
Qty: 30 G | Refills: 0 | Status: SHIPPED | OUTPATIENT
Start: 2020-03-31 | End: 2020-04-27 | Stop reason: SDUPTHER

## 2020-03-31 NOTE — TELEPHONE ENCOUNTER
----- Message from Vane Adler sent at 3/31/2020 12:00 PM CDT -----  Contact: pt  Name of Who is Calling: pt    What is the request in detail: calling in regards to the message she sent earlier per her portal and verifying if Dr. Womack addressed her issue as of yet? Please contact to further discuss and advise      Can the clinic reply by [facility]: no    What Number to [facility] Back [facility]: [phone]

## 2020-03-31 NOTE — TELEPHONE ENCOUNTER
I spoke to the pt and she reports rash around her eye that is getting worse. Booked virtual visit for today.

## 2020-04-01 ENCOUNTER — OFFICE VISIT (OUTPATIENT)
Dept: FAMILY MEDICINE | Facility: CLINIC | Age: 50
End: 2020-04-01
Payer: COMMERCIAL

## 2020-04-01 ENCOUNTER — PATIENT MESSAGE (OUTPATIENT)
Dept: FAMILY MEDICINE | Facility: CLINIC | Age: 50
End: 2020-04-01

## 2020-04-01 DIAGNOSIS — L20.9 ATOPIC DERMATITIS, UNSPECIFIED TYPE: Primary | ICD-10-CM

## 2020-04-01 PROCEDURE — 99213 OFFICE O/P EST LOW 20 MIN: CPT | Mod: 95,,, | Performed by: PHYSICIAN ASSISTANT

## 2020-04-01 PROCEDURE — 99213 PR OFFICE/OUTPT VISIT, EST, LEVL III, 20-29 MIN: ICD-10-PCS | Mod: 95,,, | Performed by: PHYSICIAN ASSISTANT

## 2020-04-01 NOTE — PROGRESS NOTES
Patient Name: Magalis Rosales    : 1970  MRN: [id number]    The patient location is: Home    The chief complaint leading to consultation is: rash     Visit type: Virtual visit with synchronous audio and video  Total time spent with patient: 8 minutes  Each patient to whom he or she provides medical services by telemedicine is:  (1) informed of the relationship between the physician and patient and the respective role of any other health care provider with respect to management of the patient; and (2) notified that he or she may decline to receive medical services by telemedicine and may withdraw from such care at any time.      Subjective:  Magalis is a 49 y.o. female who presents today via virtual visit for:    Chief Complaint   Patient presents with    Rash       HPI  Patient has multiple medical diagnoses as listed below in the history. Patient is new to me, but known to the clinic. She complains of rash for 4-5 days. She reports having similar symptoms once before. Rash is bilateral periorbital and upper extremities. Rash is itchy and red. She denies known allergic contacts. No new soaps or detergents. She denies angioedema, dysphagia, dysphagia, SOB, n/v/d, or chest pain. She states that she just saw the return message from her PCP. He sent Kenalog topical cream for patient to use. She has not filled prescription or just topical therapy at this time.     Past Medical History  Past Medical History:   Diagnosis Date    History of psychiatric care     History of psychiatric hospitalization     Hypothyroidism     Migraines     Psychiatric exam requested by authority     Psychiatric problem     Therapy        Past Surgical History  Past Surgical History:   Procedure Laterality Date    APPENDECTOMY       SECTION         Family History  Family History   Problem Relation Age of Onset    Hypertension Mother     Hypertension Father     Stroke Father        Social History  Social History      Socioeconomic History    Marital status: Single     Spouse name: Not on file    Number of children: 1    Years of education: Not on file    Highest education level: Not on file   Occupational History    Occupation: Unemployed - substitute teaching   Social Needs    Financial resource strain: Not on file    Food insecurity:     Worry: Not on file     Inability: Not on file    Transportation needs:     Medical: Not on file     Non-medical: Not on file   Tobacco Use    Smoking status: Never Smoker    Smokeless tobacco: Never Used   Substance and Sexual Activity    Alcohol use: No    Drug use: No    Sexual activity: Not Currently     Partners: Male   Lifestyle    Physical activity:     Days per week: Not on file     Minutes per session: Not on file    Stress: Not on file   Relationships    Social connections:     Talks on phone: Not on file     Gets together: Not on file     Attends Sikhism service: Not on file     Active member of club or organization: Not on file     Attends meetings of clubs or organizations: Not on file     Relationship status: Not on file   Other Topics Concern    Patient feels they ought to cut down on drinking/drug use Not Asked    Patient annoyed by others criticizing their drinking/drug use Not Asked    Patient has felt bad or guilty about drinking/drug use Not Asked    Patient has had a drink/used drugs as an eye opener in the AM Not Asked   Social History Narrative    Not on file       Current Medications  Current Outpatient Medications on File Prior to Visit   Medication Sig Dispense Refill    gabapentin (NEURONTIN) 100 MG capsule Take 1 capsule (100 mg total) by mouth 3 (three) times daily. (Patient not taking: Reported on 2/13/2020) 90 capsule 11    levothyroxine (SYNTHROID) 200 MCG tablet Take 1 tablet (200 mcg total) by mouth once daily. 30 tablet 11    magnesium oxide (MAG-OX) 400 mg (241.3 mg magnesium) tablet Take 1 tablet (400 mg total) by mouth once  "daily. 90 tablet 3    sertraline (ZOLOFT) 100 MG tablet Take 1 tablet (100 mg total) by mouth once daily. 90 tablet 1    sumatriptan (IMITREX) 50 MG tablet TAKE 1 TABLET BY MOUTH AT ONSET OF HEADACHE, MAY REPEAT IN 2 HOURS. NOT MORE THAN 2 IN 24 HOURS 21 tablet 1    topiramate (TOPAMAX) 100 MG tablet Take 1 tablet (100 mg total) by mouth 2 (two) times daily. 60 tablet 3    triamcinolone acetonide 0.1% (KENALOG) 0.1 % ointment Apply topically 2 (two) times daily. 30 g 0     No current facility-administered medications on file prior to visit.        Allergies   Review of patient's allergies indicates:  No Known Allergies      ROS  Review of Systems   Constitutional: Negative for fatigue and fever.   Respiratory: Negative for cough and shortness of breath.    Cardiovascular: Negative for chest pain.   Gastrointestinal: Negative for nausea.   Musculoskeletal: Negative for arthralgias and myalgias.   Skin: Positive for rash.   Allergic/Immunologic: Negative for environmental allergies.   Neurological: Negative for weakness.         Objective:    There were no vitals taken for this visit.    Physical Exam   Constitutional: She does not appear ill.   HENT:   Head: Head is with right periorbital erythema and with left periorbital erythema.   Neurological: She is alert.   Skin: Rash noted.       Assessment/Plan:  Magalis Rosales is a 49 y.o. female who presents today for :    Diagnoses and all orders for this visit:    Atopic dermatitis, unspecified type  I agree with PCP recommendation of topical steroid cream. Continue with topical steroid cream as prescribed per PCP. Follow previous guidelines for use of steroid cream as instructed by PCP.   Follow up in 1 week if no improvement of symptoms. Patient gave verbal understanding and agreement of plan    As per PCP message with patient.     "I think a topical steroid ointment would be helpful for the itching. I would recommend using this twice per day to the area under your " "eye for the next three days. I would not recommend using longer than three days on the face as this can cause the skin to lighten and thin. I have sent this prescription to the [facility] in [place]. I would suggest starting just with the spot under the eyes. If it helps then you can try on the areas of the arms and legs (but can use for five consecutive days to the extremities as this skin in thicker and may require a longer treatment time to see full result)."       Encouraged to call/return to clinic if symptoms persist or worsen    Cheryle Eagle PA-C  [facility]/ [facility]      "

## 2020-04-01 NOTE — PATIENT INSTRUCTIONS
Managing Atopic Dermatitis (Eczema)     After bathing, gently pat your skin dry (dont rub). Apply moisturizer while your skin is still damp.   To manage your symptoms and help reduce the severity and frequency, try these self-care tips:  Caring for your skin  · Use a gentle, fragrance-free cleanser (or nonsoap cleanser) for bathing. Rinse well. Pat skin dry.  · Take warm, not hot, baths or showers. Try to limit them to no more that 10 to 15 minutes.   · Use moisturizer liberally right after you bathe, while your skin is still damp.  · Avoid scratching because it will cause more damage to your skin.   · Topical, over-the-counter hydrocortisone cream may help control mild symptoms.   Controlling your environment  · Avoid extreme heat or cold.  · Avoid very humid or very dry air.  · If your home or office air is very dry, use a humidifier.  · Avoid allergens, such as dust, that may be present in bedding, carpets, plush toys, or rugs.  · Know that pet hair and dander can cause flare-ups.  Seeking medical treatment  Another way to keep symptoms under control is to seek medical treatment. Talk with your healthcare provider about the type of treatment that may work best for you. Your provider may prescribe treatments such as the following:  · Topical treatments to put on the skin daily  · Medicines taken by mouth (oral medicines), such as antihistamines, antibiotics, or corticosteroids  · In severe cases shots (injections) may be needed to control the symptoms. You may even need antibiotics if skin infections occur.  Treatments dont work the same way for every person. So if your symptoms continue or get worse, ask your healthcare provider about other treatments.  Making lifestyle choices  · Manage the stress in your life.  · Wear loose-fitting cotton clothing that does not bind or rub your skin.  · Avoid contact with wool or other scratchy fabrics.  · Use fragrance-free products.  Getting good results  Now that you  know more about atopic dermatitis, the next step is up to you. Follow your healthcare providers treatment plan and your self-care routine. This will help bring atopic dermatitis under control. If your symptoms persist, be sure to let your health care provider know.   Date Last Reviewed: 2/1/2017  © 1610-8272 [employer]. [address] [place] [address], [place] [address]. All rights reserved. This information is not intended as a substitute for professional medical care. Always follow your healthcare professional's instructions.

## 2020-04-07 ENCOUNTER — TELEPHONE (OUTPATIENT)
Dept: FAMILY MEDICINE | Facility: CLINIC | Age: 50
End: 2020-04-07

## 2020-04-07 DIAGNOSIS — L20.84 INTRINSIC ECZEMA: Primary | ICD-10-CM

## 2020-04-07 RX ORDER — CLOBETASOL PROPIONATE 0.5 MG/G
AEROSOL, FOAM TOPICAL 2 TIMES DAILY
Qty: 50 G | Refills: 1 | Status: SHIPPED | OUTPATIENT
Start: 2020-04-07 | End: 2020-04-13

## 2020-04-07 NOTE — TELEPHONE ENCOUNTER
Follow up rash    Patient contacted and ID confirmed by name and   Reports complete resolution of red itching under bilateral eyes with triamcinolone ointment. No relief with upper ankles and bilateral arms with the same. No drainage. Recommend clobetasol steroid foam to extremities stressed this is not to be used for face. Needs repeat tsh will schedule at outpatient lab

## 2020-04-08 ENCOUNTER — PATIENT MESSAGE (OUTPATIENT)
Dept: FAMILY MEDICINE | Facility: CLINIC | Age: 50
End: 2020-04-08

## 2020-04-13 ENCOUNTER — TELEPHONE (OUTPATIENT)
Dept: FAMILY MEDICINE | Facility: CLINIC | Age: 50
End: 2020-04-13

## 2020-04-13 ENCOUNTER — LAB VISIT (OUTPATIENT)
Dept: LAB | Facility: HOSPITAL | Age: 50
End: 2020-04-13
Attending: INTERNAL MEDICINE
Payer: COMMERCIAL

## 2020-04-13 DIAGNOSIS — E03.9 HYPOTHYROIDISM, UNSPECIFIED TYPE: ICD-10-CM

## 2020-04-13 DIAGNOSIS — L23.9 ALLERGIC CONTACT DERMATITIS, UNSPECIFIED TRIGGER: Primary | ICD-10-CM

## 2020-04-13 DIAGNOSIS — G43.829 MENSTRUAL MIGRAINE WITHOUT STATUS MIGRAINOSUS, NOT INTRACTABLE: ICD-10-CM

## 2020-04-13 LAB — TSH SERPL DL<=0.005 MIU/L-ACNC: 1.73 UIU/ML (ref 0.4–4)

## 2020-04-13 PROCEDURE — 36415 COLL VENOUS BLD VENIPUNCTURE: CPT | Mod: PO

## 2020-04-13 PROCEDURE — 84443 ASSAY THYROID STIM HORMONE: CPT

## 2020-04-13 RX ORDER — FLUOCINONIDE 0.5 MG/G
OINTMENT TOPICAL 2 TIMES DAILY
Qty: 15 G | Refills: 0 | Status: SHIPPED | OUTPATIENT
Start: 2020-04-13 | End: 2020-04-27 | Stop reason: SDUPTHER

## 2020-04-13 RX ORDER — TOPIRAMATE 100 MG/1
TABLET, FILM COATED ORAL
Qty: 60 TABLET | Refills: 2 | Status: SHIPPED | OUTPATIENT
Start: 2020-04-13 | End: 2020-07-29

## 2020-04-13 NOTE — TELEPHONE ENCOUNTER
Clobetasol is not on formulary please change to Mometasone, Triamcinolone, Acetonide, Betamethasone or Fluocinolone

## 2020-04-17 ENCOUNTER — PATIENT MESSAGE (OUTPATIENT)
Dept: OBSTETRICS AND GYNECOLOGY | Facility: CLINIC | Age: 50
End: 2020-04-17

## 2020-04-27 DIAGNOSIS — L30.9 ECZEMA, UNSPECIFIED TYPE: ICD-10-CM

## 2020-04-27 DIAGNOSIS — L23.9 ALLERGIC CONTACT DERMATITIS, UNSPECIFIED TRIGGER: ICD-10-CM

## 2020-04-27 RX ORDER — FLUOCINONIDE 0.5 MG/G
OINTMENT TOPICAL 2 TIMES DAILY
Qty: 15 G | Refills: 0 | Status: SHIPPED | OUTPATIENT
Start: 2020-04-27 | End: 2020-06-30 | Stop reason: SDUPTHER

## 2020-04-27 RX ORDER — TRIAMCINOLONE ACETONIDE 1 MG/G
OINTMENT TOPICAL 2 TIMES DAILY
Qty: 30 G | Refills: 0 | Status: SHIPPED | OUTPATIENT
Start: 2020-04-27 | End: 2021-01-05 | Stop reason: SDUPTHER

## 2020-05-26 ENCOUNTER — PATIENT MESSAGE (OUTPATIENT)
Dept: OBSTETRICS AND GYNECOLOGY | Facility: CLINIC | Age: 50
End: 2020-05-26

## 2020-06-01 ENCOUNTER — OFFICE VISIT (OUTPATIENT)
Dept: OBSTETRICS AND GYNECOLOGY | Facility: CLINIC | Age: 50
End: 2020-06-01
Payer: COMMERCIAL

## 2020-06-01 ENCOUNTER — LAB VISIT (OUTPATIENT)
Dept: LAB | Facility: HOSPITAL | Age: 50
End: 2020-06-01
Attending: OBSTETRICS & GYNECOLOGY
Payer: COMMERCIAL

## 2020-06-01 VITALS
DIASTOLIC BLOOD PRESSURE: 80 MMHG | BODY MASS INDEX: 28.32 KG/M2 | SYSTOLIC BLOOD PRESSURE: 116 MMHG | WEIGHT: 159.81 LBS | HEIGHT: 63 IN

## 2020-06-01 DIAGNOSIS — D21.9 FIBROID: ICD-10-CM

## 2020-06-01 DIAGNOSIS — N92.3 IMB (INTERMENSTRUAL BLEEDING): ICD-10-CM

## 2020-06-01 DIAGNOSIS — Z01.411 ENCOUNTER FOR WELL WOMAN EXAM WITH ABNORMAL FINDINGS: Primary | ICD-10-CM

## 2020-06-01 DIAGNOSIS — Z01.411 ENCOUNTER FOR WELL WOMAN EXAM WITH ABNORMAL FINDINGS: ICD-10-CM

## 2020-06-01 DIAGNOSIS — Z12.4 ENCOUNTER FOR PAPANICOLAOU SMEAR FOR CERVICAL CANCER SCREENING: ICD-10-CM

## 2020-06-01 DIAGNOSIS — Z12.31 BREAST CANCER SCREENING BY MAMMOGRAM: ICD-10-CM

## 2020-06-01 LAB
BASOPHILS # BLD AUTO: 0.05 K/UL (ref 0–0.2)
BASOPHILS NFR BLD: 0.6 % (ref 0–1.9)
DIFFERENTIAL METHOD: ABNORMAL
EOSINOPHIL # BLD AUTO: 0.1 K/UL (ref 0–0.5)
EOSINOPHIL NFR BLD: 1 % (ref 0–8)
ERYTHROCYTE [DISTWIDTH] IN BLOOD BY AUTOMATED COUNT: 18.6 % (ref 11.5–14.5)
HCG INTACT+B SERPL-ACNC: <1.2 MIU/ML
HCT VFR BLD AUTO: 33.7 % (ref 37–48.5)
HGB BLD-MCNC: 10 G/DL (ref 12–16)
IMM GRANULOCYTES # BLD AUTO: 0.01 K/UL (ref 0–0.04)
IMM GRANULOCYTES NFR BLD AUTO: 0.1 % (ref 0–0.5)
LYMPHOCYTES # BLD AUTO: 2.2 K/UL (ref 1–4.8)
LYMPHOCYTES NFR BLD: 27 % (ref 18–48)
MCH RBC QN AUTO: 23 PG (ref 27–31)
MCHC RBC AUTO-ENTMCNC: 29.7 G/DL (ref 32–36)
MCV RBC AUTO: 78 FL (ref 82–98)
MONOCYTES # BLD AUTO: 0.5 K/UL (ref 0.3–1)
MONOCYTES NFR BLD: 6 % (ref 4–15)
NEUTROPHILS # BLD AUTO: 5.3 K/UL (ref 1.8–7.7)
NEUTROPHILS NFR BLD: 65.3 % (ref 38–73)
NRBC BLD-RTO: 0 /100 WBC
PLATELET # BLD AUTO: 265 K/UL (ref 150–350)
PMV BLD AUTO: 11.5 FL (ref 9.2–12.9)
RBC # BLD AUTO: 4.34 M/UL (ref 4–5.4)
WBC # BLD AUTO: 8.12 K/UL (ref 3.9–12.7)

## 2020-06-01 PROCEDURE — 83001 ASSAY OF GONADOTROPIN (FSH): CPT

## 2020-06-01 PROCEDURE — 99396 PR PREVENTIVE VISIT,EST,40-64: ICD-10-PCS | Mod: S$GLB,,, | Performed by: OBSTETRICS & GYNECOLOGY

## 2020-06-01 PROCEDURE — 87624 HPV HI-RISK TYP POOLED RSLT: CPT

## 2020-06-01 PROCEDURE — 83002 ASSAY OF GONADOTROPIN (LH): CPT

## 2020-06-01 PROCEDURE — 85025 COMPLETE CBC W/AUTO DIFF WBC: CPT

## 2020-06-01 PROCEDURE — 99999 PR PBB SHADOW E&M-EST. PATIENT-LVL IV: ICD-10-PCS | Mod: PBBFAC,,, | Performed by: OBSTETRICS & GYNECOLOGY

## 2020-06-01 PROCEDURE — 84146 ASSAY OF PROLACTIN: CPT

## 2020-06-01 PROCEDURE — 99999 PR PBB SHADOW E&M-EST. PATIENT-LVL IV: CPT | Mod: PBBFAC,,, | Performed by: OBSTETRICS & GYNECOLOGY

## 2020-06-01 PROCEDURE — 84702 CHORIONIC GONADOTROPIN TEST: CPT

## 2020-06-01 PROCEDURE — 99396 PREV VISIT EST AGE 40-64: CPT | Mod: S$GLB,,, | Performed by: OBSTETRICS & GYNECOLOGY

## 2020-06-01 PROCEDURE — 36415 COLL VENOUS BLD VENIPUNCTURE: CPT

## 2020-06-01 PROCEDURE — 88175 CYTOPATH C/V AUTO FLUID REDO: CPT

## 2020-06-01 RX ORDER — CHLORHEXIDINE GLUCONATE ORAL RINSE 1.2 MG/ML
SOLUTION DENTAL
COMMUNITY
Start: 2020-05-12

## 2020-06-01 RX ORDER — IBUPROFEN 800 MG/1
TABLET ORAL
Status: ON HOLD | COMMUNITY
Start: 2020-05-12 | End: 2020-06-23 | Stop reason: HOSPADM

## 2020-06-02 ENCOUNTER — TELEPHONE (OUTPATIENT)
Dept: OBSTETRICS AND GYNECOLOGY | Facility: CLINIC | Age: 50
End: 2020-06-02

## 2020-06-02 LAB
FSH SERPL-ACNC: 10 MIU/ML
LH SERPL-ACNC: 8 MIU/ML
PROLACTIN SERPL IA-MCNC: 14.4 NG/ML (ref 5.2–26.5)

## 2020-06-02 RX ORDER — DOCUSATE SODIUM 100 MG/1
100 CAPSULE, LIQUID FILLED ORAL 2 TIMES DAILY
Qty: 60 CAPSULE | Refills: 11 | Status: SHIPPED | OUTPATIENT
Start: 2020-06-02 | End: 2020-07-26 | Stop reason: SDUPTHER

## 2020-06-02 RX ORDER — FERROUS SULFATE 325(65) MG
325 TABLET ORAL
Qty: 30 TABLET | Refills: 11 | Status: SHIPPED | OUTPATIENT
Start: 2020-06-02 | End: 2020-08-01 | Stop reason: SDUPTHER

## 2020-06-02 NOTE — PROGRESS NOTES
Please inform pt she is slightly anemic, likely from her period.  I will start her on Fe/colace. rx sent

## 2020-06-04 ENCOUNTER — HOSPITAL ENCOUNTER (OUTPATIENT)
Dept: RADIOLOGY | Facility: HOSPITAL | Age: 50
Discharge: HOME | End: 2020-06-04
Attending: OBSTETRICS & GYNECOLOGY
Payer: COMMERCIAL

## 2020-06-04 VITALS — WEIGHT: 159 LBS | BODY MASS INDEX: 28.17 KG/M2 | HEIGHT: 63 IN

## 2020-06-04 DIAGNOSIS — D21.9 FIBROID: ICD-10-CM

## 2020-06-04 DIAGNOSIS — Z12.31 BREAST CANCER SCREENING BY MAMMOGRAM: ICD-10-CM

## 2020-06-04 DIAGNOSIS — Z01.411 ENCOUNTER FOR WELL WOMAN EXAM WITH ABNORMAL FINDINGS: ICD-10-CM

## 2020-06-04 DIAGNOSIS — N92.3 IMB (INTERMENSTRUAL BLEEDING): ICD-10-CM

## 2020-06-04 LAB
FINAL PATHOLOGIC DIAGNOSIS: NORMAL
Lab: NORMAL

## 2020-06-04 PROCEDURE — 76856 US PELVIS COMP WITH TRANSVAG NON-OB (XPD): ICD-10-PCS | Mod: 26,,, | Performed by: RADIOLOGY

## 2020-06-04 PROCEDURE — 76856 US EXAM PELVIC COMPLETE: CPT | Mod: 26,,, | Performed by: RADIOLOGY

## 2020-06-04 PROCEDURE — 77063 MAMMO DIGITAL SCREENING BILAT WITH TOMOSYNTHESIS_CAD: ICD-10-PCS | Mod: 26,,, | Performed by: RADIOLOGY

## 2020-06-04 PROCEDURE — 76830 TRANSVAGINAL US NON-OB: CPT | Mod: TC

## 2020-06-04 PROCEDURE — 77067 SCR MAMMO BI INCL CAD: CPT | Mod: TC

## 2020-06-04 PROCEDURE — 77067 MAMMO DIGITAL SCREENING BILAT WITH TOMOSYNTHESIS_CAD: ICD-10-PCS | Mod: 26,,, | Performed by: RADIOLOGY

## 2020-06-04 PROCEDURE — 77063 BREAST TOMOSYNTHESIS BI: CPT | Mod: 26,,, | Performed by: RADIOLOGY

## 2020-06-04 PROCEDURE — 76830 US PELVIS COMP WITH TRANSVAG NON-OB (XPD): ICD-10-PCS | Mod: 26,,, | Performed by: RADIOLOGY

## 2020-06-04 PROCEDURE — 77067 SCR MAMMO BI INCL CAD: CPT | Mod: 26,,, | Performed by: RADIOLOGY

## 2020-06-04 PROCEDURE — 76830 TRANSVAGINAL US NON-OB: CPT | Mod: 26,,, | Performed by: RADIOLOGY

## 2020-06-05 ENCOUNTER — TELEPHONE (OUTPATIENT)
Dept: OBSTETRICS AND GYNECOLOGY | Facility: CLINIC | Age: 50
End: 2020-06-05

## 2020-06-05 DIAGNOSIS — N92.3 IMB (INTERMENSTRUAL BLEEDING): Primary | ICD-10-CM

## 2020-06-05 NOTE — TELEPHONE ENCOUNTER
----- Message from Nesha Skaggs Mai, MD sent at 6/5/2020 12:10 PM CDT -----  Contact: Patient  Enlarged fibroid with multiple fibroids, largest about 4 cm.   As we discussed she will need a uterine sampling  Her h'scope D&C is scheduled for 6/23/20 @ 730.  There is very limited surgery slot, this is the best date that I can find    I will have Mala reach out to her for preop stuff    ----- Message -----  From: Allie Patten MA  Sent: 6/5/2020  11:35 AM CDT  To: Nesha Skaggs Mai, MD    Wanting U/S results doesn't look like you revived it yet please advise   ----- Message -----  From: Wilbert Garcia  Sent: 6/5/2020  11:29 AM CDT  To: Aminata Pendleton Staff    Type: Patient Call Back    Who called: Patient    What is the request in detail: Patient calling back for results.    Can the clinic reply by [facility]? No    Would the patient rather a call back or a response via [facility]? Call    Best call back number: [phone] (home)     Additional Information: n/a

## 2020-06-05 NOTE — TELEPHONE ENCOUNTER
----- Message from Lynda Helm sent at 6/5/2020 11:14 AM CDT -----  Contact: Self/  [phone]  Type: Patient Call Back    Who called:  Patient    What is the request in detail:   Patient would like staff to give her a call regarding the results from her US.  Thank you    Would the patient rather a call back or a response via [facility]?   Call back    Best call back number:    [phone]

## 2020-06-05 NOTE — PROGRESS NOTES
Enlarged fibroid with multiple fibroids, largest about 4 cm.   As we discussed she will need a uterine sampling  Her h'scope D&C is scheduled for 6/23/20 @ 350.  There is very limited surgery slot, this is the best date that I can find

## 2020-06-06 LAB
HPV HR 12 DNA SPEC QL NAA+PROBE: POSITIVE
HPV16 AG SPEC QL: NEGATIVE
HPV18 DNA SPEC QL NAA+PROBE: NEGATIVE

## 2020-06-08 ENCOUNTER — TELEPHONE (OUTPATIENT)
Dept: OBSTETRICS AND GYNECOLOGY | Facility: CLINIC | Age: 50
End: 2020-06-08

## 2020-06-08 NOTE — PROGRESS NOTES
Please inform pt her pap smear did not show any pre-cancerous cell but she has HPV, this is expected as she has had abnormal pap smear in the past.  No treatment or biopsy needed at this time.   Recommend repeating pap smear in one year for closer observation

## 2020-06-08 NOTE — TELEPHONE ENCOUNTER
Spoke with pt clarified she is still having surgery     ----- Message from Lynda Helm sent at 6/8/2020  1:48 PM CDT -----  Contact: Self/  [phone]  Type: Patient Call Back    Who called:  Patient    What is the request in detail:  Patient would like staff to give her a call, she's wanting to speak with someone about what's going on.  Thank you    Would the patient rather a call back or a response via [facility]?   Call back    Best call back number:  [phone]

## 2020-06-15 ENCOUNTER — OFFICE VISIT (OUTPATIENT)
Dept: OBSTETRICS AND GYNECOLOGY | Facility: CLINIC | Age: 50
End: 2020-06-15
Payer: COMMERCIAL

## 2020-06-15 ENCOUNTER — HOSPITAL ENCOUNTER (OUTPATIENT)
Dept: PREADMISSION TESTING | Facility: HOSPITAL | Age: 50
Discharge: HOME | End: 2020-06-15
Attending: OBSTETRICS & GYNECOLOGY
Payer: COMMERCIAL

## 2020-06-15 VITALS
OXYGEN SATURATION: 100 % | RESPIRATION RATE: 16 BRPM | DIASTOLIC BLOOD PRESSURE: 82 MMHG | BODY MASS INDEX: 28.71 KG/M2 | HEART RATE: 56 BPM | SYSTOLIC BLOOD PRESSURE: 130 MMHG | TEMPERATURE: 98 F | HEIGHT: 63 IN | WEIGHT: 162.06 LBS

## 2020-06-15 VITALS
SYSTOLIC BLOOD PRESSURE: 130 MMHG | WEIGHT: 162.25 LBS | DIASTOLIC BLOOD PRESSURE: 84 MMHG | BODY MASS INDEX: 28.74 KG/M2

## 2020-06-15 DIAGNOSIS — D25.9 UTERINE LEIOMYOMA, UNSPECIFIED LOCATION: ICD-10-CM

## 2020-06-15 DIAGNOSIS — Z01.818 PREOP EXAMINATION: Primary | ICD-10-CM

## 2020-06-15 DIAGNOSIS — Z01.818 PRE-OP TESTING: Primary | ICD-10-CM

## 2020-06-15 DIAGNOSIS — Z01.818 PREOP EXAMINATION: ICD-10-CM

## 2020-06-15 LAB
ALBUMIN SERPL BCP-MCNC: 3.8 G/DL (ref 3.5–5.2)
ALP SERPL-CCNC: 82 U/L (ref 55–135)
ALT SERPL W/O P-5'-P-CCNC: 59 U/L (ref 10–44)
ANION GAP SERPL CALC-SCNC: 8 MMOL/L (ref 8–16)
AST SERPL-CCNC: 57 U/L (ref 10–40)
BASOPHILS # BLD AUTO: 0.05 K/UL (ref 0–0.2)
BASOPHILS NFR BLD: 0.5 % (ref 0–1.9)
BILIRUB SERPL-MCNC: 0.3 MG/DL (ref 0.1–1)
BUN SERPL-MCNC: 16 MG/DL (ref 6–20)
CALCIUM SERPL-MCNC: 8.8 MG/DL (ref 8.7–10.5)
CHLORIDE SERPL-SCNC: 112 MMOL/L (ref 95–110)
CO2 SERPL-SCNC: 21 MMOL/L (ref 23–29)
CREAT SERPL-MCNC: 0.9 MG/DL (ref 0.5–1.4)
DIFFERENTIAL METHOD: ABNORMAL
EOSINOPHIL # BLD AUTO: 0.1 K/UL (ref 0–0.5)
EOSINOPHIL NFR BLD: 0.9 % (ref 0–8)
ERYTHROCYTE [DISTWIDTH] IN BLOOD BY AUTOMATED COUNT: 20.9 % (ref 11.5–14.5)
EST. GFR  (AFRICAN AMERICAN): >60 ML/MIN/1.73 M^2
EST. GFR  (NON AFRICAN AMERICAN): >60 ML/MIN/1.73 M^2
GLUCOSE SERPL-MCNC: 76 MG/DL (ref 70–110)
HCG INTACT+B SERPL-ACNC: <1.2 MIU/ML
HCT VFR BLD AUTO: 35.6 % (ref 37–48.5)
HGB BLD-MCNC: 10.7 G/DL (ref 12–16)
IMM GRANULOCYTES # BLD AUTO: 0.02 K/UL (ref 0–0.04)
IMM GRANULOCYTES NFR BLD AUTO: 0.2 % (ref 0–0.5)
LYMPHOCYTES # BLD AUTO: 2.3 K/UL (ref 1–4.8)
LYMPHOCYTES NFR BLD: 23.4 % (ref 18–48)
MCH RBC QN AUTO: 23.8 PG (ref 27–31)
MCHC RBC AUTO-ENTMCNC: 30.1 G/DL (ref 32–36)
MCV RBC AUTO: 79 FL (ref 82–98)
MONOCYTES # BLD AUTO: 0.5 K/UL (ref 0.3–1)
MONOCYTES NFR BLD: 5.4 % (ref 4–15)
NEUTROPHILS # BLD AUTO: 6.7 K/UL (ref 1.8–7.7)
NEUTROPHILS NFR BLD: 69.6 % (ref 38–73)
NRBC BLD-RTO: 0 /100 WBC
PLATELET # BLD AUTO: 246 K/UL (ref 150–350)
PMV BLD AUTO: ABNORMAL FL (ref 9.2–12.9)
POTASSIUM SERPL-SCNC: 4.3 MMOL/L (ref 3.5–5.1)
PROT SERPL-MCNC: 7.9 G/DL (ref 6–8.4)
RBC # BLD AUTO: 4.5 M/UL (ref 4–5.4)
SODIUM SERPL-SCNC: 141 MMOL/L (ref 136–145)
WBC # BLD AUTO: 9.6 K/UL (ref 3.9–12.7)

## 2020-06-15 PROCEDURE — 80053 COMPREHEN METABOLIC PANEL: CPT

## 2020-06-15 PROCEDURE — 84702 CHORIONIC GONADOTROPIN TEST: CPT

## 2020-06-15 PROCEDURE — 99999 PR PBB SHADOW E&M-EST. PATIENT-LVL III: ICD-10-PCS | Mod: PBBFAC,,, | Performed by: OBSTETRICS & GYNECOLOGY

## 2020-06-15 PROCEDURE — 99499 UNLISTED E&M SERVICE: CPT | Mod: S$GLB,,, | Performed by: OBSTETRICS & GYNECOLOGY

## 2020-06-15 PROCEDURE — 85025 COMPLETE CBC W/AUTO DIFF WBC: CPT

## 2020-06-15 PROCEDURE — 99499 NO LOS: ICD-10-PCS | Mod: S$GLB,,, | Performed by: OBSTETRICS & GYNECOLOGY

## 2020-06-15 PROCEDURE — 99999 PR PBB SHADOW E&M-EST. PATIENT-LVL III: CPT | Mod: PBBFAC,,, | Performed by: OBSTETRICS & GYNECOLOGY

## 2020-06-15 PROCEDURE — 36415 COLL VENOUS BLD VENIPUNCTURE: CPT

## 2020-06-15 RX ORDER — MUPIROCIN 20 MG/G
OINTMENT TOPICAL
Status: CANCELLED | OUTPATIENT
Start: 2020-06-15

## 2020-06-15 RX ORDER — IBUPROFEN 600 MG/1
600 TABLET ORAL EVERY 6 HOURS PRN
Qty: 30 TABLET | Refills: 0 | Status: SHIPPED | OUTPATIENT
Start: 2020-06-15 | End: 2021-01-25 | Stop reason: CLARIF

## 2020-06-15 RX ORDER — SODIUM CHLORIDE 9 MG/ML
INJECTION, SOLUTION INTRAVENOUS CONTINUOUS
Status: CANCELLED | OUTPATIENT
Start: 2020-06-15

## 2020-06-15 RX ORDER — HYDROCODONE BITARTRATE AND ACETAMINOPHEN 5; 325 MG/1; MG/1
1 TABLET ORAL EVERY 6 HOURS PRN
Qty: 10 TABLET | Refills: 0 | Status: ON HOLD | OUTPATIENT
Start: 2020-06-15 | End: 2021-01-26

## 2020-06-15 RX ORDER — MISOPROSTOL 200 UG/1
TABLET ORAL
Qty: 1 TABLET | Refills: 0 | Status: ON HOLD | OUTPATIENT
Start: 2020-06-15 | End: 2020-06-23 | Stop reason: HOSPADM

## 2020-06-15 NOTE — DISCHARGE INSTRUCTIONS
Your surgery is scheduled for _Tuesday June 23, 2020__.    Call [phone] between 2 p.m. and 5 p.m. on   __Monday __ to find out your arrival time for the day of your surgery.                        Emergency Room  Please report to SAME DAY SURGERY UNIT on the 2nd FLOOR at _______ a.m.        INSTRUCTIONS IMPORTANT!!!  ¨ Do not eat or drink after 12 midnight-including water. OK to brush teeth, no   gum, candy or mints!      _x___  Return to Hospital Lab on _Monday June 22, 2020 at 7:30 AM__for additional blood test. GO TO PATIENT REGISTRATION    _x___  Prep instructions:   SHOWER     _x___  Please shower using Hibiclens soap the night before AND  the morning of your surgery/procedure. Do not use Hibiclens on your face or genitals       x        If your surgery is around your belly button (Navel) be sure to wash inside your belly button also. Rinse hibiclens off completely.  _x___  No shaving of procedural area at least 4-5 days before surgery due to  increased risk of skin irritation and/or possible infection.  _x___  Do not wear makeup, including mascara. WEARING EYE MAKEUP MAY LEAD TO SERIOUS EYE INJURY during surgery.  _x___  No powder, lotions or creams to your body.  _x___  You may wear only deodorant on the day of surgery.  _x___  Please remove all jewelry, including piercings and leave at home.  _x___  No money or valuables needed. Please leave at home.  You may bring your cell phone.  ____  Please bring any documents given by your doctor.  _x___  If going home the same day, arrange for a ride home. You will not be able to   drive if Anesthesia was used.  _x___  Wear loose fitting clothing. Allow for dressings, bandages.  _x___  Stop Aspirin, Ibuprofen, Motrin and Aleve at least 3-5 days before  surgery, unless otherwise instructed by your doctor, or the nurse.      x        You MAY use Tylenol/acetaminophen until day of surgery.    _x___  Call MD for temperature above 101 degrees.        _x___ Stop taking  any Fish Oil supplement or any Vitamins that contain Vitamin E at least 5 days prior to surgery.              I have read or had read and explained to me, and understand the above information.  Additional comments or instructions:Please call   [phone] if you have any questions regarding the instructions above.

## 2020-06-15 NOTE — PROGRESS NOTES
Magalis Rosales is a 49 y.o. female   for annual well woman exam. Patient's last menstrual period was 2020 (exact date)..       She complains of IMB x 4-5 months.  Pt with known h/o uterine fibroids.  Never had problem before  Menses still regular and lasting 4-5 days,  Not heavy but now experienced IMB that lasted for 1 week for the past 4-5 months  Pt reported cramping during IMB but denies pain with menstrual cycles  She is not currently sexually active.    Pt denies vasomotor symptoms  Pt declined any medical or surgical intervention for fibroid at this point  She elected to preserve her uterus as possible  Still considering fertility in the future   stated she has her daughter with the fibroids without complications    EXAMINATION:  US PELVIS COMP WITH TRANSVAG NON-OB (XPD)     CLINICAL HISTORY:  IMB, fibroids;  Encounter for gynecological examination (general) (routine) with abnormal findings     TECHNIQUE:  Transabdominal and transvaginal pelvic ultrasound.     COMPARISON:  None     FINDINGS:  Uterus: Measures 11.0 x 7.5 x 9.2 cm.  Endometrial echo complex measures 6 mm, unremarkable.  There are numerous uterine fibroids.  Anterior intramural myoma measures 3.1 cm.  Lower uterine segment myoma measures 3.8 cm.     Right ovary: Not visualized.     Left ovary: Measures 3.1 x 2.7 x 1.6 cm.  Appearance is unremarkable.  Blood flow is present.     Miscellaneous: No free fluid.     Impression:     Leiomyomatous uterus.        Electronically signed by: Renny Xiao MD  Date:                                            2020  Time:                                           15:01          Past Medical History:   Diagnosis Date    History of psychiatric care      History of psychiatric hospitalization      Hypothyroidism      Migraines      Psychiatric exam requested by authority      Psychiatric problem      Therapy             Past Surgical History:   Procedure Laterality Date     APPENDECTOMY         SECTION         Social History               Socioeconomic History    Marital status: Single       Spouse name: Not on file    Number of children: 1    Years of education: Not on file    Highest education level: Not on file   Occupational History    Occupation: Unemployed - substitute teaching   Social Needs    Financial resource strain: Not on file    Food insecurity:       Worry: Not on file       Inability: Not on file    Transportation needs:       Medical: Not on file       Non-medical: Not on file   Tobacco Use    Smoking status: Never Smoker    Smokeless tobacco: Never Used   Substance and Sexual Activity    Alcohol use: No    Drug use: No    Sexual activity: Not Currently       Partners: Male   Lifestyle    Physical activity:       Days per week: Not on file       Minutes per session: Not on file    Stress: Not on file   Relationships    Social connections:       Talks on phone: Not on file       Gets together: Not on file       Attends Christian service: Not on file       Active member of club or organization: Not on file       Attends meetings of clubs or organizations: Not on file       Relationship status: Not on file   Other Topics Concern    Patient feels they ought to cut down on drinking/drug use Not Asked    Patient annoyed by others criticizing their drinking/drug use Not Asked    Patient has felt bad or guilty about drinking/drug use Not Asked    Patient has had a drink/used drugs as an eye opener in the AM Not Asked   Social History Narrative    Not on file              Family History   Problem Relation Age of Onset    Hypertension Mother      Hypertension Father      Stroke Father                        OB History    Para Term  AB Living   1 1 1     1   SAB TAB Ectopic Multiple Live Births              1          # Outcome Date GA Lbr Ryley/2nd Weight Sex Delivery Anes PTL Lv   1 Term                         Obstetric Comments    Gynhx;  Reg   Not currently sexually active   Uterine fibroid x years - no symptoms   H/o abnormal pap, s/p laser in 2000, normal pap since then   MMG 2018 NEG           Current Medications          Current Outpatient Medications   Medication Sig Dispense Refill    chlorhexidine (PERIDEX) 0.12 % solution          fluocinonide (LIDEX) 0.05 % ointment Apply topically 2 (two) times daily. 15 g 0    gabapentin (NEURONTIN) 100 MG capsule Take 1 capsule (100 mg total) by mouth 3 (three) times daily. 90 capsule 11     mg tablet          levothyroxine (SYNTHROID) 200 MCG tablet Take 1 tablet (200 mcg total) by mouth once daily. 30 tablet 11    magnesium oxide (MAG-OX) 400 mg (241.3 mg magnesium) tablet Take 1 tablet (400 mg total) by mouth once daily. 90 tablet 3    sertraline (ZOLOFT) 100 MG tablet Take 1 tablet (100 mg total) by mouth once daily. 90 tablet 1    sumatriptan (IMITREX) 50 MG tablet TAKE 1 TABLET BY MOUTH AT ONSET OF HEADACHE, MAY REPEAT IN 2 HOURS. NOT MORE THAN 2 IN 24 HOURS 21 tablet 1    topiramate (TOPAMAX) 100 MG tablet TAKE ONE TABLET BY MOUTH TWICE DAILY 60 tablet 2    triamcinolone acetonide 0.1% (KENALOG) 0.1 % ointment Apply topically 2 (two) times daily. 30 g 0      No current facility-administered medications for this visit.         Allergies: Patient has no known allergies.         ROS:  GENERAL: Denies weight gain or weight loss. Feeling well overall.   SKIN: Denies rash or lesions.   HEAD: Denies head injury or headache.   NODES: Denies enlarged lymph nodes.   CHEST: Denies chest pain or shortness of breath.   CARDIOVASCULAR: Denies palpitations or left sided chest pain.   ABDOMEN: No abdominal pain, constipation, diarrhea, nausea, vomiting or rectal bleeding.   URINARY: No frequency, dysuria, hematuria, or burning on urination.  REPRODUCTIVE: Denies vaginal discharge, abnormal vaginal bleeding, lesions, pelvic pain  BREASTS: The patient performs breast self-examination and  "denies pain, lumps, or nipple discharge.   HEMATOLOGIC: No easy bruisability or excessive bleeding.  MUSCULOSKELETAL: Denies joint pain or swelling.   NEUROLOGIC: Denies syncope or weakness.   PSYCHIATRIC: Denies depression, anxiety or mood swings.        OBJECTIVE:   /80 (BP Location: Left arm, Patient Position: Sitting, BP Method: Medium (Manual))   Ht 5' 3" (1.6 m)   Wt 72.5 kg (159 lb 13.3 oz)   LMP 04/30/2020 (Exact Date)   BMI 28.31 kg/m²   The patient appears well, alert, oriented x 3, in no distress.  PSYCH:  Normal, full range of affect  NECK: negative, no thyromegaly, trachea midline  SKIN: normal, good color, good turgor and no acne, striae, hirsutism  BREAST EXAM: breasts appear normal, no suspicious masses, no skin or nipple changes or axillary nodes  ABDOMEN: soft, non-tender; bowel sounds normal; no masses,  no organomegaly and no hernias, masses, or hepatosplenomegaly  GYN: deferred       ASSESSMENT:  1.  IMB:  Need endometrial sampling,  However I do not think she is a candidate for in office EMB given her cervical stenosis and cervix displaced due to fibroids, will proceed with h'scope D&C on 6/23/20.  preop orders placed.   Post op med sent.  Including cytotec         "

## 2020-06-22 ENCOUNTER — HOSPITAL ENCOUNTER (OUTPATIENT)
Dept: PREADMISSION TESTING | Facility: HOSPITAL | Age: 50
Discharge: HOME | End: 2020-06-22
Attending: OBSTETRICS & GYNECOLOGY
Payer: COMMERCIAL

## 2020-06-22 DIAGNOSIS — Z01.818 PREOP EXAMINATION: ICD-10-CM

## 2020-06-22 DIAGNOSIS — D25.9 UTERINE LEIOMYOMA, UNSPECIFIED LOCATION: ICD-10-CM

## 2020-06-22 DIAGNOSIS — Z01.818 PRE-OP TESTING: ICD-10-CM

## 2020-06-22 LAB
ABO + RH BLD: NORMAL
B-HCG UR QL: NEGATIVE
BLD GP AB SCN CELLS X3 SERPL QL: NORMAL
SARS-COV-2 RDRP RESP QL NAA+PROBE: NEGATIVE

## 2020-06-22 PROCEDURE — U0002 COVID-19 LAB TEST NON-CDC: HCPCS

## 2020-06-22 PROCEDURE — 36415 COLL VENOUS BLD VENIPUNCTURE: CPT

## 2020-06-22 PROCEDURE — 81025 URINE PREGNANCY TEST: CPT

## 2020-06-22 PROCEDURE — 86901 BLOOD TYPING SEROLOGIC RH(D): CPT

## 2020-06-22 NOTE — H&P
Magalis Rosales is a 49 y.o. female  with uterine fibroid and IMB.   Patient's last menstrual period was 2020 (exact date)..       She complains of IMB x 4-5 months.  Pt with known h/o uterine fibroids.  Never had problem before  Menses still regular and lasting 4-5 days,  Not heavy but now experienced IMB that lasted for 1 week for the past 4-5 months  Pt reported cramping during IMB but denies pain with menstrual cycles  She is not currently sexually active.    Pt denies vasomotor symptoms  Pt declined any medical or surgical intervention for fibroid at this point  She elected to preserve her uterus as possible  Still considering fertility in the future   stated she has her daughter with the fibroids without complications     EXAMINATION:  US PELVIS COMP WITH TRANSVAG NON-OB (XPD)     CLINICAL HISTORY:  IMB, fibroids;  Encounter for gynecological examination (general) (routine) with abnormal findings     TECHNIQUE:  Transabdominal and transvaginal pelvic ultrasound.     COMPARISON:  None     FINDINGS:  Uterus: Measures 11.0 x 7.5 x 9.2 cm.  Endometrial echo complex measures 6 mm, unremarkable.  There are numerous uterine fibroids.  Anterior intramural myoma measures 3.1 cm.  Lower uterine segment myoma measures 3.8 cm.     Right ovary: Not visualized.     Left ovary: Measures 3.1 x 2.7 x 1.6 cm.  Appearance is unremarkable.  Blood flow is present.     Miscellaneous: No free fluid.     Impression:     Leiomyomatous uterus.        Electronically signed by: Renny Xiao MD  Date:                                            2020  Time:                                           15:01             Past Medical History:   Diagnosis Date    History of psychiatric care      History of psychiatric hospitalization      Hypothyroidism      Migraines      Psychiatric exam requested by [facility]      Psychiatric problem      Therapy                  Past Surgical History:   Procedure Laterality Date     APPENDECTOMY         SECTION          Social History                   Socioeconomic History    Marital status: Single       Spouse name: Not on file    Number of children: 1    Years of education: Not on file    Highest education level: Not on file   Occupational History    Occupation: Unemployed - substitute teaching   Social Needs    Financial resource strain: Not on file    Food insecurity:       Worry: Not on file       Inability: Not on file    Transportation needs:       Medical: Not on file       Non-medical: Not on file   Tobacco Use    Smoking status: Never Smoker    Smokeless tobacco: Never Used   Substance and Sexual Activity    Alcohol use: No    Drug use: No    Sexual activity: Not Currently       Partners: Male   Lifestyle    Physical activity:       Days per week: Not on file       Minutes per session: Not on file    Stress: Not on file   Relationships    Social connections:       Talks on phone: Not on file       Gets together: Not on file       Attends Congregational service: Not on file       Active member of club or organization: Not on file       Attends meetings of clubs or organizations: Not on file       Relationship status: Not on file   Other Topics Concern    Patient feels they ought to cut down on drinking/drug use Not Asked    Patient annoyed by others criticizing their drinking/drug use Not Asked    Patient has felt bad or guilty about drinking/drug use Not Asked    Patient has had a drink/used drugs as an eye opener in the AM Not Asked   Social History Narrative    Not on file                   Family History   Problem Relation Age of Onset    Hypertension Mother      Hypertension Father      Stroke Father                                        OB History    Para Term  AB Living   1 1 1     1   SAB TAB Ectopic Multiple Live Births                 1             # Outcome Date GA Lbr Ryley/2nd Weight Sex Delivery Anes PTL Lv   1 Term                          Obstetric Comments   Gynhx;  Reg   Not currently sexually active   Uterine fibroid x years - no symptoms   H/o abnormal pap, s/p laser in 2000, normal pap since then   MMG 2018 NEG            Current Medications               Current Outpatient Medications   Medication Sig Dispense Refill    chlorhexidine (PERIDEX) 0.12 % solution          fluocinonide (LIDEX) 0.05 % ointment Apply topically 2 (two) times daily. 15 g 0    gabapentin (NEURONTIN) 100 MG capsule Take 1 capsule (100 mg total) by mouth 3 (three) times daily. 90 capsule 11     mg tablet          levothyroxine (SYNTHROID) 200 MCG tablet Take 1 tablet (200 mcg total) by mouth once daily. 30 tablet 11    magnesium oxide (MAG-OX) 400 mg (241.3 mg magnesium) tablet Take 1 tablet (400 mg total) by mouth once daily. 90 tablet 3    sertraline (ZOLOFT) 100 MG tablet Take 1 tablet (100 mg total) by mouth once daily. 90 tablet 1    sumatriptan (IMITREX) 50 MG tablet TAKE 1 TABLET BY MOUTH AT ONSET OF HEADACHE, MAY REPEAT IN 2 HOURS. NOT MORE THAN 2 IN 24 HOURS 21 tablet 1    topiramate (TOPAMAX) 100 MG tablet TAKE ONE TABLET BY MOUTH TWICE DAILY 60 tablet 2    triamcinolone acetonide 0.1% (KENALOG) 0.1 % ointment Apply topically 2 (two) times daily. 30 g 0      No current facility-administered medications for this visit.          Allergies: Patient has no known allergies.         ROS:  GENERAL: Denies weight gain or weight loss. Feeling well overall.   SKIN: Denies rash or lesions.   HEAD: Denies head injury or headache.   NODES: Denies enlarged lymph nodes.   CHEST: Denies chest pain or shortness of breath.   CARDIOVASCULAR: Denies palpitations or left sided chest pain.   ABDOMEN: No abdominal pain, constipation, diarrhea, nausea, vomiting or rectal bleeding.   URINARY: No frequency, dysuria, hematuria, or burning on urination.  REPRODUCTIVE: Denies vaginal discharge, abnormal vaginal bleeding, lesions, pelvic pain  BREASTS: The patient  "performs breast self-examination and denies pain, lumps, or nipple discharge.   HEMATOLOGIC: No easy bruisability or excessive bleeding.  MUSCULOSKELETAL: Denies joint pain or swelling.   NEUROLOGIC: Denies syncope or weakness.   PSYCHIATRIC: Denies depression, anxiety or mood swings.        OBJECTIVE:   /80 (BP Location: Left arm, Patient Position: Sitting, BP Method: Medium (Manual))   Ht 5' 3" (1.6 m)   Wt 72.5 kg (159 lb 13.3 oz)   LMP 04/30/2020 (Exact Date)   BMI 28.31 kg/m²   The patient appears well, alert, oriented x 3, in no distress.  PSYCH:  Normal, full range of affect  NECK: negative, no thyromegaly, trachea midline  SKIN: normal, good color, good turgor and no acne, striae, hirsutism  HEART:  RRR  LUNGS:  CTAB  BREAST EXAM: breasts appear normal, no suspicious masses, no skin or nipple changes or axillary nodes  ABDOMEN: soft, non-tender; bowel sounds normal; no masses,  no organomegaly and no hernias, masses, or hepatosplenomegaly  GENITALIA: normal external genitalia, no erythema, no discharge  BLADDER: soft  URETHRA: normal appearing urethra with no masses, tenderness or lesions and normal urethra, normal urethral meatus  VAGINA: Normal  CERVIX: no lesions or cervical motion tenderness and cervical canal is pinpoint, not able to pass a pap smear brush.  Cervix being displaced posteriorly and to left side  UTERUS: enlarged and bulky.  12-14 weeks size  ADNEXA: no mass, fullness, tenderness        ASSESSMENT:  1.  IMB:  Need endometrial sampling,  However I do not think she is a candidate for in office EMB given her cervical stenosis and cervix displaced due to fibroids, will proceed with h'scope D&C on 6/23/20.  Consent signed       "

## 2020-06-23 ENCOUNTER — ANESTHESIA (OUTPATIENT)
Dept: SURGERY | Facility: HOSPITAL | Age: 50
End: 2020-06-23
Payer: COMMERCIAL

## 2020-06-23 ENCOUNTER — ANESTHESIA EVENT (OUTPATIENT)
Dept: SURGERY | Facility: HOSPITAL | Age: 50
End: 2020-06-23
Payer: COMMERCIAL

## 2020-06-23 ENCOUNTER — HOSPITAL ENCOUNTER (OUTPATIENT)
Facility: HOSPITAL | Age: 50
Discharge: HOME | End: 2020-06-23
Attending: OBSTETRICS & GYNECOLOGY | Admitting: OBSTETRICS & GYNECOLOGY
Payer: COMMERCIAL

## 2020-06-23 VITALS
BODY MASS INDEX: 28.7 KG/M2 | DIASTOLIC BLOOD PRESSURE: 62 MMHG | WEIGHT: 162.06 LBS | SYSTOLIC BLOOD PRESSURE: 108 MMHG | TEMPERATURE: 98 F | RESPIRATION RATE: 15 BRPM | HEART RATE: 61 BPM | OXYGEN SATURATION: 100 %

## 2020-06-23 DIAGNOSIS — N92.3 IMB (INTERMENSTRUAL BLEEDING): ICD-10-CM

## 2020-06-23 DIAGNOSIS — Z01.818 PRE-OP TESTING: Primary | ICD-10-CM

## 2020-06-23 DIAGNOSIS — D25.9 UTERINE LEIOMYOMA, UNSPECIFIED LOCATION: ICD-10-CM

## 2020-06-23 DIAGNOSIS — Z01.818 PREOP EXAMINATION: ICD-10-CM

## 2020-06-23 PROCEDURE — 88305 TISSUE EXAM BY PATHOLOGIST: ICD-10-PCS | Mod: 26,,, | Performed by: PATHOLOGY

## 2020-06-23 PROCEDURE — 71000033 HC RECOVERY, INTIAL HOUR: Performed by: OBSTETRICS & GYNECOLOGY

## 2020-06-23 PROCEDURE — 36000706: Performed by: OBSTETRICS & GYNECOLOGY

## 2020-06-23 PROCEDURE — 58558 PR HYSTEROSCOPY,W/ENDO BX: ICD-10-PCS | Mod: ,,, | Performed by: OBSTETRICS & GYNECOLOGY

## 2020-06-23 PROCEDURE — 27201423 OPTIME MED/SURG SUP & DEVICES STERILE SUPPLY: Performed by: OBSTETRICS & GYNECOLOGY

## 2020-06-23 PROCEDURE — 37000009 HC ANESTHESIA EA ADD 15 MINS: Performed by: OBSTETRICS & GYNECOLOGY

## 2020-06-23 PROCEDURE — D9220A PRA ANESTHESIA: Mod: ANES,,, | Performed by: ANESTHESIOLOGY

## 2020-06-23 PROCEDURE — D9220A PRA ANESTHESIA: ICD-10-PCS | Mod: ANES,,, | Performed by: ANESTHESIOLOGY

## 2020-06-23 PROCEDURE — 63600175 PHARM REV CODE 636 W HCPCS: Performed by: NURSE ANESTHETIST, CERTIFIED REGISTERED

## 2020-06-23 PROCEDURE — 88305 TISSUE EXAM BY PATHOLOGIST: CPT | Performed by: PATHOLOGY

## 2020-06-23 PROCEDURE — 25000003 PHARM REV CODE 250: Performed by: OBSTETRICS & GYNECOLOGY

## 2020-06-23 PROCEDURE — D9220A PRA ANESTHESIA: Mod: CRNA,,, | Performed by: NURSE ANESTHETIST, CERTIFIED REGISTERED

## 2020-06-23 PROCEDURE — 37000008 HC ANESTHESIA 1ST 15 MINUTES: Performed by: OBSTETRICS & GYNECOLOGY

## 2020-06-23 PROCEDURE — 36000707: Performed by: OBSTETRICS & GYNECOLOGY

## 2020-06-23 PROCEDURE — 88305 TISSUE EXAM BY PATHOLOGIST: CPT | Mod: 26,,, | Performed by: PATHOLOGY

## 2020-06-23 PROCEDURE — 58558 HYSTEROSCOPY BIOPSY: CPT | Mod: ,,, | Performed by: OBSTETRICS & GYNECOLOGY

## 2020-06-23 PROCEDURE — 63600175 PHARM REV CODE 636 W HCPCS: Performed by: ANESTHESIOLOGY

## 2020-06-23 PROCEDURE — C1782 MORCELLATOR: HCPCS | Performed by: OBSTETRICS & GYNECOLOGY

## 2020-06-23 PROCEDURE — 25000003 PHARM REV CODE 250: Performed by: NURSE ANESTHETIST, CERTIFIED REGISTERED

## 2020-06-23 PROCEDURE — 71000016 HC POSTOP RECOV ADDL HR: Performed by: OBSTETRICS & GYNECOLOGY

## 2020-06-23 PROCEDURE — 71000015 HC POSTOP RECOV 1ST HR: Performed by: OBSTETRICS & GYNECOLOGY

## 2020-06-23 PROCEDURE — D9220A PRA ANESTHESIA: ICD-10-PCS | Mod: CRNA,,, | Performed by: NURSE ANESTHETIST, CERTIFIED REGISTERED

## 2020-06-23 RX ORDER — ACETAMINOPHEN 10 MG/ML
1000 INJECTION, SOLUTION INTRAVENOUS ONCE
Status: COMPLETED | OUTPATIENT
Start: 2020-06-23 | End: 2020-06-23

## 2020-06-23 RX ORDER — ONDANSETRON 4 MG/1
8 TABLET, ORALLY DISINTEGRATING ORAL EVERY 8 HOURS PRN
Status: DISCONTINUED | OUTPATIENT
Start: 2020-06-23 | End: 2020-06-23 | Stop reason: HOSPADM

## 2020-06-23 RX ORDER — MUPIROCIN 20 MG/G
OINTMENT TOPICAL
Status: DISCONTINUED | OUTPATIENT
Start: 2020-06-23 | End: 2020-06-23 | Stop reason: HOSPADM

## 2020-06-23 RX ORDER — DIPHENHYDRAMINE HYDROCHLORIDE 50 MG/ML
25 INJECTION INTRAMUSCULAR; INTRAVENOUS EVERY 4 HOURS PRN
Status: DISCONTINUED | OUTPATIENT
Start: 2020-06-23 | End: 2020-06-23 | Stop reason: HOSPADM

## 2020-06-23 RX ORDER — LIDOCAINE HYDROCHLORIDE 20 MG/ML
INJECTION INTRAVENOUS
Status: DISCONTINUED | OUTPATIENT
Start: 2020-06-23 | End: 2020-06-23

## 2020-06-23 RX ORDER — DIPHENHYDRAMINE HCL 25 MG
25 CAPSULE ORAL EVERY 4 HOURS PRN
Status: DISCONTINUED | OUTPATIENT
Start: 2020-06-23 | End: 2020-06-23 | Stop reason: HOSPADM

## 2020-06-23 RX ORDER — HYDROMORPHONE HYDROCHLORIDE 2 MG/ML
1 INJECTION, SOLUTION INTRAMUSCULAR; INTRAVENOUS; SUBCUTANEOUS EVERY 6 HOURS PRN
Status: DISCONTINUED | OUTPATIENT
Start: 2020-06-23 | End: 2020-06-23 | Stop reason: HOSPADM

## 2020-06-23 RX ORDER — HYDROCODONE BITARTRATE AND ACETAMINOPHEN 5; 325 MG/1; MG/1
1 TABLET ORAL EVERY 4 HOURS PRN
Status: DISCONTINUED | OUTPATIENT
Start: 2020-06-23 | End: 2020-06-23 | Stop reason: HOSPADM

## 2020-06-23 RX ORDER — ONDANSETRON 2 MG/ML
INJECTION INTRAMUSCULAR; INTRAVENOUS
Status: DISCONTINUED | OUTPATIENT
Start: 2020-06-23 | End: 2020-06-23

## 2020-06-23 RX ORDER — SODIUM CHLORIDE 9 MG/ML
INJECTION, SOLUTION INTRAVENOUS CONTINUOUS
Status: DISCONTINUED | OUTPATIENT
Start: 2020-06-23 | End: 2020-06-23 | Stop reason: HOSPADM

## 2020-06-23 RX ORDER — HYDROMORPHONE HYDROCHLORIDE 2 MG/ML
0.2 INJECTION, SOLUTION INTRAMUSCULAR; INTRAVENOUS; SUBCUTANEOUS EVERY 5 MIN PRN
Status: DISCONTINUED | OUTPATIENT
Start: 2020-06-23 | End: 2020-06-23 | Stop reason: HOSPADM

## 2020-06-23 RX ORDER — SODIUM CHLORIDE 0.9 G/100ML
IRRIGANT IRRIGATION
Status: DISCONTINUED | OUTPATIENT
Start: 2020-06-23 | End: 2020-06-23 | Stop reason: HOSPADM

## 2020-06-23 RX ORDER — FENTANYL CITRATE 50 UG/ML
INJECTION, SOLUTION INTRAMUSCULAR; INTRAVENOUS
Status: DISCONTINUED | OUTPATIENT
Start: 2020-06-23 | End: 2020-06-23

## 2020-06-23 RX ORDER — MIDAZOLAM HYDROCHLORIDE 1 MG/ML
INJECTION, SOLUTION INTRAMUSCULAR; INTRAVENOUS
Status: DISCONTINUED | OUTPATIENT
Start: 2020-06-23 | End: 2020-06-23

## 2020-06-23 RX ORDER — SODIUM CHLORIDE 0.9 % (FLUSH) 0.9 %
10 SYRINGE (ML) INJECTION
Status: DISCONTINUED | OUTPATIENT
Start: 2020-06-23 | End: 2020-06-23 | Stop reason: HOSPADM

## 2020-06-23 RX ORDER — ONDANSETRON 2 MG/ML
4 INJECTION INTRAMUSCULAR; INTRAVENOUS DAILY PRN
Status: DISCONTINUED | OUTPATIENT
Start: 2020-06-23 | End: 2020-06-23 | Stop reason: HOSPADM

## 2020-06-23 RX ORDER — GLYCOPYRROLATE 0.2 MG/ML
INJECTION INTRAMUSCULAR; INTRAVENOUS
Status: DISCONTINUED | OUTPATIENT
Start: 2020-06-23 | End: 2020-06-23

## 2020-06-23 RX ORDER — MORPHINE SULFATE 10 MG/ML
3 INJECTION INTRAMUSCULAR; INTRAVENOUS; SUBCUTANEOUS
Status: DISCONTINUED | OUTPATIENT
Start: 2020-06-23 | End: 2020-06-23 | Stop reason: HOSPADM

## 2020-06-23 RX ORDER — PROPOFOL 10 MG/ML
VIAL (ML) INTRAVENOUS
Status: DISCONTINUED | OUTPATIENT
Start: 2020-06-23 | End: 2020-06-23

## 2020-06-23 RX ORDER — SODIUM CHLORIDE, SODIUM LACTATE, POTASSIUM CHLORIDE, CALCIUM CHLORIDE 600; 310; 30; 20 MG/100ML; MG/100ML; MG/100ML; MG/100ML
INJECTION, SOLUTION INTRAVENOUS CONTINUOUS PRN
Status: DISCONTINUED | OUTPATIENT
Start: 2020-06-23 | End: 2020-06-23

## 2020-06-23 RX ORDER — IBUPROFEN 600 MG/1
600 TABLET ORAL EVERY 6 HOURS PRN
Status: DISCONTINUED | OUTPATIENT
Start: 2020-06-23 | End: 2020-06-23 | Stop reason: HOSPADM

## 2020-06-23 RX ADMIN — IBUPROFEN 600 MG: 600 TABLET, FILM COATED ORAL at 10:06

## 2020-06-23 RX ADMIN — MIDAZOLAM HYDROCHLORIDE 2 MG: 1 INJECTION, SOLUTION INTRAMUSCULAR; INTRAVENOUS at 07:06

## 2020-06-23 RX ADMIN — ACETAMINOPHEN 1000 MG: 10 INJECTION, SOLUTION INTRAVENOUS at 08:06

## 2020-06-23 RX ADMIN — FENTANYL CITRATE 25 MCG: 50 INJECTION INTRAMUSCULAR; INTRAVENOUS at 07:06

## 2020-06-23 RX ADMIN — SODIUM CHLORIDE, SODIUM LACTATE, POTASSIUM CHLORIDE, AND CALCIUM CHLORIDE: .6; .31; .03; .02 INJECTION, SOLUTION INTRAVENOUS at 06:06

## 2020-06-23 RX ADMIN — PROPOFOL 120 MG: 10 INJECTION, EMULSION INTRAVENOUS at 07:06

## 2020-06-23 RX ADMIN — ONDANSETRON 4 MG: 2 INJECTION, SOLUTION INTRAMUSCULAR; INTRAVENOUS at 07:06

## 2020-06-23 RX ADMIN — FENTANYL CITRATE 50 MCG: 50 INJECTION INTRAMUSCULAR; INTRAVENOUS at 07:06

## 2020-06-23 RX ADMIN — GLYCOPYRROLATE 0.2 MG: 0.2 INJECTION, SOLUTION INTRAMUSCULAR; INTRAVENOUS at 07:06

## 2020-06-23 RX ADMIN — Medication 50 MG: at 07:06

## 2020-06-23 RX ADMIN — MUPIROCIN: 20 OINTMENT TOPICAL at 06:06

## 2020-06-23 NOTE — TRANSFER OF CARE
Anesthesia Transfer of Care Note    Patient: Magalis Rosales    Procedure(s) Performed: Procedure(s) (LRB):  HYSTEROSCOPY, WITH DILATION AND CURETTAGE OF UTERUS (N/A)    Patient location: PACU    Anesthesia Type: general    Transport from OR: Transported from OR on room air with adequate spontaneous ventilation    Post pain: adequate analgesia    Post assessment: no apparent anesthetic complications and tolerated procedure well    Post vital signs: stable    Level of consciousness: awake, alert and oriented    Nausea/Vomiting: no nausea/vomiting    Complications: none    Transfer of care protocol was followed      Last vitals:   Visit Vitals  /61 (BP Location: Right arm, Patient Position: Lying)   Pulse 99   Temp 36.7 °C (98.1 °F) (Oral)   Resp 12   Wt 73.5 kg (162 lb 0.6 oz)   LMP 06/03/2020 (Exact Date)   SpO2 99%   Breastfeeding No   BMI 28.70 kg/m²

## 2020-06-23 NOTE — ANESTHESIA POSTPROCEDURE EVALUATION
Anesthesia Post Evaluation    Patient: Magalis Rosales    Procedure(s) Performed: Procedure(s) (LRB):  HYSTEROSCOPY, WITH DILATION AND CURETTAGE OF UTERUS (N/A)    Final Anesthesia Type: general    Patient location during evaluation: PACU  Patient participation: Yes- Able to Participate  Level of consciousness: awake and alert and oriented  Post-procedure vital signs: reviewed and stable  Pain management: adequate  Airway patency: patent    PONV status at discharge: No PONV  Anesthetic complications: no      Cardiovascular status: hemodynamically stable and blood pressure returned to baseline  Respiratory status: spontaneous ventilation, room air and unassisted  Hydration status: euvolemic  Follow-up not needed.          Vitals Value Taken Time   /67 06/23/20 0846   Temp 36.4 °C (97.6 °F) 06/23/20 0845   Pulse 66 06/23/20 0855   Resp 11 06/23/20 0855   SpO2 98 % 06/23/20 0855   Vitals shown include unvalidated device data.      Event Time   Out of Recovery 09:01:12         Pain/Umm Score: Pain Rating Prior to Med Admin: 3 (6/23/2020  8:54 AM)  Pain Rating Post Med Admin: 3 (6/23/2020  8:54 AM)  Umm Score: 10 (6/23/2020  9:03 AM)

## 2020-06-23 NOTE — OP NOTE
OPERATIVE PROCEDURE    Preoperative Diagnosis: 1.  Uterine fibroids   2.  Intermenstrual bleeding   3. Stenotic cervix    Postoperative Diagnosis: Same    Surgeon: Ivone Coates MD    Assistants: None    Procedure: Hysteroscopy endometrial sampling, sharp D&C.    Anesthesia: GETA    Findings:  Stenotic cervix, normal uterine cavity.  Bulky fibroid uterus    EBL: Minimal    Antibiotics: none    Complications: None    Pathology: Endometrial currettings sent to pathology    Indications:  48 y/o female with IMB and uterine fibroids, stenotic cervix. She was counseled for hysteroscopy D&C for IMB.  All risks/benefits/alternatives discussed with pt prior to procedure. She understood and wished to proceed. All questions answered.    Procedure:    The patient was taking to the operating room where general anesthesia was noted to be adequate. She was then prepped and draped in the dorsal supine position with lower extremities in candy cane stirrups. The bladder was drained. An appropriate time out was performed.    A right angle retractor was placed in the patient's vagina and a renetta retractor used to identify the anterior lip of the cervix which was grasped with a single tooth tenaculum. The cervix was dilated with lacrimal duct dilators, even with that we were not able to fit the next set of dilators. We then proceed with the plastic os dilator and able to pass the endometrial sampling pipelle through.  Scant tissue was aspirated.  We then tried the Arreguin dilators again, and this time we were able to dilate to accommodate the hysteroscope. The hysteroscope was inserted atraumatically under direct visualization. The saline was then infused under low pressure to perform a diagnostic hysteroscopy. Both tubal ostia were visualized.  There was no intrauterine cavity indentation from the fibroids.   The myosure device was inserted into the uterus and endometrial sampling was done throughout the cavity.  This was sent to  pathology for inspection. The hysteroscope and myosure were removed. Total fluid deficit was 275 cc.    All instruments were removed, and there was no active bleeding seen. There were no complications. The patient tolerated procedure well and was discharged home with standard instructions. She was advised to call if any problems.    Pt tolerated the procedure well. Sponge, lap, and needle counts were correct x 2. She was taken to the recovery room in stable condition.    Nesha Coates MD

## 2020-06-23 NOTE — PLAN OF CARE
Pacu plan of care note. Patient Alert. VSS. Umm 10/10. Denies pain and nausea. Dressing clean, dry and intact. Safety precautions maintained. Ready for transfer to same day surgery unit.

## 2020-06-23 NOTE — ANESTHESIA PREPROCEDURE EVALUATION
2020  Magalis Rosales is a 49 y.o., female.    Pre-operative evaluation for Procedure(s) (LRB):  HYSTEROSCOPY, WITH DILATION AND CURETTAGE OF UTERUS (N/A)    Magalis Rosales is a 49 y.o. female     Denies CP/SOB/GERD/MI/CVA/URI symptoms.  METS > 4  NPO > 8    Patient Active Problem List   Diagnosis    Uterine fibroid    Dysarthria    Hypothyroid    Migraines    Bell's palsy    Hemifacial spasm    Uterine leiomyoma       Review of patient's allergies indicates:  No Known Allergies    No current facility-administered medications on file prior to encounter.      Current Outpatient Medications on File Prior to Encounter   Medication Sig Dispense Refill    docusate sodium (COLACE) 100 MG capsule Take 1 capsule (100 mg total) by mouth 2 (two) times daily. 60 capsule 11    ferrous sulfate (FEOSOL) 325 mg (65 mg iron) Tab tablet Take 1 tablet (325 mg total) by mouth daily with breakfast. 30 tablet 11    levothyroxine (SYNTHROID) 200 MCG tablet Take 1 tablet (200 mcg total) by mouth once daily. 30 tablet 11    sertraline (ZOLOFT) 100 MG tablet Take 1 tablet (100 mg total) by mouth once daily. 90 tablet 1    sumatriptan (IMITREX) 50 MG tablet TAKE 1 TABLET BY MOUTH AT ONSET OF HEADACHE, MAY REPEAT IN 2 HOURS. NOT MORE THAN 2 IN 24 HOURS 21 tablet 1    topiramate (TOPAMAX) 100 MG tablet TAKE ONE TABLET BY MOUTH TWICE DAILY 60 tablet 2    chlorhexidine (PERIDEX) 0.12 % solution       fluocinonide (LIDEX) 0.05 % ointment Apply topically 2 (two) times daily. 15 g 0     mg tablet       triamcinolone acetonide 0.1% (KENALOG) 0.1 % ointment Apply topically 2 (two) times daily. 30 g 0       Past Surgical History:   Procedure Laterality Date    APPENDECTOMY       SECTION      LASER ABLATION OF THE CERVIX         Social History     Socioeconomic History    Marital status: Single     Spouse  name: Not on file    Number of children: 1    Years of education: Not on file    Highest education level: Not on file   Occupational History    Occupation: Unemployed - substitute teaching   Social Needs    Financial resource strain: Not on file    Food insecurity     Worry: Not on file     Inability: Not on file    Transportation needs     Medical: Not on file     Non-medical: Not on file   Tobacco Use    Smoking status: Never Smoker    Smokeless tobacco: Never Used   Substance and Sexual Activity    Alcohol use: No    Drug use: No    Sexual activity: Not Currently     Partners: Male   Lifestyle    Physical activity     Days per week: Not on file     Minutes per session: Not on file    Stress: Not on file   Relationships    Social connections     Talks on phone: Not on file     Gets together: Not on file     Attends Je[employer]'s witness service: Not on file     Active member of club or organization: Not on file     Attends meetings of clubs or organizations: Not on file     Relationship status: Not on file   Other Topics Concern    Patient feels they ought to cut down on drinking/drug use Not Asked    Patient annoyed by others criticizing their drinking/drug use Not Asked    Patient has felt bad or guilty about drinking/drug use Not Asked    Patient has had a drink/used drugs as an eye opener in the AM Not Asked   Social History Narrative    Not on file         CBC: No results for input(s): WBC, RBC, HGB, HCT, PLT, MCV, MCH, MCHC in the last 72 hours.    CMP: No results for input(s): NA, K, CL, CO2, BUN, CREATININE, GLU, MG, PHOS, CALCIUM, ALBUMIN, PROT, ALKPHOS, ALT, AST, BILITOT in the last 72 hours.    INR  No results for input(s): PT, INR, PROTIME, APTT in the last 72 hours.      Vitals:    06/23/20 0600   BP: 108/65   Pulse: (!) 56   Resp: 17   Temp: 36.7 °C (98.1 °F)     See nursing charting for additional vital signs      Diagnostic Studies:  Results for SARAH CASTRO (MRN [id number]) as of 6/23/2020  06:42   Ref. Range 6/15/2020 11:30   WBC Latest Ref Range: 3.90 - 12.70 K/uL 9.60   RBC Latest Ref Range: 4.00 - 5.40 M/uL 4.50   Hemoglobin Latest Ref Range: 12.0 - 16.0 g/dL 10.7 (L)   Hematocrit Latest Ref Range: 37.0 - 48.5 % 35.6 (L)   MCV Latest Ref Range: 82 - 98 fL 79 (L)   MCH Latest Ref Range: 27.0 - 31.0 pg 23.8 (L)   MCHC Latest Ref Range: 32.0 - 36.0 g/dL 30.1 (L)   RDW Latest Ref Range: 11.5 - 14.5 % 20.9 (H)   Platelets Latest Ref Range: 150 - 350 K/uL 246   MPV Latest Ref Range: 9.2 - 12.9 fL SEE COMMENT   Gran% Latest Ref Range: 38.0 - 73.0 % 69.6   Gran # (ANC) Latest Ref Range: 1.8 - 7.7 K/uL 6.7   Lymph% Latest Ref Range: 18.0 - 48.0 % 23.4   Lymph # Latest Ref Range: 1.0 - 4.8 K/uL 2.3   Mono% Latest Ref Range: 4.0 - 15.0 % 5.4   Mono # Latest Ref Range: 0.3 - 1.0 K/uL 0.5   Eosinophil% Latest Ref Range: 0.0 - 8.0 % 0.9   Eos # Latest Ref Range: 0.0 - 0.5 K/uL 0.1   Basophil% Latest Ref Range: 0.0 - 1.9 % 0.5   Baso # Latest Ref Range: 0.00 - 0.20 K/uL 0.05   nRBC Latest Ref Range: 0 /100 WBC 0   Differential Method Unknown Automated   Immature Grans (Abs) Latest Ref Range: 0.00 - 0.04 K/uL 0.02   Immature Granulocytes Latest Ref Range: 0.0 - 0.5 % 0.2   Sodium Latest Ref Range: 136 - 145 mmol/L 141   Potassium Latest Ref Range: 3.5 - 5.1 mmol/L 4.3   Chloride Latest Ref Range: 95 - 110 mmol/L 112 (H)   CO2 Latest Ref Range: 23 - 29 mmol/L 21 (L)   Anion Gap Latest Ref Range: 8 - 16 mmol/L 8   BUN, Bld Latest Ref Range: 6 - 20 mg/dL 16   Creatinine Latest Ref Range: 0.5 - 1.4 mg/dL 0.9   eGFR if non African American Latest Ref Range: >60 mL/min/1.73 m^2 >60   eGFR if  Latest Ref Range: >60 mL/min/1.73 m^2 >60   Glucose Latest Ref Range: 70 - 110 mg/dL 76   Calcium Latest Ref Range: 8.7 - 10.5 mg/dL 8.8   Alkaline Phosphatase Latest Ref Range: 55 - 135 U/L 82   PROTEIN TOTAL Latest Ref Range: 6.0 - 8.4 g/dL 7.9   Albumin Latest Ref Range: 3.5 - 5.2 g/dL 3.8   BILIRUBIN TOTAL  Latest Ref Range: 0.1 - 1.0 mg/dL 0.3   AST Latest Ref Range: 10 - 40 U/L 57 (H)   ALT Latest Ref Range: 10 - 44 U/L 59 (H)     Results for SARAH CASTRO (MRN [id number]) as of 6/23/2020 06:42   Ref. Range 6/22/2020 07:38   SARS-CoV-2 RNA, Amplification, Qual Latest Ref Range: Negative  Negative     EKG (9/17/19):  Sinus bradycardia     TTE (9/18/19):  · Normal left ventricular systolic function. The estimated ejection fraction is 55%  · No wall motion abnormalities.  · Normal right ventricular systolic function.  · Mild tricuspid regurgitation.  · The estimated PA systolic pressure is 26 mm Hg    Anesthesia Evaluation    I have reviewed the Patient Summary Reports.    I have reviewed the Nursing Notes.       Review of Systems  Anesthesia Hx:  No problems with previous Anesthesia   Denies Personal Hx of Anesthesia complications.   Social:  Non-Smoker, No Alcohol Use    Hematology/Oncology:         -- Anemia:   Cardiovascular:   Exercise tolerance: good ECG has been reviewed.    Pulmonary:  Pulmonary Normal    Hepatic/GI:  Hepatic/GI Normal    Neurological:   Headaches    Endocrine:   Hypothyroidism        Physical Exam  General:  Well nourished    Airway/Jaw/Neck:   MP3, TMD > 3FB, upper middle and incisors are a temporary bridge being held on by glue and covered by retainer for protection     Chest/Lungs:  Chest/Lungs Clear    Heart/Vascular:  Heart Findings: Normal            Anesthesia Plan  Type of Anesthesia, risks & benefits discussed:  Anesthesia Type:  general  Patient's Preference:   Intra-op Monitoring Plan: standard ASA monitors  Intra-op Monitoring Plan Comments:   Post Op Pain Control Plan: multimodal analgesia, IV/PO Opioids PRN and per primary service following discharge from PACU  Post Op Pain Control Plan Comments:   Induction:   IV  Beta Blocker:  Patient is not currently on a Beta-Blocker (No further documentation required).       Informed Consent: Patient understands risks and agrees with  Anesthesia plan.  Questions answered. Anesthesia consent signed with patient.  ASA Score: 2     Day of Surgery Review of History & Physical:  There are no significant changes.          Ready For Surgery From Anesthesia Perspective.

## 2020-06-23 NOTE — DISCHARGE INSTRUCTIONS
ACTIVITY LEVEL:  If you have received sedation or an anesthetic, you may feel sleepy for several hours. Rest until you are more awake. Gradually resume your normal activities. No driving or alcoholic beverages for 24 hours.  ? Pelvic rest- no sex, tampons or douching until follow up or instructed by doctor.  ? No driving, alcoholic beverages or signing legal documents for next 24 hours or while taking pain medication  DIET:  You may resume your home diet. If nausea is present, increase your diet gradually with fluids and bland foods.    Medications:  Pain medication should be taken only if needed and as directed. If antibiotics are prescribed, the medication should be taken until completed. You will be given an updated list of you medications.    Last dose ibuprofen 600 mg was 10:00 am     CALL THE DOCTOR:          Shortness of breath, Coughing up Bloody Sputum or Pains or Swelling in your Calves.  Persistent pain or nausea not relieved by medication.  If vaginal bleeding is in excess of a normal period.  Problems urinating  Fever over 100.4    If any unusual problems or difficulties occur contact your doctor. If you cannot contact your doctor but feel your signs and symptoms warrant a physicians attention return to the emergency room.            Hysteroscopy    Hysteroscopy is a procedure that is done to see inside your uterus. It can help find the cause of problems in the uterus. This helps your health care provider decide on the best treatment. In some cases, it can be used to perform treatment. Hysteroscopy may be done in your health care provider's office or in the hospital.  Why might I need hysteroscopy?  Hysteroscopy may be done based on the results of other tests. It can help find the cause of problems. These can include:  · Unusually heavy or long menstrual periods  · Bleeding between periods  · Postmenopausal bleeding  · Trouble becoming pregnant (infertility) or carrying a pregnancy to term  · To  locate an intrauterine device (IUD)  · To perform sterilization  What are the risks and complications of hysteroscopy?  Problems with the procedure are rare. But all procedures have risks. Risks of hysteroscopy include:  · Infection  · Bleeding  · Tearing of the uterine wall  · Damage to internal organs  · Scarring of the uterus  · Fluid overload  · Problems with anesthesia (the medication that prevents pain during the procedure)  How do I get ready for hysteroscopy?  · Tell your health care provider if you have any health problems. These include diabetes, heart disease, or bleeding problems.  · Tell your health care provider about all the medicines you take. This includes any over-the-counter medications, herbs, or supplements.  · You may be told not to use vaginal creams or medication. And you may be told not to have sex or douche.  · You may be told not to eat or drink the night before the procedure.  · You may be tested for pregnancy and infection.  · You may be asked to sign a consent form.  · You may be given a pain reliever to take an hour before the procedure. This helps relieve cramping that may occur.  What happens during a hysteroscopy?  · Youll lie on an exam table with your feet in stirrups.  · You may be given general anesthesia or medicines to help you relax or sleep. In some cases, an IV line will be put into a vein in your arm or hand. This line is then used to give fluids and medicines.  · A tool called a speculum is inserted into the vagina to hold it open. A tool called a dilator may be used to widen the cervix.  · Numbing medicine may be applied to the cervix.  · The hysteroscope (a long, thin lighted tube) is inserted through the vagina and into the uterus. It is used to see inside the uterus. Images of the uterus are viewed on a monitor.  · A gas or fluid may be injected into the uterus to expand it.  · Other tools may be put through the hysteroscope. These are used to take tissue samples,  remove growths, or place implants for the purpose of sterilization.  What happens after hysteroscopy?  · You may have cramps and bleeding for 24 hours after the procedure. This is normal. Use pads instead of tampons.  · Do not douche or use tampons until your health care provider says its OK.  · Do not use any vaginal medicines until you are told its OK.  · Ask your health care provider when its OK to have sex again.  When should I call my health care provider?  Call your health care provider if you have:  · Heavy bleeding (more than 1 pad an hour for 2 or more hours)  · A fever over 100.4°F (38.0°C)  · Increasing abdominal pain or tenderness  · Foul-smelling discharge   Follow-up care  Schedule a follow-up visit with your health care provider. Based on the results of your test, you may need more treatment. Be sure to follow instructions and keep your appointments.  Date Last Reviewed: 2015  © 1233-9152 [employer]. [address] [place] [address], [place] [address]. All rights reserved. This information is not intended as a substitute for professional medical care. Always follow your healthcare professional's instructions.              Discharge Instructions for Dilation and Curettage (D and C)  Your doctor performed dilation and curettage (D&C). The reasons for having this procedure vary from person to person. The D&C may be done to control heavy uterine bleeding, to find the cause of irregular bleeding, to perform an , or to remove pregnancy tissue if you have had a miscarriage.  Home care  · Take it easy. Rest for 2 days as needed.  · Return to your normal activities after 24 to 48 hours. You may also return to work at that time.  · Eat a normal diet.  · Take an over-the-counter pain reliever for pain, if needed.  · Remember, its OK to have bleeding for about a week after the procedure. The amount of bleeding should be similar to what you have during a normal period.  · Dont drive for 24  hours after the procedure unless specifically told by your provider that it is OK to do so.  · Dont have sexual intercourse or use tampons or douches until your doctor says its safe to do so.  Follow-up  · Make a follow-up appointment as directed by our staff.     When to call your doctor  Call your doctor right away if you have any of the following:  · Bleeding that soaks more than one sanitary pad in one hour  · Severe abdominal pain  · Severe cramps  · Fever above 100.4°F (38.0°C)  · A foul smelling vaginal discharge   Date Last Reviewed: 5/19/2015  © 1168-7256 [employer]. [address] [place] [address], [place] [address]. All rights reserved. This information is not intended as a substitute for professional medical care. Always follow your healthcare professional's instructions.          Fall Prevention  Millions of people fall every year and injure themselves. You may have had anesthesia or sedation which may increase your risk of falling. You may have health issues that put you at an increased risk of falling.     Here are ways to reduce your risk of falling.  ·   · Make your home safe by keeping walkways clear of objects you may trip over.  · Use non-slip pads under rugs. Do not use area rugs or small throw rugs.  · Use non-slip mats in bathtubs and showers.  · Install handrails and lights on staircases.  · Do not walk in poorly lit areas.  · Do not stand on chairs or wobbly ladders.  · Use caution when reaching overhead or looking upward. This position can cause a loss of balance.  · Be sure your shoes fit properly, have non-slip bottoms and are in good condition.   · Wear shoes both inside and out. Avoid going barefoot or wearing slippers.  · Be cautious when going up and down stairs, curbs, and when walking on uneven sidewalks.  · If your balance is poor, consider using a cane or walker.  · If your fall was related to alcohol use, stop or limit alcohol intake.   · If your fall was related to use  of sleeping medicines, talk to your doctor about this. You may need to reduce your dosage at bedtime if you awaken during the night to go to the bathroom.    · To reduce the need for nighttime bathroom trips:  ¨ Avoid drinking fluids for several hours before going to bed  ¨ Empty your bladder before going to bed  ¨ Men can keep a urinal at the bedside  · Stay as active as you can. Balance, flexibility, strength, and endurance all come from exercise. They all play a role in preventing falls. Ask your healthcare provider which types of activity are right for you.  · Get your vision checked on a regular basis.  · If you have pets, know where they are before you stand up or walk so you don't trip over them.  · Use night lights.

## 2020-06-24 LAB
FINAL PATHOLOGIC DIAGNOSIS: NORMAL
GROSS: NORMAL

## 2020-06-24 NOTE — DISCHARGE SUMMARY
[facility]  Discharge Note  Short Stay    Procedure(s) (LRB):  HYSTEROSCOPY, WITH DILATION AND CURETTAGE OF UTERUS (N/A)    OUTCOME: Patient tolerated treatment/procedure well without complication and is now ready for discharge.    DISPOSITION: Home or Self Care    FINAL DIAGNOSIS:  IMB (intermenstrual bleeding)    FOLLOWUP: In clinic    DISCHARGE INSTRUCTIONS:    Discharge Procedure Orders   Diet Adult Regular     Pelvic Rest     Notify your health care provider if you experience any of the following:  increased confusion or weakness     Notify your health care provider if you experience any of the following:  persistent dizziness, light-headedness, or visual disturbances     Notify your health care provider if you experience any of the following:  worsening rash     Notify your health care provider if you experience any of the following:  severe persistent headache     Notify your health care provider if you experience any of the following:  redness, tenderness, or signs of infection (pain, swelling, redness, odor or green/yellow discharge around incision site)     Notify your health care provider if you experience any of the following:  severe uncontrolled pain     Notify your health care provider if you experience any of the following:  persistent nausea and vomiting or diarrhea     Notify your health care provider if you experience any of the following:  temperature >100.4     Notify your health care provider if you experience any of the following:  difficulty breathing or increased cough     Notify your health care provider if you experience any of the following:     Activity as tolerated        Clinical Reference Documents Added to Patient Instructions       Document    DILATION AND CURETTAGE (D AND C), DISCHARGE INSTRUCTIONS (ENGLISH)    HYSTEROSCOPY (ENGLISH)

## 2020-07-06 ENCOUNTER — OFFICE VISIT (OUTPATIENT)
Dept: OBSTETRICS AND GYNECOLOGY | Facility: CLINIC | Age: 50
End: 2020-07-06
Payer: COMMERCIAL

## 2020-07-06 VITALS — SYSTOLIC BLOOD PRESSURE: 114 MMHG | BODY MASS INDEX: 28.7 KG/M2 | WEIGHT: 162.06 LBS | DIASTOLIC BLOOD PRESSURE: 78 MMHG

## 2020-07-06 DIAGNOSIS — Z98.890 POST-OPERATIVE STATE: Primary | ICD-10-CM

## 2020-07-06 PROCEDURE — 99999 PR PBB SHADOW E&M-EST. PATIENT-LVL III: CPT | Mod: PBBFAC,,, | Performed by: OBSTETRICS & GYNECOLOGY

## 2020-07-06 PROCEDURE — 99999 PR PBB SHADOW E&M-EST. PATIENT-LVL III: ICD-10-PCS | Mod: PBBFAC,,, | Performed by: OBSTETRICS & GYNECOLOGY

## 2020-07-06 PROCEDURE — 99499 NO LOS: ICD-10-PCS | Mod: S$GLB,,, | Performed by: OBSTETRICS & GYNECOLOGY

## 2020-07-06 PROCEDURE — 99499 UNLISTED E&M SERVICE: CPT | Mod: S$GLB,,, | Performed by: OBSTETRICS & GYNECOLOGY

## 2020-07-06 RX ORDER — NORGESTIMATE AND ETHINYL ESTRADIOL 0.25-0.035
1 KIT ORAL DAILY
Qty: 28 TABLET | Refills: 5 | Status: SHIPPED | OUTPATIENT
Start: 2020-07-06 | End: 2020-08-01 | Stop reason: SDUPTHER

## 2020-07-06 NOTE — PROGRESS NOTES
CC: Postoperative visit    Magalis Rosales is a 49 y.o. female  presents for a postoperative visit s/p hscope D&C on 20 for IMB.  Her postoperative course was uncomplicated.  She is doing well postoperative.    She reported menses became heavy right before surgery and and wants to start on medication     Pathology showed:  Specimen submitted as endometrial biopsy:  -Fragments of secretory endometrium, post-ovulatory day 8 to 9. Glands are free of hyperplasia and atypia.  -Multiple fragments of smooth muscle, many containing benign endometrial glands with stroma. These  findings suggest the possibility of adenomyosis but could also be seen near leiomyoma surfaces. Clinical  correlation is necessary.      ROS:  GENERAL: No fever, chills, fatigability or weight loss.  VULVAR: No pain, no lesions and no itching.  VAGINAL: No relaxation, no itching, no discharge, no abnormal bleeding and no lesions.  ABDOMEN: No abdominal pain. Denies nausea. Denies vomiting. No diarrhea. No constipation  BREAST: Denies pain. No lumps. No discharge.  URINARY: No incontinence, no nocturia, no frequency and no dysuria.  CARDIOVASCULAR: No chest pain. No shortness of breath. No leg cramps.  NEUROLOGICAL: No headaches. No vision changes.    Physical Exam  /78 (BP Location: Left arm, Patient Position: Sitting, BP Method: Medium (Manual))   Wt 73.5 kg (162 lb 0.6 oz)   BMI 28.70 kg/m²   The patient appears well, alert, oriented x 3, in no distress.  Counseling appt only    A/P  1.  AUB-HMB/IMB - L:  Endometrial sampling negative.  Desires medical treatment.  Rx for sprintec sent.  Patient has no contraindications for OCP.     The use of the oral contraceptive has been fully discussed with the patient. This includes the proper method to initiate and continue the pills, the need for regular compliance to ensure adequate contraceptive effect, the physiology which make the pill effective, the instructions for what to do in  event of a missed pill, and warnings about anticipated minor side effects such as breakthrough spotting, nausea, breast tenderness, weight changes, acne, headaches, etc.  She has been told of the more serious potential side effects such as MI, stroke, and deep vein thrombosis, all of which are very unlikely.  She has been asked to report any signs of such serious problems immediately.  The need for additional protection, such as a condom, to prevent exposure to sexually transmitted diseases has also been discussed. She understands and wishes to take the medication as prescribed.    Patient to start the carter on the first day of menses.      RTC in 3 months for OCP f/u.

## 2020-07-26 DIAGNOSIS — G43.829 MENSTRUAL MIGRAINE WITHOUT STATUS MIGRAINOSUS, NOT INTRACTABLE: ICD-10-CM

## 2020-07-27 RX ORDER — SUMATRIPTAN 50 MG/1
TABLET, FILM COATED ORAL
Qty: 21 TABLET | Refills: 1 | Status: CANCELLED | OUTPATIENT
Start: 2020-07-27

## 2020-07-27 RX ORDER — SUMATRIPTAN 50 MG/1
TABLET, FILM COATED ORAL
Qty: 6 TABLET | Refills: 3 | Status: CANCELLED | OUTPATIENT
Start: 2020-07-27

## 2020-07-29 DIAGNOSIS — G43.829 MENSTRUAL MIGRAINE WITHOUT STATUS MIGRAINOSUS, NOT INTRACTABLE: ICD-10-CM

## 2020-07-29 RX ORDER — TOPIRAMATE 100 MG/1
TABLET, FILM COATED ORAL
Qty: 60 TABLET | Refills: 2 | Status: SHIPPED | OUTPATIENT
Start: 2020-07-29 | End: 2020-08-01 | Stop reason: SDUPTHER

## 2020-08-14 DIAGNOSIS — Z11.59 NEED FOR HEPATITIS C SCREENING TEST: ICD-10-CM

## 2020-08-26 DIAGNOSIS — G43.829 MENSTRUAL MIGRAINE WITHOUT STATUS MIGRAINOSUS, NOT INTRACTABLE: ICD-10-CM

## 2020-08-26 RX ORDER — SUMATRIPTAN 50 MG/1
TABLET, FILM COATED ORAL
Qty: 6 TABLET | Refills: 3 | Status: SHIPPED | OUTPATIENT
Start: 2020-08-26 | End: 2020-10-26 | Stop reason: SDUPTHER

## 2020-09-10 ENCOUNTER — PATIENT MESSAGE (OUTPATIENT)
Dept: FAMILY MEDICINE | Facility: CLINIC | Age: 50
End: 2020-09-10

## 2020-09-10 DIAGNOSIS — L20.9 ATOPIC DERMATITIS, UNSPECIFIED TYPE: Primary | ICD-10-CM

## 2020-09-28 ENCOUNTER — PATIENT OUTREACH (OUTPATIENT)
Dept: ADMINISTRATIVE | Facility: OTHER | Age: 50
End: 2020-09-28

## 2020-09-29 ENCOUNTER — OFFICE VISIT (OUTPATIENT)
Dept: OBSTETRICS AND GYNECOLOGY | Facility: CLINIC | Age: 50
End: 2020-09-29
Payer: COMMERCIAL

## 2020-09-29 VITALS
WEIGHT: 171.94 LBS | DIASTOLIC BLOOD PRESSURE: 86 MMHG | BODY MASS INDEX: 30.46 KG/M2 | SYSTOLIC BLOOD PRESSURE: 122 MMHG

## 2020-09-29 DIAGNOSIS — D25.9 UTERINE LEIOMYOMA, UNSPECIFIED LOCATION: Primary | ICD-10-CM

## 2020-09-29 PROBLEM — Z01.818 PRE-OP TESTING: Status: RESOLVED | Noted: 2020-06-23 | Resolved: 2020-09-29

## 2020-09-29 PROCEDURE — 3008F PR BODY MASS INDEX (BMI) DOCUMENTED: ICD-10-PCS | Mod: CPTII,S$GLB,, | Performed by: OBSTETRICS & GYNECOLOGY

## 2020-09-29 PROCEDURE — 99999 PR PBB SHADOW E&M-EST. PATIENT-LVL III: ICD-10-PCS | Mod: PBBFAC,,, | Performed by: OBSTETRICS & GYNECOLOGY

## 2020-09-29 PROCEDURE — 3008F BODY MASS INDEX DOCD: CPT | Mod: CPTII,S$GLB,, | Performed by: OBSTETRICS & GYNECOLOGY

## 2020-09-29 PROCEDURE — 99213 OFFICE O/P EST LOW 20 MIN: CPT | Mod: S$GLB,,, | Performed by: OBSTETRICS & GYNECOLOGY

## 2020-09-29 PROCEDURE — 99999 PR PBB SHADOW E&M-EST. PATIENT-LVL III: CPT | Mod: PBBFAC,,, | Performed by: OBSTETRICS & GYNECOLOGY

## 2020-09-29 PROCEDURE — 99213 PR OFFICE/OUTPT VISIT, EST, LEVL III, 20-29 MIN: ICD-10-PCS | Mod: S$GLB,,, | Performed by: OBSTETRICS & GYNECOLOGY

## 2020-09-29 NOTE — PROGRESS NOTES
SUBJECTIVE:   49 y.o. female   for f/u on ocp, fibroids    Patient's last menstrual period was 2020 (approximate)..      Pt was started on sprintec in 2020 for AUB-HMB/IMB - L:  endometrial sampling was benign  Did not see changes in flow so she stopped taking  However now denies IMB  Thinks her fibroid grow, feels abdomen is harder. Denies abdominal pain or worsening of dysmenorrhea    EXAMINATION:  US PELVIS COMP WITH TRANSVAG NON-OB (XPD)     CLINICAL HISTORY:  IMB, fibroids;  Encounter for gynecological examination (general) (routine) with abnormal findings     TECHNIQUE:  Transabdominal and transvaginal pelvic ultrasound.     COMPARISON:  None     FINDINGS:  Uterus: Measures 11.0 x 7.5 x 9.2 cm.  Endometrial echo complex measures 6 mm, unremarkable.  There are numerous uterine fibroids.  Anterior intramural myoma measures 3.1 cm.  Lower uterine segment myoma measures 3.8 cm.     Right ovary: Not visualized.     Left ovary: Measures 3.1 x 2.7 x 1.6 cm.  Appearance is unremarkable.  Blood flow is present.     Miscellaneous: No free fluid.     Impression:     Leiomyomatous uterus.        Electronically signed by: Renny Xiao MD  Date:                                            2020  Time:                                           15:01  OB History    Para Term  AB Living   1 1 1     1   SAB TAB Ectopic Multiple Live Births           1      # Outcome Date GA Lbr Ryley/2nd Weight Sex Delivery Anes PTL Lv   1 Term               Obstetric Comments   Gynhx;  Reg   Not currently sexually active   Uterine fibroid x years - no symptoms   H/o abnormal pap, s/p laser in ,    Pap 2020 NEG/other HR HPV positive   [ ] repeat pap 2021   MMG 2018 NEG     Past Medical History:   Diagnosis Date    History of psychiatric care     History of psychiatric hospitalization     Hypothyroidism     Migraines     Psychiatric exam requested by authority     Psychiatric problem     Therapy       Past Surgical History:   Procedure Laterality Date    APPENDECTOMY       SECTION      HYSTEROSCOPY WITH DILATION AND CURETTAGE OF UTERUS N/A 2020    Procedure: HYSTEROSCOPY, WITH DILATION AND CURETTAGE OF UTERUS;  Surgeon: Nesha Skaggs Mai, MD;  Location: [facility];  Service: OB/GYN;  Laterality: N/A;  RN PRE OP 6- COVID & T&S 2020. [place]  H/P incomplete----UPT    LASER ABLATION OF THE CERVIX       Social History     Socioeconomic History    Marital status: Single     Spouse name: Not on file    Number of children: 1    Years of education: Not on file    Highest education level: Not on file   Occupational History    Occupation: Unemployed - substitute teaching   Social Needs    Financial resource strain: Not on file    Food insecurity     Worry: Not on file     Inability: Not on file    Transportation needs     Medical: Not on file     Non-medical: Not on file   Tobacco Use    Smoking status: Never Smoker    Smokeless tobacco: Never Used   Substance and Sexual Activity    Alcohol use: No    Drug use: No    Sexual activity: Not Currently     Partners: Male   Lifestyle    Physical activity     Days per week: Not on file     Minutes per session: Not on file    Stress: Not on file   Relationships    Social connections     Talks on phone: Not on file     Gets together: Not on file     Attends Rastafari service: Not on file     Active member of club or organization: Not on file     Attends meetings of clubs or organizations: Not on file     Relationship status: Not on file   Other Topics Concern    Patient feels they ought to cut down on drinking/drug use Not Asked    Patient annoyed by others criticizing their drinking/drug use Not Asked    Patient has felt bad or guilty about drinking/drug use Not Asked    Patient has had a drink/used drugs as an eye opener in the AM Not Asked   Social History Narrative    Not on file     Family History   Problem Relation Age of Onset     Hypertension Mother     Hypertension Father     Stroke Father     Ovarian cancer Paternal Grandmother          Current Outpatient Medications   Medication Sig Dispense Refill    chlorhexidine (PERIDEX) 0.12 % solution       docusate sodium (COLACE) 100 MG capsule Take 1 capsule (100 mg total) by mouth 2 (two) times daily. 60 capsule 11    ferrous sulfate (FEOSOL) 325 mg (65 mg iron) Tab tablet Take 1 tablet (325 mg total) by mouth daily with breakfast. 30 tablet 11    fluocinonide (LIDEX) 0.05 % ointment Apply topically 2 (two) times daily. 15 g 0    HYDROcodone-acetaminophen (NORCO) 5-325 mg per tablet Take 1 tablet by mouth every 6 (six) hours as needed for Pain. 10 tablet 0    ibuprofen (ADVIL,MOTRIN) 600 MG tablet Take 1 tablet (600 mg total) by mouth every 6 (six) hours as needed for Pain. 30 tablet 0    levothyroxine (SYNTHROID) 200 MCG tablet Take 1 tablet (200 mcg total) by mouth once daily. 30 tablet 0    norgestimate-ethinyl estradioL (ORTHO-CYCLEN) 0.25-35 mg-mcg per tablet Take 1 tablet by mouth once daily. 28 tablet 5    sertraline (ZOLOFT) 100 MG tablet TAKE 1 AND 1/2 TABLETS BY MOUTH EVERY DAY  90 tablet 1    sumatriptan (IMITREX) 50 MG tablet One tab po at onset of headache may take second pill after 2 hours if no improvement not to exceed two tablets in 24hours 6 tablet 3    topiramate (TOPAMAX) 100 MG tablet Take 1 tablet (100 mg total) by mouth 2 (two) times daily. 60 tablet 0    triamcinolone acetonide 0.1% (KENALOG) 0.1 % ointment Apply topically 2 (two) times daily. 30 g 0     No current facility-administered medications for this visit.      Allergies: Patient has no known allergies.       ROS:  GENERAL: Denies weight gain or weight loss. Feeling well overall.   SKIN: Denies rash or lesions.   HEAD: Denies head injury or headache.   NODES: Denies enlarged lymph nodes.   CHEST: Denies chest pain or shortness of breath.   CARDIOVASCULAR: Denies palpitations or left sided chest  pain.   ABDOMEN: No abdominal pain, constipation, diarrhea, nausea, vomiting or rectal bleeding.   URINARY: No frequency, dysuria, hematuria, or burning on urination.  REPRODUCTIVE: see HPI  BREASTS: The patient performs breast self-examination and denies pain, lumps, or nipple discharge.   HEMATOLOGIC: No easy bruisability or excessive bleeding.  MUSCULOSKELETAL: Denies joint pain or swelling.   NEUROLOGIC: Denies syncope or weakness.   PSYCHIATRIC: Denies depression, anxiety or mood swings.      OBJECTIVE:   /86   Wt 78 kg (171 lb 15.3 oz)   LMP 08/28/2020 (Approximate)   BMI 30.46 kg/m²   The patient appears well, alert, oriented x 3, in no distress.  NECK: negative, no thyromegaly, trachea midline  SKIN: normal and no acne, striae, hirsutism  BREAST EXAM: not examined  ABDOMEN: soft, non-tender; bowel sounds normal; no masses,  no organomegaly and no hernias, masses, or hepatosplenomegaly  BLADDER: soft  GENITALIA: normal external genitalia, no erythema, no discharge  URETHRA: normal appearing urethra with no masses, tenderness or lesions and normal urethra, normal urethral meatus  VAGINA: Normal  CERVIX: cervix displaced due to enlarged fibroid  UTERUS: enlarged/bulky, 12-14 weeks size. appx 3-4 cm FRAN fibroid palpated. Unchanged from prior exam  ADNEXA: no mass, fullness, tenderness      ASSESSMENT:   1. Uterine fibroid:  Pelvic exam unchanged, discussed with patient given pt without changes in her symptoms, repeat US not indicated.  2.  rtc prn or for wwe in 6/2021

## 2020-10-26 DIAGNOSIS — G43.829 MENSTRUAL MIGRAINE WITHOUT STATUS MIGRAINOSUS, NOT INTRACTABLE: ICD-10-CM

## 2020-10-26 RX ORDER — SUMATRIPTAN 50 MG/1
TABLET, FILM COATED ORAL
Qty: 6 TABLET | Refills: 3 | Status: SHIPPED | OUTPATIENT
Start: 2020-10-26 | End: 2020-12-29

## 2020-10-27 ENCOUNTER — PATIENT MESSAGE (OUTPATIENT)
Dept: OBSTETRICS AND GYNECOLOGY | Facility: CLINIC | Age: 50
End: 2020-10-27

## 2020-10-28 RX ORDER — DOCUSATE SODIUM 100 MG/1
100 CAPSULE, LIQUID FILLED ORAL 2 TIMES DAILY
Qty: 60 CAPSULE | Refills: 11 | Status: SHIPPED | OUTPATIENT
Start: 2020-10-28 | End: 2021-01-25 | Stop reason: CLARIF

## 2020-11-08 DIAGNOSIS — F33.41 RECURRENT MAJOR DEPRESSIVE DISORDER, IN PARTIAL REMISSION: ICD-10-CM

## 2020-11-09 RX ORDER — SERTRALINE HYDROCHLORIDE 100 MG/1
TABLET, FILM COATED ORAL
Qty: 135 TABLET | Refills: 1 | Status: SHIPPED | OUTPATIENT
Start: 2020-11-09 | End: 2021-05-07

## 2020-11-25 ENCOUNTER — LAB VISIT (OUTPATIENT)
Dept: LAB | Facility: HOSPITAL | Age: 50
End: 2020-11-25
Attending: INTERNAL MEDICINE
Payer: COMMERCIAL

## 2020-11-25 ENCOUNTER — CLINICAL SUPPORT (OUTPATIENT)
Dept: URGENT CARE | Facility: CLINIC | Age: 50
End: 2020-11-25
Payer: COMMERCIAL

## 2020-11-25 ENCOUNTER — PATIENT MESSAGE (OUTPATIENT)
Dept: FAMILY MEDICINE | Facility: CLINIC | Age: 50
End: 2020-11-25

## 2020-11-25 DIAGNOSIS — R79.89 ABNORMAL TSH: ICD-10-CM

## 2020-11-25 DIAGNOSIS — Z11.59 ENCOUNTER FOR SCREENING FOR OTHER VIRAL DISEASES: Primary | ICD-10-CM

## 2020-11-25 DIAGNOSIS — Z11.59 NEED FOR HEPATITIS C SCREENING TEST: ICD-10-CM

## 2020-11-25 DIAGNOSIS — R79.89 ABNORMAL TSH: Primary | ICD-10-CM

## 2020-11-25 LAB
CTP QC/QA: YES
SARS-COV-2 RDRP RESP QL NAA+PROBE: NEGATIVE
T4 FREE SERPL-MCNC: 1 NG/DL (ref 0.71–1.51)
TSH SERPL DL<=0.005 MIU/L-ACNC: 7.61 UIU/ML (ref 0.4–4)

## 2020-11-25 PROCEDURE — 84439 ASSAY OF FREE THYROXINE: CPT

## 2020-11-25 PROCEDURE — 84443 ASSAY THYROID STIM HORMONE: CPT

## 2020-11-25 PROCEDURE — 36415 COLL VENOUS BLD VENIPUNCTURE: CPT | Mod: PN

## 2020-11-25 PROCEDURE — U0002 COVID-19 LAB TEST NON-CDC: HCPCS | Mod: QW,S$GLB,, | Performed by: PHYSICIAN ASSISTANT

## 2020-11-25 PROCEDURE — U0002: ICD-10-PCS | Mod: QW,S$GLB,, | Performed by: PHYSICIAN ASSISTANT

## 2020-11-25 PROCEDURE — 86803 HEPATITIS C AB TEST: CPT

## 2020-11-25 NOTE — PROGRESS NOTES
CDC TESTING AND QUARANTINE GUIDELINES FOR EXPOSURE   A CLOSE EXPOSURE IS DEFINED AS ANYONE WHO HAD A MASKED OR AN UNMASKED EXPOSURE TO A KNOWN  19 POSITIVE PERSON, AT LESS THAN 6 FT FOR MORE THAN 15 MINUTES. IF YOUR EXPOSURE MEETS THIS DEFINITION, THEN YOU ARE REQUIRED TO QUARANTINE FOR 14 DAYS PER THE CDC. THEY RECOMMEND THAT A TEST CAN BE PERFORMED IF YOU ASYMPTOMATIC.(SOMEONE WHO DOES NOT HAVE ANY SYMPTOMS), AND A TEST SHOULD BE DONE IF YOU DEVELOP SYMPTOMS AFTER AN EXPOSURE AS DESCRIBED ABOVE.    IF YOU MEET THE DEFINITION OF A CLOSE EXPOSURE, IT DOES NOT MATTER WHETHER OR NOT YOU ARE ASYMPTOMATIC OR SYMPTOMATIC- A NEGATIVE TEST DOES NOT GET YOU OUT OF 14 DAYS OF QUARANTINE!    PLEASE NOTE THAT IF YOU ARE ASYMPTOMATIC AND WAIT MORE THAN 4 DAYS TO TEST AFTER AN EXPOSURE, YOU RISK LENGTHENING YOUR QUARANTINE.THIS IS BECAUSE IF YOU TEST POSITIVE AS AN ASYMPTOMATIC, YOUR ISOLATION IS 10 DAYS FROM THE DATE OF THE POSITIVE TEST, NOT THE DATE OF EXPOSURE. SO FOR EXAMPLE, IF YOU TEST POSITIVE AS AN ASYMPTOMATIC ON DAY 7 FROM EXPOSURE, YOU HAVE NOW EXTENDED YOUR 14 DAY QUARANTINE TO A  17 DAY ISOLAION    IF YOUR EXPOSURE DOES NOT MEET THE ABOVE DEFINITION, YOU MAY RETURN TO YOUR NORMAL ACTIVITIES INCLUDING SOCIAL DISTANCING, WEARING MASKS, AND FREQUENT HANDWASHING

## 2020-11-25 NOTE — PATIENT INSTRUCTIONS
"Your test was NEGATIVE for COVID-19 (coronavirus).      You may leave home and/or return to work when the following conditions are met:   24 hours fever free without fever-reducing medications AND   Improved symptoms   You have not met the conditions of a closed exposure       A "close exposure" is defined as anyone who has had an exposure (masked or unmasked) to a known COVID -19 positive person within 6 ft for longer than 15 minutes. If your exposure meets this definition you are required by CDC guidelines to quarantine for 14 days from time of exposure regardless of test status.      Additional instructions:  · Social distance per your local guidelines  · Call ahead before visiting your doctor.  · Wear a facemask when around others who do not live in your household.  · Cover your coughs and sneezes.  · Wash your hands often with soap and water; hand  can be used, too.      If your symptoms worsen or if you have any other concerns, please contact [facility] at [phone].     Sincerely,    LINSEY PAZ RT  "

## 2020-11-27 ENCOUNTER — PATIENT MESSAGE (OUTPATIENT)
Dept: FAMILY MEDICINE | Facility: CLINIC | Age: 50
End: 2020-11-27

## 2020-11-27 DIAGNOSIS — E03.9 HYPOTHYROIDISM, UNSPECIFIED TYPE: Primary | ICD-10-CM

## 2020-11-27 LAB — HCV AB SERPL QL IA: NEGATIVE

## 2020-12-04 ENCOUNTER — OFFICE VISIT (OUTPATIENT)
Dept: ENDOCRINOLOGY | Facility: CLINIC | Age: 50
End: 2020-12-04
Payer: COMMERCIAL

## 2020-12-04 VITALS
DIASTOLIC BLOOD PRESSURE: 85 MMHG | BODY MASS INDEX: 32.77 KG/M2 | SYSTOLIC BLOOD PRESSURE: 134 MMHG | WEIGHT: 185 LBS | TEMPERATURE: 98 F | HEART RATE: 61 BPM

## 2020-12-04 DIAGNOSIS — R63.5 WEIGHT GAIN: ICD-10-CM

## 2020-12-04 DIAGNOSIS — E03.9 HYPOTHYROIDISM, UNSPECIFIED TYPE: Primary | ICD-10-CM

## 2020-12-04 DIAGNOSIS — E66.9 OBESITY, UNSPECIFIED CLASSIFICATION, UNSPECIFIED OBESITY TYPE, UNSPECIFIED WHETHER SERIOUS COMORBIDITY PRESENT: ICD-10-CM

## 2020-12-04 PROCEDURE — 3008F BODY MASS INDEX DOCD: CPT | Mod: CPTII,S$GLB,, | Performed by: HOSPITALIST

## 2020-12-04 PROCEDURE — 1126F AMNT PAIN NOTED NONE PRSNT: CPT | Mod: S$GLB,,, | Performed by: HOSPITALIST

## 2020-12-04 PROCEDURE — 1126F PR PAIN SEVERITY QUANTIFIED, NO PAIN PRESENT: ICD-10-PCS | Mod: S$GLB,,, | Performed by: HOSPITALIST

## 2020-12-04 PROCEDURE — 3008F PR BODY MASS INDEX (BMI) DOCUMENTED: ICD-10-PCS | Mod: CPTII,S$GLB,, | Performed by: HOSPITALIST

## 2020-12-04 PROCEDURE — 99999 PR PBB SHADOW E&M-EST. PATIENT-LVL IV: ICD-10-PCS | Mod: PBBFAC,,, | Performed by: HOSPITALIST

## 2020-12-04 PROCEDURE — 99999 PR PBB SHADOW E&M-EST. PATIENT-LVL IV: CPT | Mod: PBBFAC,,, | Performed by: HOSPITALIST

## 2020-12-04 PROCEDURE — 99204 OFFICE O/P NEW MOD 45 MIN: CPT | Mod: S$GLB,,, | Performed by: HOSPITALIST

## 2020-12-04 PROCEDURE — 99204 PR OFFICE/OUTPT VISIT, NEW, LEVL IV, 45-59 MIN: ICD-10-PCS | Mod: S$GLB,,, | Performed by: HOSPITALIST

## 2020-12-04 RX ORDER — LEVOTHYROXINE SODIUM 200 UG/1
200 TABLET ORAL
Qty: 30 TABLET | Refills: 11 | Status: SHIPPED | OUTPATIENT
Start: 2020-12-04 | End: 2021-06-14 | Stop reason: SDUPTHER

## 2020-12-04 NOTE — LETTER
December 4, 2020      Brock Womack MD  [address]  [place] [id number]           [facility]  [address]  [place] [address]  Phone: [phone]  Fax: [phone]          Patient: Magalis Rosales   MR Number: [id number]   YOB: 1970   Date of Visit: 12/4/2020       Dear Dr. Brock Womack:    Thank you for referring Magalis Rosales to me for evaluation. Attached you will find relevant portions of my assessment and plan of care.    If you have questions, please do not hesitate to call me. I look forward to following Magalis Rosales along with you.    Sincerely,    Brooks Cai MD    Enclosure  CC:  No Recipients    If you would like to receive this communication electronically, please contact [email or web address] or [phone] to request more information on [employer] access.    For providers and/or their staff who would like to refer a patient to [facility], please contact us through our one-stop-shop provider referral line, [facility], [place], at [phone].    If you feel you have received this communication in error or would no longer like to receive these types of communications, please e-mail [email or web address]

## 2020-12-04 NOTE — PROGRESS NOTES
Subjective:      Patient ID: Magalis Rosales is a 49 y.o. female presented to Endocrinology clinic on 12/4/2020.    Chief Complaint:  Thyroid Problem      History of Present Illness: Magalis Rosales is a 49 y.o. female with hypothyroidism who is here for initial evaluation.    Hypothyroidism, Initial Evaluation  Patient presents for evaluation of thyroid function. It has been going on for 16 years on levothyroxine  Dx in in the [facility] at that time, was told it was Hashimoto    No hx of graves dx  US Thyroid in the past, was told there was no nodules  No family hx of thyroid problem    Had name brand Synthroid in the past due to abnormal lab work    Current lead taking generic Levothyroxine 200 mcg daily, dose was increased from 175mcg in 2019  Takes thyroid medication properly without food first thing in the morning   May forget to take it 1x a week    New skin rash on patient's arm, on steroid cream with improvement  Take iron pills at night for anemia since june  Does not take other medication until at night time    Current symptoms:   No   Yes  []    [x]   Weight gain>>20lbs  []    []   Fatigue  []    [x]   Constipation  []    [x]   Hair loss  [x]    []   Brittle nails  [x]    []   Mental fog  [x]    []   Cold intolerance    Patient reports polyuria, polydipsia    Lab Results   Component Value Date    TSH 7.610 (H) 11/25/2020    TSH 1.729 04/13/2020    TSH 7.647 (H) 12/23/2019    FREET4 1.00 11/25/2020    FREET4 0.99 12/23/2019    FREET4 0.87 09/17/2019       Reviewed past surgical, medical, family, social history and updated as appropriate.    Review of Systems   Constitutional: Negative for activity change and unexpected weight change (Weight gain).   HENT: Negative for sore throat and voice change.    Eyes: Negative for visual disturbance.   Respiratory: Negative for shortness of breath.    Cardiovascular: Negative for chest pain.   Gastrointestinal: Positive for constipation. Negative for abdominal pain,  diarrhea, nausea and vomiting.   Genitourinary: Negative for urgency.   Musculoskeletal: Negative for arthralgias.   Skin: Negative for wound.   Neurological: Negative for headaches.   Psychiatric/Behavioral: Negative for confusion and sleep disturbance.       Objective:   /85 (BP Location: Right arm, Patient Position: Sitting, BP Method: X-Large (Automatic))   Pulse 61   Temp 97.6 °F (36.4 °C) (Temporal)   Wt 83.9 kg (185 lb)   BMI 32.77 kg/m²     Body mass index is 32.77 kg/m².    Physical Exam  Vitals signs and nursing note reviewed.   Constitutional:       General: She is not in acute distress.     Appearance: She is well-developed. She is obese.   HENT:      Head: Normocephalic and atraumatic.      Right Ear: External ear normal.      Left Ear: External ear normal.      Nose: Nose normal.   Eyes:      General: No scleral icterus.     Conjunctiva/sclera: Conjunctivae normal.   Neck:      Musculoskeletal: Normal range of motion and neck supple. No neck rigidity.      Thyroid: No thyromegaly.      Trachea: No tracheal deviation.      Comments: No thyroid enlargement noted on exam  Cardiovascular:      Rate and Rhythm: Normal rate.      Heart sounds: Normal heart sounds. No murmur.   Pulmonary:      Effort: Pulmonary effort is normal.      Breath sounds: Normal breath sounds.   Abdominal:      Palpations: Abdomen is soft. There is no mass.      Tenderness: There is no abdominal tenderness.   Musculoskeletal: Normal range of motion.   Lymphadenopathy:      Cervical: No cervical adenopathy.   Skin:     General: Skin is warm.      Findings: Rash (Skin rash noted on both arms) present.   Neurological:      Mental Status: She is alert and oriented to person, place, and time.      Sensory: No sensory deficit.      Coordination: Coordination normal.   Psychiatric:         Judgment: Judgment normal.         Lab Review:   Lab Results   Component Value Date    HGBA1C 5.0 09/18/2019       Lab Results   Component  Value Date    CHOL 173 09/18/2019    HDL 60 09/18/2019    LDLCALC 78.2 09/18/2019    TRIG 174 (H) 09/18/2019    CHOLHDL 34.7 09/18/2019       Lab Results   Component Value Date     06/15/2020    K 4.3 06/15/2020     (H) 06/15/2020    CO2 21 (L) 06/15/2020    GLU 76 06/15/2020    BUN 16 06/15/2020    CREATININE 0.9 06/15/2020    CALCIUM 8.8 06/15/2020    PROT 7.9 06/15/2020    ALBUMIN 3.8 06/15/2020    BILITOT 0.3 06/15/2020    ALKPHOS 82 06/15/2020    AST 57 (H) 06/15/2020    ALT 59 (H) 06/15/2020    ANIONGAP 8 06/15/2020    ESTGFRAFRICA >60 06/15/2020    EGFRNONAA >60 06/15/2020    TSH 7.610 (H) 11/25/2020          Assessment and Plan     Magalis Rosales is a 49 y.o. female here for management of the follow endocrinology disorder:  Hypothyroidism    Hypothyroid  Pt has a known h/o hypothyroidism diagnosed due to Hashimoto, here for evaluation of abnormal TFTs  Last TSH:  7.610 11/20, normal free T4,  TSH was 1.729 on 4/20  Clinically euthyroid, patient reports symptoms of weight gain of 25 lb, chronic constipation   Weight based dose is dosage (1.6 mcg/kg =  132 mcg)    Plan  - reviewed proper technique of taking medication with patient, advised pill organized to keep track of medication   - other etiology etiology could be from medication blocking absorption (Iron?)  - change to name brand Synthroid 200 mcg daily, as patient was on this in the past due to abnormal TFTs  - Advised to take LT4 on an empty stomach with water and to wait 30-45 minutes before eating or taking other medications   - repeat TFTs in 6 weeks  - check celiac      Obesity  With weight gain  BMI of 32.77  Screening for A1c Given polyuria polydipsia  Check CMP      RTC in 3-6 months, we will contact the patient once lab work is reviewed      Brooks Cai MD  Endocrinology- [facility]  12/4/2020      Disclaimer: This note has been generated using voice-recognition software. There may be typographical errors that have  been missed during proof-reading.

## 2020-12-04 NOTE — ASSESSMENT & PLAN NOTE
Pt has a known h/o hypothyroidism diagnosed due to Hashimoto, here for evaluation of abnormal TFTs  Last TSH:  7.610 11/20, normal free T4,  TSH was 1.729 on 4/20  Clinically euthyroid, patient reports symptoms of weight gain of 25 lb, chronic constipation   Weight based dose is dosage (1.6 mcg/kg =  132 mcg)    Plan  - reviewed proper technique of taking medication with patient, advised pill organized to keep track of medication   - other etiology etiology could be from medication blocking absorption (Iron?)  - change to name brand Synthroid 200 mcg daily, as patient was on this in the past due to abnormal TFTs  - Advised to take LT4 on an empty stomach with water and to wait 30-45 minutes before eating or taking other medications   - repeat TFTs in 6 weeks  - check celiac

## 2020-12-05 ENCOUNTER — PATIENT MESSAGE (OUTPATIENT)
Dept: FAMILY MEDICINE | Facility: CLINIC | Age: 50
End: 2020-12-05

## 2020-12-06 ENCOUNTER — PATIENT MESSAGE (OUTPATIENT)
Dept: OBSTETRICS AND GYNECOLOGY | Facility: CLINIC | Age: 50
End: 2020-12-06

## 2020-12-06 ENCOUNTER — OFFICE VISIT (OUTPATIENT)
Dept: URGENT CARE | Facility: CLINIC | Age: 50
End: 2020-12-06
Payer: COMMERCIAL

## 2020-12-06 VITALS
SYSTOLIC BLOOD PRESSURE: 137 MMHG | OXYGEN SATURATION: 100 % | DIASTOLIC BLOOD PRESSURE: 91 MMHG | BODY MASS INDEX: 32.78 KG/M2 | HEART RATE: 72 BPM | WEIGHT: 185 LBS | RESPIRATION RATE: 17 BRPM | HEIGHT: 63 IN | TEMPERATURE: 98 F

## 2020-12-06 DIAGNOSIS — R10.31 RIGHT LOWER QUADRANT ABDOMINAL PAIN: ICD-10-CM

## 2020-12-06 DIAGNOSIS — G43.909 MIGRAINE WITHOUT STATUS MIGRAINOSUS, NOT INTRACTABLE, UNSPECIFIED MIGRAINE TYPE: Primary | ICD-10-CM

## 2020-12-06 LAB
B-HCG UR QL: NEGATIVE
BILIRUB UR QL STRIP: POSITIVE
CTP QC/QA: YES
CTP QC/QA: YES
GLUCOSE UR QL STRIP: NEGATIVE
KETONES UR QL STRIP: NEGATIVE
LEUKOCYTE ESTERASE UR QL STRIP: NEGATIVE
PH, POC UA: 6 (ref 5–8)
POC BLOOD, URINE: NEGATIVE
POC NITRATES, URINE: NEGATIVE
PROT UR QL STRIP: NEGATIVE
SARS-COV-2 RDRP RESP QL NAA+PROBE: NEGATIVE
SP GR UR STRIP: 1.02 (ref 1–1.03)
UROBILINOGEN UR STRIP-ACNC: ABNORMAL (ref 0.1–1.1)

## 2020-12-06 PROCEDURE — U0002: ICD-10-PCS | Mod: QW,S$GLB,, | Performed by: NURSE PRACTITIONER

## 2020-12-06 PROCEDURE — 96372 PR INJECTION,THERAP/PROPH/DIAG2ST, IM OR SUBCUT: ICD-10-PCS | Mod: S$GLB,,, | Performed by: NURSE PRACTITIONER

## 2020-12-06 PROCEDURE — 81003 POCT URINALYSIS, DIPSTICK, AUTOMATED, W/O SCOPE: ICD-10-PCS | Mod: QW,S$GLB,, | Performed by: NURSE PRACTITIONER

## 2020-12-06 PROCEDURE — 96372 THER/PROPH/DIAG INJ SC/IM: CPT | Mod: S$GLB,,, | Performed by: NURSE PRACTITIONER

## 2020-12-06 PROCEDURE — 3008F PR BODY MASS INDEX (BMI) DOCUMENTED: ICD-10-PCS | Mod: CPTII,S$GLB,, | Performed by: NURSE PRACTITIONER

## 2020-12-06 PROCEDURE — U0002 COVID-19 LAB TEST NON-CDC: HCPCS | Mod: QW,S$GLB,, | Performed by: NURSE PRACTITIONER

## 2020-12-06 PROCEDURE — 81003 URINALYSIS AUTO W/O SCOPE: CPT | Mod: QW,S$GLB,, | Performed by: NURSE PRACTITIONER

## 2020-12-06 PROCEDURE — 3008F BODY MASS INDEX DOCD: CPT | Mod: CPTII,S$GLB,, | Performed by: NURSE PRACTITIONER

## 2020-12-06 PROCEDURE — 99214 OFFICE O/P EST MOD 30 MIN: CPT | Mod: 25,S$GLB,, | Performed by: NURSE PRACTITIONER

## 2020-12-06 PROCEDURE — 99214 PR OFFICE/OUTPT VISIT, EST, LEVL IV, 30-39 MIN: ICD-10-PCS | Mod: 25,S$GLB,, | Performed by: NURSE PRACTITIONER

## 2020-12-06 RX ORDER — KETOROLAC TROMETHAMINE 30 MG/ML
30 INJECTION, SOLUTION INTRAMUSCULAR; INTRAVENOUS
Status: COMPLETED | OUTPATIENT
Start: 2020-12-06 | End: 2020-12-06

## 2020-12-06 RX ADMIN — KETOROLAC TROMETHAMINE 30 MG: 30 INJECTION, SOLUTION INTRAMUSCULAR; INTRAVENOUS at 11:12

## 2020-12-06 NOTE — PATIENT INSTRUCTIONS
Please follow up with your Primary care provider within 3-5 days if your signs and symptoms have not resolved or worsen.     Follow up with OBGYN as soon as possible for further evaluation and treatment of your abdominal pain.    If your condition worsens or fails to improve we recommend that you receive another evaluation at the emergency room immediately or contact your primary medical clinic to discuss your concerns.     You must understand that you have received an Urgent Care treatment only and that you may be released before all of your medical problems are known or treated. You, the patient, will arrange for follow up care as instructed.     You received an injection of a powerful NSAID today (Toradol).  Its effects will last up to 24 hours.  Please do not take another NSAID (ie aspirin, ibuprofen, Aleve, Advil or Motrin) until this time tomorrow.  If you continue to have pain, you may take Tylenol (acetaminophen) if you are not allergic to this medication.      Migraine Headache  This often severe type of headache is different from other types of headaches in that symptoms other than pain occur with the headache. Nausea and vomiting, lightheadedness, sensitivity to light (photophobia), and other visual disturbances are common migraine symptoms. The pain may last from a few hours to several days. It is not clear why migraines occur but certain factors called triggers can raise the risk of having a migraine attack. A migraine may be triggered by emotional stress or depression, or by hormone changes during the menstrual cycle. Other triggers include birth control pills, overuse of migraine medicines, alcohol or caffeine, foods with tyramine (such as aged cheese and wine), eyestrain, weather changes, missed meals, or too little or too much sleep.  Home care  Follow these tips when taking care of yourself at home:  · Dont drive yourself home if you were given pain medicine for your headache or are having  visual symptoms. Instead, have someone else drive you home. Try to sleep when you get home. You should feel much better when you wake up.  · Cold can help ease migraine symptoms. Put an ice pack on your forehead or at the base of your skull. Put heat on the back of your neck to help ease any neck spasm.  · Drink only clear liquids or eat a light diet until your symptoms get better. This will help you avoid nausea and vomiting.  How to prevent migraines  Pay attention to what seems to trigger your headache. Try to avoid the triggers when you can. If you have frequent headaches, consider keeping a headache diary. In it, write down what you were doing, feeling, or eating in the hours before each headache. Show this to your healthcare provider to help find the cause of your headaches.  If stress seems to be a trigger for your headaches, figure out what is causing stress in your life. Learn new ways to handle your stress. Ideas include regular exercise, biofeedback, self-hypnosis, yoga, and meditation. Talk with your healthcare provider to find out more information about managing stress. Many books and digital media are also available on this subject.  Tyramine is a substance found in many foods. It can trigger a migraine in some people. These foods contain tyramine:  · Chocolate  · Yogurt  · All cheeses, but especially aged cheeses  · Smoked or pickled fish and meat, including herring, caviar, bologna, pepperoni, and salami  · Liver  · Avocados  · Bananas  · Figs  · Raisins  · Red wine  Try staying away from these foods for 1 to 2 months to see if you have fewer headaches.  How to treat future headaches  · Take time out at the first sign of a headache, if possible. Find a quiet, dark, comfortable place to sit or lie down. Let yourself relax or sleep.  · Put an ice pack on your forehead or on the area of greatest pain. A heating pad and massage may help if you are having a muscle spasm and tightness in your neck.  · If  you have been prescribed a medicine to stop a migraine headache, use this at the first warning sign of the headache for best results. First signs may be an aura or pain.  · If you need to take medicine often for your migraine, talk with your healthcare provider about other ways to prevent your headaches.  Follow-up care  Follow up with your healthcare provider, or as advised. Talk with your provider if you have frequent headaches. He or she can figure out a treatment plan. Ask if you can have medicine to take at home the next time you get a bad headache. This may keep you from having to visit the emergency department in the future. You may need to see a headache specialist (neurologist) if you continue to have headaches.  When to seek medical advice  Call your healthcare provider right away if any of these occur:  · Your head pain gets worse, or doesnt get better within 24 hours  · You cant keep liquids down (repeated vomiting)  · Pain in your sinuses, ears, or throat  · Fever of 100.4º F (38º C) or higher, or as directed by your healthcare provider  · Stiff neck  · Extreme drowsiness, confusion, or fainting  · Dizziness, or dizziness with spinning sensation (vertigo)  · Weakness in an arm or leg, or on one side of your face  · Difficulty talking or seeing  Date Last Reviewed: 8/1/2016 © 2000-2017 [employer]. [address] [place] [address], [place] [address]. All rights reserved. This information is not intended as a substitute for professional medical care. Always follow your healthcare professional's instructions.        Abdominal Pain    Abdominal pain is pain in the stomach or belly area. Everyone has this pain from time to time. In many cases it goes away on its own. But abdominal pain can sometimes be due to a serious problem, such as appendicitis. So its important to know when to seek help.  Causes of abdominal pain  There are many possible causes of abdominal pain. Common causes in adults  include:  · Constipation, diarrhea, or gas  · Stomach acid flowing back up into the esophagus (acid reflux or heartburn)  · Severe acid reflux, called GERD (gastroesophageal reflux disease)  · A sore in the lining of the stomach or small intestine (peptic ulcer)  · Inflammation of the gallbladder, liver, or pancreas  · Gallstones or kidney stones  · Appendicitis   · Intestinal blockage   · An internal organ pushing through a muscle or other tissue (hernia)  · Urinary tract infections  · In women, menstrual cramps, fibroids, or endometriosis  · Inflammation or infection of the intestines  Diagnosing the cause of abdominal pain  Your healthcare provider will do a physical exam help find the cause of your pain. If needed, tests will be ordered. Belly pain has many possible causes. So it can be hard to find the reason for your pain. Giving details about your pain can help. Tell your provider where and when you feel the pain, and what makes it better or worse. Also let your provider know if you have other symptoms such as:  · Fever  · Tiredness  · Upset stomach (nausea)  · Vomiting  · Changes in bathroom habits  Treating abdominal pain  Some causes of pain need emergency medical treatment right away. These include appendicitis or a bowel blockage. Other problems can be treated with rest, fluids, or medicines. Your healthcare provider can give you specific instructions for treatment or self-care based on what is causing your pain.  If you have vomiting or diarrhea, sip water or other clear fluids. When you are ready to eat solid foods again, start with small amounts of easy-to-digest, low-fat foods. These include apple sauce, toast, or crackers.   When to seek medical care  Call 911 or go to the hospital right away if you:  · Cant pass stool and are vomiting  · Are vomiting blood or have bloody diarrhea or black, tarry diarrhea  · Have chest, neck, or shoulder pain  · Feel like you might pass out  · Have pain in your  shoulder blades with nausea  · Have sudden, severe belly pain  · Have new, severe pain unlike any you have felt before  · Have a belly that is rigid, hard, and tender to touch  Call your healthcare provider if you have:  · Pain for more than 5 days  · Bloating for more than 2 days  · Diarrhea for more than 5 days  · A fever of 100.4°F (38.0°C) or higher, or as directed by your provider  · Pain that gets worse  · Weight loss for no reason  · Continued lack of appetite  · Blood in your stool  How to prevent abdominal pain  Here are some tips to help prevent abdominal pain:  · Eat smaller amounts of food at one time.  · Avoid greasy, fried, or other high-fat foods.  · Avoid foods that give you gas.  · Exercise regularly.  · Drink plenty of fluids.  To help prevent GERD symptoms:  · Quit smoking.  · Reduce alcohol and certain foods that increase stomach acid.  · Avoid aspirin and over-the-counter pain and fever medicines (NSAIDS or nonsteroidal anti-inflammatory drugs), if possible  · Lose extra weight.  · Finish eating at least 2 hours before you go to bed or lie down.  · Raise the head of your bed.  Date Last Reviewed: 7/1/2016  © 9439-0542 [employer]. [address] [place] [address], [place] [address]. All rights reserved. This information is not intended as a substitute for professional medical care. Always follow your healthcare professional's instructions.

## 2020-12-06 NOTE — PROGRESS NOTES
"Subjective:       Patient ID: Magalis Rosales is a 49 y.o. female.    Vitals:  height is 5' 3" (1.6 m) and weight is 83.9 kg (185 lb). Her temperature is 97.5 °F (36.4 °C). Her blood pressure is 137/91 (abnormal) and her pulse is 72. Her respiration is 17 and oxygen saturation is 100%.     Chief Complaint: Migraine    Patient presents to urgent care with complaints migraine headache and right lower quadrant abdominal pain.    Patient states that her migraine headache has been going on for about 2 days now.  Associated symptoms include nausea without vomiting, photophobia, and phonophobia.  Patient states that she has not been able to do anything all weekend related to pain.  She states that her migraine headache feels exactly like her other migraines.  She has been seen by neurology in the past, has had MRIs.  Currently prescribed Imitrex, has taken with little to no relief over the past 2 days.  Patient also has tried some over-the-counter ibuprofen with minimal relief.    Patient states that she has intermittent right lower quadrant abdominal pain.  Patient states that she has a history of uterine fibroids.  She is followed closely be by her OBGYN.  She has undergone transvaginal ultrasound and D&C for this problem in the past.  Patient states that the only new symptom is that she did start some minimal spotting every 1-3 days.  She has a follow-up appointment with her OBGYN this month.  Patient denies dysuria, urinary frequency, urinary hesitancy, hematuria, vaginal pain, or vaginal discharge.    Migraine   This is a chronic problem. The current episode started in the past 7 days. The problem occurs intermittently. The problem has been waxing and waning. The pain is located in the frontal region. The pain does not radiate. The pain quality is similar to prior headaches. The quality of the pain is described as aching and throbbing. The pain is at a severity of 10/10. The pain is severe. Associated symptoms include " abdominal pain. Pertinent negatives include no blurred vision, dizziness, eye pain, fever, loss of balance, nausea, neck pain, photophobia, tinnitus, vomiting or weakness. The symptoms are aggravated by activity and bright light. The treatment provided no relief. Her past medical history is significant for migraine headaches.       Constitution: Negative for chills, sweating and fever.   HENT: Negative for tinnitus, facial swelling, congestion and sinus pain.    Neck: Negative for neck pain and neck stiffness.   Eyes: Negative for eye pain, photophobia, vision loss, double vision and blurred vision.   Gastrointestinal: Positive for abdominal pain. Negative for nausea and vomiting.   Genitourinary: Negative for missed menses.   Musculoskeletal: Negative for trauma and muscle ache.   Skin: Negative for rash, wound and lesion.   Neurological: Positive for headaches and history of migraines. Negative for dizziness, history of vertigo, light-headedness, facial drooping, speech difficulty, coordination disturbances, loss of balance, disorientation and loss of consciousness.   Psychiatric/Behavioral: Negative for disorientation, confusion, nervous/anxious, sleep disturbance and depression. The patient is not nervous/anxious.        Objective:      Physical Exam   Constitutional: She is oriented to person, place, and time. She appears well-developed.  Non-toxic appearance. She does not appear ill. No distress.   HENT:   Head: Normocephalic and atraumatic.   Ears:   Right Ear: Hearing, tympanic membrane, external ear and ear canal normal.   Left Ear: Hearing, external ear and ear canal normal. There is cerumen present.   Nose: Nose normal. No mucosal edema, rhinorrhea or nasal deformity. No epistaxis. Right sinus exhibits no maxillary sinus tenderness and no frontal sinus tenderness. Left sinus exhibits no maxillary sinus tenderness and no frontal sinus tenderness.   Mouth/Throat: Uvula is midline, oropharynx is clear and  moist and mucous membranes are normal. No trismus in the jaw. Normal dentition. No uvula swelling. No posterior oropharyngeal erythema.   Eyes: Pupils are equal, round, and reactive to light. Conjunctivae, EOM and lids are normal. No scleral icterus.   Neck: Trachea normal, normal range of motion, full passive range of motion without pain and phonation normal. Neck supple. No neck rigidity.   Cardiovascular: Normal rate, regular rhythm, normal heart sounds and normal pulses.   Pulmonary/Chest: Effort normal and breath sounds normal. No respiratory distress. She has no decreased breath sounds. She has no wheezes. She has no rhonchi. She has no rales.   Abdominal: Soft. Normal appearance and bowel sounds are normal. She exhibits no distension and no abdominal bruit. There is abdominal tenderness in the right lower quadrant. There is no rebound, no guarding, no left CVA tenderness, negative psoas sign, no right CVA tenderness and negative obturator sign.      Comments: Abdomen appears soft, non-distended  No signs of trauma or bruising  Bowel sounds normoactive x 4 quad  No aortic bruit auscultated or palpated, no pulsatile mass noted  No tenderness of guarding noted upon palpation  No rebound tenderness  Negative Wright's sign  Negative McBurney's point  Negative psoas sign  Negative obturator sign  Negative Rovsing sign     Musculoskeletal: Normal range of motion.         General: No deformity.   Neurological: She is alert and oriented to person, place, and time. No cranial nerve deficit. She exhibits normal muscle tone. Coordination normal.   Skin: Skin is warm, dry, intact, not diaphoretic and not pale. Psychiatric: Her speech is normal and behavior is normal. Judgment and thought content normal.   Nursing note and vitals reviewed.    Results for orders placed or performed in visit on 12/06/20   POCT COVID-19 Rapid Screening   Result Value Ref Range    POC Rapid COVID Negative Negative     Acceptable  Yes    POCT Urinalysis, Dipstick, Automated, W/O Scope   Result Value Ref Range    POC Blood, Urine Negative Negative    POC Bilirubin, Urine Positive (A) Negative    POC Urobilinogen, Urine norm 0.1 - 1.1    POC Ketones, Urine Negative Negative    POC Protein, Urine Negative Negative    POC Nitrates, Urine Negative Negative    POC Glucose, Urine Negative Negative    pH, UA 6.0 5 - 8    POC Specific Gravity, Urine 1.025 1.003 - 1.029    POC Leukocytes, Urine Negative Negative   POCT urine pregnancy   Result Value Ref Range    POC Preg Test, Ur Negative Negative     Acceptable Yes            Assessment:       1. Migraine without status migrainosus, not intractable, unspecified migraine type    2. Right lower quadrant abdominal pain        Plan:         Migraine without status migrainosus, not intractable, unspecified migraine type  -     POCT COVID-19 Rapid Screening  -     POCT Urinalysis, Dipstick, Automated, W/O Scope  -     ketorolac injection 30 mg  -     Discussed home care and OTC medications for symptomatic relief  -     Discussed f/u if symptoms persist or worsen      Right lower quadrant abdominal pain  -     POCT urine pregnancy         Patient with bilirubin noted upon UA.  AST and ALT elevated upon last CMP.  Patient's Hep C panel negative from 11/25/20.  Advised to follow up with PCP.  Given patient's UA and UPT negative, along with negative CVA tenderness or systemic symptoms patient encouraged to follow up with OBGYN for ongoing RLQ abdominal pain.  Likely related to uterine fibroid as she states that it is on her right side.  Abdominal x-ray low yield given patient is not experiencing any urinary or bowel complaints.  Discussed follow up if symptoms persist.  Stressed ER precautions for persistent migraine or worsening abdominal pain.    Patient Instructions       Please follow up with your Primary care provider within 3-5 days if your signs and symptoms have not resolved or worsen.      Follow up with OBGYN as soon as possible for further evaluation and treatment of your abdominal pain.    If your condition worsens or fails to improve we recommend that you receive another evaluation at the emergency room immediately or contact your primary medical clinic to discuss your concerns.     You must understand that you have received an Urgent Care treatment only and that you may be released before all of your medical problems are known or treated. You, the patient, will arrange for follow up care as instructed.     You received an injection of a powerful NSAID today (Toradol).  Its effects will last up to 24 hours.  Please do not take another NSAID (ie aspirin, ibuprofen, Aleve, Advil or Motrin) until this time tomorrow.  If you continue to have pain, you may take Tylenol (acetaminophen) if you are not allergic to this medication.      Migraine Headache  This often severe type of headache is different from other types of headaches in that symptoms other than pain occur with the headache. Nausea and vomiting, lightheadedness, sensitivity to light (photophobia), and other visual disturbances are common migraine symptoms. The pain may last from a few hours to several days. It is not clear why migraines occur but certain factors called triggers can raise the risk of having a migraine attack. A migraine may be triggered by emotional stress or depression, or by hormone changes during the menstrual cycle. Other triggers include birth control pills, overuse of migraine medicines, alcohol or caffeine, foods with tyramine (such as aged cheese and wine), eyestrain, weather changes, missed meals, or too little or too much sleep.  Home care  Follow these tips when taking care of yourself at home:  · Dont drive yourself home if you were given pain medicine for your headache or are having visual symptoms. Instead, have someone else drive you home. Try to sleep when you get home. You should feel much better when  you wake up.  · Cold can help ease migraine symptoms. Put an ice pack on your forehead or at the base of your skull. Put heat on the back of your neck to help ease any neck spasm.  · Drink only clear liquids or eat a light diet until your symptoms get better. This will help you avoid nausea and vomiting.  How to prevent migraines  Pay attention to what seems to trigger your headache. Try to avoid the triggers when you can. If you have frequent headaches, consider keeping a headache diary. In it, write down what you were doing, feeling, or eating in the hours before each headache. Show this to your healthcare provider to help find the cause of your headaches.  If stress seems to be a trigger for your headaches, figure out what is causing stress in your life. Learn new ways to handle your stress. Ideas include regular exercise, biofeedback, self-hypnosis, yoga, and meditation. Talk with your healthcare provider to find out more information about managing stress. Many books and digital media are also available on this subject.  Tyramine is a substance found in many foods. It can trigger a migraine in some people. These foods contain tyramine:  · Chocolate  · Yogurt  · All cheeses, but especially aged cheeses  · Smoked or pickled fish and meat, including herring, caviar, bologna, pepperoni, and salami  · Liver  · Avocados  · Bananas  · Figs  · Raisins  · Red wine  Try staying away from these foods for 1 to 2 months to see if you have fewer headaches.  How to treat future headaches  · Take time out at the first sign of a headache, if possible. Find a quiet, dark, comfortable place to sit or lie down. Let yourself relax or sleep.  · Put an ice pack on your forehead or on the area of greatest pain. A heating pad and massage may help if you are having a muscle spasm and tightness in your neck.  · If you have been prescribed a medicine to stop a migraine headache, use this at the first warning sign of the headache for best  results. First signs may be an aura or pain.  · If you need to take medicine often for your migraine, talk with your healthcare provider about other ways to prevent your headaches.  Follow-up care  Follow up with your healthcare provider, or as advised. Talk with your provider if you have frequent headaches. He or she can figure out a treatment plan. Ask if you can have medicine to take at home the next time you get a bad headache. This may keep you from having to visit the emergency department in the future. You may need to see a headache specialist (neurologist) if you continue to have headaches.  When to seek medical advice  Call your healthcare provider right away if any of these occur:  · Your head pain gets worse, or doesnt get better within 24 hours  · You cant keep liquids down (repeated vomiting)  · Pain in your sinuses, ears, or throat  · Fever of 100.4º F (38º C) or higher, or as directed by your healthcare provider  · Stiff neck  · Extreme drowsiness, confusion, or fainting  · Dizziness, or dizziness with spinning sensation (vertigo)  · Weakness in an arm or leg, or on one side of your face  · Difficulty talking or seeing  Date Last Reviewed: 8/1/2016  © 5387-0697 [employer]. [address] [place] [address], [place] [address]. All rights reserved. This information is not intended as a substitute for professional medical care. Always follow your healthcare professional's instructions.        Abdominal Pain    Abdominal pain is pain in the stomach or belly area. Everyone has this pain from time to time. In many cases it goes away on its own. But abdominal pain can sometimes be due to a serious problem, such as appendicitis. So its important to know when to seek help.  Causes of abdominal pain  There are many possible causes of abdominal pain. Common causes in adults include:  · Constipation, diarrhea, or gas  · Stomach acid flowing back up into the esophagus (acid reflux or heartburn)  · Severe  acid reflux, called GERD (gastroesophageal reflux disease)  · A sore in the lining of the stomach or small intestine (peptic ulcer)  · Inflammation of the gallbladder, liver, or pancreas  · Gallstones or kidney stones  · Appendicitis   · Intestinal blockage   · An internal organ pushing through a muscle or other tissue (hernia)  · Urinary tract infections  · In women, menstrual cramps, fibroids, or endometriosis  · Inflammation or infection of the intestines  Diagnosing the cause of abdominal pain  Your healthcare provider will do a physical exam help find the cause of your pain. If needed, tests will be ordered. Belly pain has many possible causes. So it can be hard to find the reason for your pain. Giving details about your pain can help. Tell your provider where and when you feel the pain, and what makes it better or worse. Also let your provider know if you have other symptoms such as:  · Fever  · Tiredness  · Upset stomach (nausea)  · Vomiting  · Changes in bathroom habits  Treating abdominal pain  Some causes of pain need emergency medical treatment right away. These include appendicitis or a bowel blockage. Other problems can be treated with rest, fluids, or medicines. Your healthcare provider can give you specific instructions for treatment or self-care based on what is causing your pain.  If you have vomiting or diarrhea, sip water or other clear fluids. When you are ready to eat solid foods again, start with small amounts of easy-to-digest, low-fat foods. These include apple sauce, toast, or crackers.   When to seek medical care  Call 911 or go to the hospital right away if you:  · Cant pass stool and are vomiting  · Are vomiting blood or have bloody diarrhea or black, tarry diarrhea  · Have chest, neck, or shoulder pain  · Feel like you might pass out  · Have pain in your shoulder blades with nausea  · Have sudden, severe belly pain  · Have new, severe pain unlike any you have felt before  · Have a belly  that is rigid, hard, and tender to touch  Call your healthcare provider if you have:  · Pain for more than 5 days  · Bloating for more than 2 days  · Diarrhea for more than 5 days  · A fever of 100.4°F (38.0°C) or higher, or as directed by your provider  · Pain that gets worse  · Weight loss for no reason  · Continued lack of appetite  · Blood in your stool  How to prevent abdominal pain  Here are some tips to help prevent abdominal pain:  · Eat smaller amounts of food at one time.  · Avoid greasy, fried, or other high-fat foods.  · Avoid foods that give you gas.  · Exercise regularly.  · Drink plenty of fluids.  To help prevent GERD symptoms:  · Quit smoking.  · Reduce alcohol and certain foods that increase stomach acid.  · Avoid aspirin and over-the-counter pain and fever medicines (NSAIDS or nonsteroidal anti-inflammatory drugs), if possible  · Lose extra weight.  · Finish eating at least 2 hours before you go to bed or lie down.  · Raise the head of your bed.  Date Last Reviewed: 7/1/2016  © 2832-4804 [employer]. [address] [place] [address], [place] [address]. All rights reserved. This information is not intended as a substitute for professional medical care. Always follow your healthcare professional's instructions.

## 2020-12-15 ENCOUNTER — OFFICE VISIT (OUTPATIENT)
Dept: OBSTETRICS AND GYNECOLOGY | Facility: CLINIC | Age: 50
End: 2020-12-15
Payer: COMMERCIAL

## 2020-12-15 VITALS
WEIGHT: 184.94 LBS | SYSTOLIC BLOOD PRESSURE: 122 MMHG | DIASTOLIC BLOOD PRESSURE: 88 MMHG | BODY MASS INDEX: 32.77 KG/M2

## 2020-12-15 DIAGNOSIS — N92.3 IMB (INTERMENSTRUAL BLEEDING): ICD-10-CM

## 2020-12-15 DIAGNOSIS — D21.9 FIBROID: Primary | ICD-10-CM

## 2020-12-15 PROCEDURE — 3008F PR BODY MASS INDEX (BMI) DOCUMENTED: ICD-10-PCS | Mod: CPTII,S$GLB,, | Performed by: OBSTETRICS & GYNECOLOGY

## 2020-12-15 PROCEDURE — 99999 PR PBB SHADOW E&M-EST. PATIENT-LVL III: ICD-10-PCS | Mod: PBBFAC,,, | Performed by: OBSTETRICS & GYNECOLOGY

## 2020-12-15 PROCEDURE — 1126F PR PAIN SEVERITY QUANTIFIED, NO PAIN PRESENT: ICD-10-PCS | Mod: S$GLB,,, | Performed by: OBSTETRICS & GYNECOLOGY

## 2020-12-15 PROCEDURE — 99999 PR PBB SHADOW E&M-EST. PATIENT-LVL III: CPT | Mod: PBBFAC,,, | Performed by: OBSTETRICS & GYNECOLOGY

## 2020-12-15 PROCEDURE — 99213 OFFICE O/P EST LOW 20 MIN: CPT | Mod: S$GLB,,, | Performed by: OBSTETRICS & GYNECOLOGY

## 2020-12-15 PROCEDURE — 3008F BODY MASS INDEX DOCD: CPT | Mod: CPTII,S$GLB,, | Performed by: OBSTETRICS & GYNECOLOGY

## 2020-12-15 PROCEDURE — 1126F AMNT PAIN NOTED NONE PRSNT: CPT | Mod: S$GLB,,, | Performed by: OBSTETRICS & GYNECOLOGY

## 2020-12-15 PROCEDURE — 99213 PR OFFICE/OUTPT VISIT, EST, LEVL III, 20-29 MIN: ICD-10-PCS | Mod: S$GLB,,, | Performed by: OBSTETRICS & GYNECOLOGY

## 2020-12-15 NOTE — PROGRESS NOTES
"SUBJECTIVE:   49 y.o. female   for ER fu    Patient's last menstrual period was 2020 (approximate)..      Stated period last month lasted for 1 month, spotting x 1 month with dark brown vaginal discharge  Menses ended 1 week ago, discharged resolved  Pt with known h/o uterine fibroid  She has AUB-IMB before and was taken to OR for h'scope D&C, with benign pathology  Pt went to ER last month for severe lower abdominal pain with menses. Symptoms now improved but still fell tenderness below umbilicus and felt "swollen"  She never experienced pain like this before     US PELVIS COMP WITH TRANSVAG NON-OB (XPD)     CLINICAL HISTORY:  IMB, fibroids;  Encounter for gynecological examination (general) (routine) with abnormal findings     TECHNIQUE:  Transabdominal and transvaginal pelvic ultrasound.     COMPARISON:  None     FINDINGS:  Uterus: Measures 11.0 x 7.5 x 9.2 cm.  Endometrial echo complex measures 6 mm, unremarkable.  There are numerous uterine fibroids.  Anterior intramural myoma measures 3.1 cm.  Lower uterine segment myoma measures 3.8 cm.     Right ovary: Not visualized.     Left ovary: Measures 3.1 x 2.7 x 1.6 cm.  Appearance is unremarkable.  Blood flow is present.     Miscellaneous: No free fluid.     Impression:     Leiomyomatous uterus.        Electronically signed by: Renny Xiao MD  Date:                                            2020  Time:                                           15:01    OB History    Para Term  AB Living   1 1 1     1   SAB TAB Ectopic Multiple Live Births           1      # Outcome Date GA Lbr Ryley/2nd Weight Sex Delivery Anes PTL Lv   1 Term               Obstetric Comments   Gynhx;  Reg   Not currently sexually active   Uterine fibroid x years - no symptoms   H/o abnormal pap, s/p laser in ,    Pap 2020 NEG/other HR HPV positive   [ ] repeat pap 2021   MMG 2018 NEG     Past Medical History:   Diagnosis Date    History of psychiatric " care     History of psychiatric hospitalization     Hypothyroidism     Migraines     Psychiatric exam requested by authority     Psychiatric problem     Therapy      Past Surgical History:   Procedure Laterality Date    APPENDECTOMY       SECTION      HYSTEROSCOPY WITH DILATION AND CURETTAGE OF UTERUS N/A 2020    Procedure: HYSTEROSCOPY, WITH DILATION AND CURETTAGE OF UTERUS;  Surgeon: Nesha Skaggs Mai, MD;  Location: [facility] OR;  Service: OB/GYN;  Laterality: N/A;  RN PRE OP 6- COVID & T&S 2020. [place]  H/P incomplete----UPT    LASER ABLATION OF THE CERVIX       Social History     Socioeconomic History    Marital status: Single     Spouse name: Not on file    Number of children: 1    Years of education: Not on file    Highest education level: Not on file   Occupational History    Occupation: Unemployed - substitute teaching   Social Needs    Financial resource strain: Not on file    Food insecurity     Worry: Not on file     Inability: Not on file    Transportation needs     Medical: Not on file     Non-medical: Not on file   Tobacco Use    Smoking status: Never Smoker    Smokeless tobacco: Never Used   Substance and Sexual Activity    Alcohol use: No    Drug use: No    Sexual activity: Not Currently     Partners: Male   Lifestyle    Physical activity     Days per week: Not on file     Minutes per session: Not on file    Stress: Not on file   Relationships    Social connections     Talks on phone: Not on file     Gets together: Not on file     Attends Rastafari service: Not on file     Active member of club or organization: Not on file     Attends meetings of clubs or organizations: Not on file     Relationship status: Not on file   Other Topics Concern    Patient feels they ought to cut down on drinking/drug use Not Asked    Patient annoyed by others criticizing their drinking/drug use Not Asked    Patient has felt bad or guilty about drinking/drug use Not Asked     Patient has had a drink/used drugs as an eye opener in the AM Not Asked   Social History Narrative    Not on file     Family History   Problem Relation Age of Onset    Hypertension Mother     Hypertension Father     Stroke Father     Ovarian cancer Paternal Grandmother          Current Outpatient Medications   Medication Sig Dispense Refill    chlorhexidine (PERIDEX) 0.12 % solution       docusate sodium (COLACE) 100 MG capsule Take 1 capsule (100 mg total) by mouth 2 (two) times daily. 60 capsule 11    ferrous sulfate (FEOSOL) 325 mg (65 mg iron) Tab tablet Take 1 tablet (325 mg total) by mouth daily with breakfast. 30 tablet 11    fluocinonide (LIDEX) 0.05 % ointment Apply topically 2 (two) times daily. 15 g 0    HYDROcodone-acetaminophen (NORCO) 5-325 mg per tablet Take 1 tablet by mouth every 6 (six) hours as needed for Pain. 10 tablet 0    ibuprofen (ADVIL,MOTRIN) 600 MG tablet Take 1 tablet (600 mg total) by mouth every 6 (six) hours as needed for Pain. 30 tablet 0    levothyroxine (SYNTHROID) 200 MCG tablet Take 1 tablet (200 mcg total) by mouth before breakfast. 30 tablet 11    norgestimate-ethinyl estradioL (ORTHO-CYCLEN) 0.25-35 mg-mcg per tablet Take 1 tablet by mouth once daily. 28 tablet 5    sertraline (ZOLOFT) 100 MG tablet TAKE 1 AND 1/2 TABLETS BY MOUTH EVERY DAY  135 tablet 1    sumatriptan (IMITREX) 50 MG tablet One tab po at onset of headache may take second pill after 2 hours if no improvement not to exceed two tablets in 24hours 6 tablet 3    topiramate (TOPAMAX) 100 MG tablet Take 1 tablet (100 mg total) by mouth 2 (two) times daily. 60 tablet 0    triamcinolone acetonide 0.1% (KENALOG) 0.1 % ointment Apply topically 2 (two) times daily. 30 g 0     No current facility-administered medications for this visit.      Allergies: Patient has no known allergies.       ROS:  GENERAL: Denies weight gain or weight loss. Feeling well overall.   SKIN: Denies rash or lesions.   HEAD:  Denies head injury or headache.   NODES: Denies enlarged lymph nodes.   CHEST: Denies chest pain or shortness of breath.   CARDIOVASCULAR: Denies palpitations or left sided chest pain.   ABDOMEN: No abdominal pain, constipation, diarrhea, nausea, vomiting or rectal bleeding.   URINARY: No frequency, dysuria, hematuria, or burning on urination.  REPRODUCTIVE: see HPI  BREASTS: The patient performs breast self-examination and denies pain, lumps, or nipple discharge.   HEMATOLOGIC: No easy bruisability or excessive bleeding.  MUSCULOSKELETAL: Denies joint pain or swelling.   NEUROLOGIC: Denies syncope or weakness.   PSYCHIATRIC: Denies depression, anxiety or mood swings.      OBJECTIVE:   /88   Wt 83.9 kg (184 lb 15.5 oz)   LMP 12/01/2020 (Approximate)   BMI 32.77 kg/m²   The patient appears well, alert, oriented x 3, in no distress.  NECK: negative, no thyromegaly, trachea midline  SKIN: normal, good color, good turgor and no acne, striae, hirsutism  BREAST EXAM: breasts appear normal, no suspicious masses, no skin or nipple changes or axillary nodes  ABDOMEN: no hernias, masses, or hepatosplenomegaly  BLADDER: soft  GENITALIA: normal external genitalia, no erythema, no discharge  URETHRA: normal appearing urethra with no masses, tenderness or lesions and normal urethra, normal urethral meatus  VAGINA: Normal, small vaginal caliber  CERVIX: no lesions or cervical motion tenderness, cervix appears stenotic  UTERUS: enlarged, 14-15 weeks size, + fibroids,  limited mobility.   ADNEXA: no mass, fullness, tenderness      ASSESSMENT:   1.  Aub-HMB/IMB, pelvic pain: discussed given pt with symptomatic uterine fibroid - discussed hysterectomy.  Discussed TLH vs. LEON - given her uterine size and small vaginal caliber, I suggest LEON.  Will check pelvic US for fibroid size again.

## 2020-12-17 ENCOUNTER — HOSPITAL ENCOUNTER (OUTPATIENT)
Dept: RADIOLOGY | Facility: HOSPITAL | Age: 50
Discharge: HOME | End: 2020-12-17
Attending: OBSTETRICS & GYNECOLOGY
Payer: COMMERCIAL

## 2020-12-17 DIAGNOSIS — D21.9 FIBROID: ICD-10-CM

## 2020-12-17 DIAGNOSIS — N92.3 IMB (INTERMENSTRUAL BLEEDING): ICD-10-CM

## 2020-12-17 PROCEDURE — 76856 US PELVIS COMP WITH TRANSVAG NON-OB (XPD): ICD-10-PCS | Mod: 26,,, | Performed by: RADIOLOGY

## 2020-12-17 PROCEDURE — 76830 TRANSVAGINAL US NON-OB: CPT | Mod: 26,,, | Performed by: RADIOLOGY

## 2020-12-17 PROCEDURE — 76856 US EXAM PELVIC COMPLETE: CPT | Mod: 26,,, | Performed by: RADIOLOGY

## 2020-12-17 PROCEDURE — 76830 TRANSVAGINAL US NON-OB: CPT | Mod: TC

## 2020-12-17 PROCEDURE — 76830 US PELVIS COMP WITH TRANSVAG NON-OB (XPD): ICD-10-PCS | Mod: 26,,, | Performed by: RADIOLOGY

## 2020-12-18 ENCOUNTER — PATIENT MESSAGE (OUTPATIENT)
Dept: OBSTETRICS AND GYNECOLOGY | Facility: CLINIC | Age: 50
End: 2020-12-18

## 2020-12-19 ENCOUNTER — PATIENT MESSAGE (OUTPATIENT)
Dept: OBSTETRICS AND GYNECOLOGY | Facility: CLINIC | Age: 50
End: 2020-12-19

## 2020-12-21 ENCOUNTER — PATIENT MESSAGE (OUTPATIENT)
Dept: OBSTETRICS AND GYNECOLOGY | Facility: CLINIC | Age: 50
End: 2020-12-21

## 2020-12-22 ENCOUNTER — PATIENT MESSAGE (OUTPATIENT)
Dept: OBSTETRICS AND GYNECOLOGY | Facility: CLINIC | Age: 50
End: 2020-12-22

## 2020-12-23 ENCOUNTER — TELEPHONE (OUTPATIENT)
Dept: OBSTETRICS AND GYNECOLOGY | Facility: CLINIC | Age: 50
End: 2020-12-23

## 2020-12-23 NOTE — TELEPHONE ENCOUNTER
----- Message from Wilbert Garcia sent at 12/21/2020  9:46 AM CST -----  Regarding: Test Results  Contact: Patient  Type:  Test Results    Who Called: Patient    Name of Test (Lab/Mammo/Etc): US    Date of Test: 12/17/2020    Ordering Provider: CLARENCE Coates    Where the test was performed: [facility]    Would the patient rather a call back or a response via [facility]? Call    Best Call Back Number:  [phone] (home)     Additional Information:

## 2020-12-23 NOTE — TELEPHONE ENCOUNTER
----- Message from Roberta Mendez sent at 12/21/2020  3:15 PM CST -----  Type: Patient Call Back    Who called: self    What is the request in detail: patient would like to speak with Dr Coates concerning her ultrasound results. Please call    Can the clinic reply by [facility]? no    Would the patient rather a call back or a response via [facility]? call    Best call back number:.[phone] or [phone]

## 2020-12-23 NOTE — TELEPHONE ENCOUNTER
----- Message from Chanel Garcia sent at 12/18/2020 11:09 AM CST -----  Type:  Test Results    Who Called:  Self     Name of Test (Lab/Mammo/Etc): Ultrasound     Date of Test: 12/17/2020    Ordering Provider:  Dr. Coates    Where the test was performed:  [facility]     Would the patient rather a call back or a response via [facility]?  Call     Best Call Back Number: .[phone] (home)       For Clinical Team:Has the provider reviewed the results?

## 2020-12-23 NOTE — TELEPHONE ENCOUNTER
Called patient and reviewed option of definitive surgical treatment with hysterectomy versus hormonal contraception.  At this time, she would like to proceed with hysterectomy.   Will schedule case.

## 2020-12-28 ENCOUNTER — PATIENT MESSAGE (OUTPATIENT)
Dept: OBSTETRICS AND GYNECOLOGY | Facility: CLINIC | Age: 50
End: 2020-12-28

## 2020-12-28 DIAGNOSIS — D25.9 UTERINE LEIOMYOMA, UNSPECIFIED LOCATION: Primary | ICD-10-CM

## 2020-12-28 RX ORDER — NAPROXEN 500 MG/1
500 TABLET ORAL 2 TIMES DAILY WITH MEALS
Qty: 60 TABLET | Refills: 2 | Status: SHIPPED | OUTPATIENT
Start: 2020-12-28 | End: 2021-12-28

## 2020-12-29 DIAGNOSIS — G43.829 MENSTRUAL MIGRAINE WITHOUT STATUS MIGRAINOSUS, NOT INTRACTABLE: ICD-10-CM

## 2020-12-29 RX ORDER — SUMATRIPTAN 50 MG/1
TABLET, FILM COATED ORAL
Qty: 18 TABLET | Refills: 3 | Status: SHIPPED | OUTPATIENT
Start: 2020-12-29 | End: 2021-01-23 | Stop reason: SDUPTHER

## 2021-01-09 DIAGNOSIS — G43.829 MENSTRUAL MIGRAINE WITHOUT STATUS MIGRAINOSUS, NOT INTRACTABLE: ICD-10-CM

## 2021-01-09 RX ORDER — SUMATRIPTAN 50 MG/1
TABLET, FILM COATED ORAL
Qty: 18 TABLET | Refills: 3 | Status: CANCELLED | OUTPATIENT
Start: 2021-01-09

## 2021-01-14 ENCOUNTER — PATIENT MESSAGE (OUTPATIENT)
Dept: OBSTETRICS AND GYNECOLOGY | Facility: CLINIC | Age: 51
End: 2021-01-14

## 2021-01-20 ENCOUNTER — PATIENT OUTREACH (OUTPATIENT)
Dept: ADMINISTRATIVE | Facility: OTHER | Age: 51
End: 2021-01-20

## 2021-01-20 ENCOUNTER — OFFICE VISIT (OUTPATIENT)
Dept: OBSTETRICS AND GYNECOLOGY | Facility: CLINIC | Age: 51
End: 2021-01-20
Payer: COMMERCIAL

## 2021-01-20 VITALS
SYSTOLIC BLOOD PRESSURE: 122 MMHG | WEIGHT: 179.44 LBS | DIASTOLIC BLOOD PRESSURE: 84 MMHG | BODY MASS INDEX: 31.79 KG/M2

## 2021-01-20 DIAGNOSIS — Z01.818 PREOP EXAMINATION: Primary | ICD-10-CM

## 2021-01-20 PROCEDURE — 3008F PR BODY MASS INDEX (BMI) DOCUMENTED: ICD-10-PCS | Mod: CPTII,S$GLB,, | Performed by: OBSTETRICS & GYNECOLOGY

## 2021-01-20 PROCEDURE — 99999 PR PBB SHADOW E&M-EST. PATIENT-LVL III: CPT | Mod: PBBFAC,,, | Performed by: OBSTETRICS & GYNECOLOGY

## 2021-01-20 PROCEDURE — 99999 PR PBB SHADOW E&M-EST. PATIENT-LVL III: ICD-10-PCS | Mod: PBBFAC,,, | Performed by: OBSTETRICS & GYNECOLOGY

## 2021-01-20 PROCEDURE — 99499 NO LOS: ICD-10-PCS | Mod: S$GLB,,, | Performed by: OBSTETRICS & GYNECOLOGY

## 2021-01-20 PROCEDURE — 99499 UNLISTED E&M SERVICE: CPT | Mod: S$GLB,,, | Performed by: OBSTETRICS & GYNECOLOGY

## 2021-01-20 PROCEDURE — 3008F BODY MASS INDEX DOCD: CPT | Mod: CPTII,S$GLB,, | Performed by: OBSTETRICS & GYNECOLOGY

## 2021-01-20 RX ORDER — DOCUSATE SODIUM 100 MG/1
100 CAPSULE, LIQUID FILLED ORAL 2 TIMES DAILY
Qty: 60 CAPSULE | Refills: 1 | Status: SHIPPED | OUTPATIENT
Start: 2021-01-20 | End: 2021-02-19

## 2021-01-20 RX ORDER — IBUPROFEN 600 MG/1
600 TABLET ORAL EVERY 6 HOURS PRN
Qty: 30 TABLET | Refills: 1 | Status: SHIPPED | OUTPATIENT
Start: 2021-01-20 | End: 2022-01-14

## 2021-01-20 RX ORDER — HYDROCODONE BITARTRATE AND ACETAMINOPHEN 7.5; 325 MG/1; MG/1
1 TABLET ORAL EVERY 6 HOURS PRN
Qty: 30 TABLET | Refills: 0 | Status: SHIPPED | OUTPATIENT
Start: 2021-01-20 | End: 2021-06-02

## 2021-01-20 RX ORDER — MUPIROCIN 20 MG/G
OINTMENT TOPICAL
Status: CANCELLED | OUTPATIENT
Start: 2021-01-20

## 2021-01-20 RX ORDER — SODIUM CHLORIDE 9 MG/ML
INJECTION, SOLUTION INTRAVENOUS CONTINUOUS
Status: CANCELLED | OUTPATIENT
Start: 2021-01-20

## 2021-01-23 DIAGNOSIS — G43.829 MENSTRUAL MIGRAINE WITHOUT STATUS MIGRAINOSUS, NOT INTRACTABLE: ICD-10-CM

## 2021-01-25 ENCOUNTER — ANESTHESIA EVENT (OUTPATIENT)
Dept: SURGERY | Facility: HOSPITAL | Age: 51
End: 2021-01-25
Payer: COMMERCIAL

## 2021-01-25 ENCOUNTER — HOSPITAL ENCOUNTER (OUTPATIENT)
Dept: PREADMISSION TESTING | Facility: HOSPITAL | Age: 51
Discharge: HOME | End: 2021-01-25
Attending: OBSTETRICS & GYNECOLOGY
Payer: COMMERCIAL

## 2021-01-25 VITALS
BODY MASS INDEX: 32.03 KG/M2 | DIASTOLIC BLOOD PRESSURE: 66 MMHG | OXYGEN SATURATION: 100 % | WEIGHT: 180.75 LBS | HEIGHT: 63 IN | TEMPERATURE: 98 F | SYSTOLIC BLOOD PRESSURE: 108 MMHG | RESPIRATION RATE: 16 BRPM | HEART RATE: 62 BPM

## 2021-01-25 DIAGNOSIS — Z01.818 PREOP EXAMINATION: ICD-10-CM

## 2021-01-25 DIAGNOSIS — Z01.818 PREOP TESTING: ICD-10-CM

## 2021-01-25 LAB
ABO + RH BLD: NORMAL
ALBUMIN SERPL BCP-MCNC: 3.6 G/DL (ref 3.5–5.2)
ALP SERPL-CCNC: 77 U/L (ref 55–135)
ALT SERPL W/O P-5'-P-CCNC: 20 U/L (ref 10–44)
ANION GAP SERPL CALC-SCNC: 7 MMOL/L (ref 8–16)
AST SERPL-CCNC: 22 U/L (ref 10–40)
BASOPHILS # BLD AUTO: 0.06 K/UL (ref 0–0.2)
BASOPHILS NFR BLD: 0.7 % (ref 0–1.9)
BILIRUB SERPL-MCNC: 0.3 MG/DL (ref 0.1–1)
BLD GP AB SCN CELLS X3 SERPL QL: NORMAL
BUN SERPL-MCNC: 11 MG/DL (ref 6–20)
CALCIUM SERPL-MCNC: 8.8 MG/DL (ref 8.7–10.5)
CHLORIDE SERPL-SCNC: 109 MMOL/L (ref 95–110)
CO2 SERPL-SCNC: 24 MMOL/L (ref 23–29)
CREAT SERPL-MCNC: 0.9 MG/DL (ref 0.5–1.4)
DIFFERENTIAL METHOD: NORMAL
EOSINOPHIL # BLD AUTO: 0 K/UL (ref 0–0.5)
EOSINOPHIL NFR BLD: 0.5 % (ref 0–8)
ERYTHROCYTE [DISTWIDTH] IN BLOOD BY AUTOMATED COUNT: 12.6 % (ref 11.5–14.5)
EST. GFR  (AFRICAN AMERICAN): >60 ML/MIN/1.73 M^2
EST. GFR  (NON AFRICAN AMERICAN): >60 ML/MIN/1.73 M^2
GLUCOSE SERPL-MCNC: 77 MG/DL (ref 70–110)
HCG INTACT+B SERPL-ACNC: <1.2 MIU/ML
HCT VFR BLD AUTO: 41.5 % (ref 37–48.5)
HGB BLD-MCNC: 13.3 G/DL (ref 12–16)
IMM GRANULOCYTES # BLD AUTO: 0.02 K/UL (ref 0–0.04)
IMM GRANULOCYTES NFR BLD AUTO: 0.2 % (ref 0–0.5)
LYMPHOCYTES # BLD AUTO: 2.2 K/UL (ref 1–4.8)
LYMPHOCYTES NFR BLD: 24.8 % (ref 18–48)
MCH RBC QN AUTO: 28.6 PG (ref 27–31)
MCHC RBC AUTO-ENTMCNC: 32 G/DL (ref 32–36)
MCV RBC AUTO: 89 FL (ref 82–98)
MONOCYTES # BLD AUTO: 0.6 K/UL (ref 0.3–1)
MONOCYTES NFR BLD: 6.7 % (ref 4–15)
NEUTROPHILS # BLD AUTO: 5.9 K/UL (ref 1.8–7.7)
NEUTROPHILS NFR BLD: 67.1 % (ref 38–73)
NRBC BLD-RTO: 0 /100 WBC
PLATELET # BLD AUTO: 226 K/UL (ref 150–350)
PMV BLD AUTO: 12 FL (ref 9.2–12.9)
POTASSIUM SERPL-SCNC: 4.2 MMOL/L (ref 3.5–5.1)
PROT SERPL-MCNC: 7.6 G/DL (ref 6–8.4)
RBC # BLD AUTO: 4.65 M/UL (ref 4–5.4)
SARS-COV-2 RDRP RESP QL NAA+PROBE: NEGATIVE
SODIUM SERPL-SCNC: 140 MMOL/L (ref 136–145)
WBC # BLD AUTO: 8.71 K/UL (ref 3.9–12.7)

## 2021-01-25 PROCEDURE — 84702 CHORIONIC GONADOTROPIN TEST: CPT

## 2021-01-25 PROCEDURE — U0002 COVID-19 LAB TEST NON-CDC: HCPCS

## 2021-01-25 PROCEDURE — 86900 BLOOD TYPING SEROLOGIC ABO: CPT

## 2021-01-25 PROCEDURE — 93010 EKG 12-LEAD: ICD-10-PCS | Mod: ,,, | Performed by: INTERNAL MEDICINE

## 2021-01-25 PROCEDURE — 93010 ELECTROCARDIOGRAM REPORT: CPT | Mod: ,,, | Performed by: INTERNAL MEDICINE

## 2021-01-25 PROCEDURE — 36415 COLL VENOUS BLD VENIPUNCTURE: CPT

## 2021-01-25 PROCEDURE — 85025 COMPLETE CBC W/AUTO DIFF WBC: CPT

## 2021-01-25 PROCEDURE — 80053 COMPREHEN METABOLIC PANEL: CPT

## 2021-01-25 PROCEDURE — 93005 ELECTROCARDIOGRAM TRACING: CPT

## 2021-01-25 RX ORDER — SUMATRIPTAN 50 MG/1
TABLET, FILM COATED ORAL
Qty: 18 TABLET | Refills: 3 | Status: SHIPPED | OUTPATIENT
Start: 2021-01-25 | End: 2021-05-10 | Stop reason: SDUPTHER

## 2021-01-25 RX ORDER — TOPIRAMATE 100 MG/1
100 TABLET, FILM COATED ORAL 2 TIMES DAILY
Qty: 60 TABLET | Refills: 2 | Status: SHIPPED | OUTPATIENT
Start: 2021-01-25 | End: 2021-04-25

## 2021-01-26 ENCOUNTER — ANESTHESIA (OUTPATIENT)
Dept: SURGERY | Facility: HOSPITAL | Age: 51
End: 2021-01-26
Payer: COMMERCIAL

## 2021-01-26 ENCOUNTER — HOSPITAL ENCOUNTER (OUTPATIENT)
Facility: HOSPITAL | Age: 51
Discharge: HOME | End: 2021-01-26
Attending: OBSTETRICS & GYNECOLOGY | Admitting: OBSTETRICS & GYNECOLOGY
Payer: COMMERCIAL

## 2021-01-26 VITALS
TEMPERATURE: 98 F | RESPIRATION RATE: 18 BRPM | SYSTOLIC BLOOD PRESSURE: 108 MMHG | BODY MASS INDEX: 32.02 KG/M2 | OXYGEN SATURATION: 97 % | WEIGHT: 180.75 LBS | DIASTOLIC BLOOD PRESSURE: 59 MMHG | HEART RATE: 62 BPM

## 2021-01-26 DIAGNOSIS — Z90.710 S/P LAPAROSCOPIC HYSTERECTOMY: Primary | ICD-10-CM

## 2021-01-26 DIAGNOSIS — Z01.818 PREOP EXAMINATION: ICD-10-CM

## 2021-01-26 DIAGNOSIS — Z01.818 PREOP TESTING: ICD-10-CM

## 2021-01-26 LAB — POCT GLUCOSE: 76 MG/DL (ref 70–110)

## 2021-01-26 PROCEDURE — 88307 TISSUE EXAM BY PATHOLOGIST: CPT | Performed by: PATHOLOGY

## 2021-01-26 PROCEDURE — 88307 TISSUE EXAM BY PATHOLOGIST: CPT | Mod: 26,,, | Performed by: PATHOLOGY

## 2021-01-26 PROCEDURE — C2628 CATHETER, OCCLUSION: HCPCS | Performed by: OBSTETRICS & GYNECOLOGY

## 2021-01-26 PROCEDURE — 36000710: Performed by: OBSTETRICS & GYNECOLOGY

## 2021-01-26 PROCEDURE — 71000039 HC RECOVERY, EACH ADD'L HOUR: Performed by: OBSTETRICS & GYNECOLOGY

## 2021-01-26 PROCEDURE — 63600175 PHARM REV CODE 636 W HCPCS: Performed by: NURSE ANESTHETIST, CERTIFIED REGISTERED

## 2021-01-26 PROCEDURE — D9220A PRA ANESTHESIA: ICD-10-PCS | Mod: CRNA,,, | Performed by: NURSE ANESTHETIST, CERTIFIED REGISTERED

## 2021-01-26 PROCEDURE — 37000009 HC ANESTHESIA EA ADD 15 MINS: Performed by: OBSTETRICS & GYNECOLOGY

## 2021-01-26 PROCEDURE — 88307 PR  SURG PATH,LEVEL V: ICD-10-PCS | Mod: 26,,, | Performed by: PATHOLOGY

## 2021-01-26 PROCEDURE — 58573 TLH W/T/O UTERUS OVER 250 G: CPT | Mod: 80,,, | Performed by: OBSTETRICS & GYNECOLOGY

## 2021-01-26 PROCEDURE — 63600175 PHARM REV CODE 636 W HCPCS: Performed by: OBSTETRICS & GYNECOLOGY

## 2021-01-26 PROCEDURE — 37000008 HC ANESTHESIA 1ST 15 MINUTES: Performed by: OBSTETRICS & GYNECOLOGY

## 2021-01-26 PROCEDURE — 71000016 HC POSTOP RECOV ADDL HR: Performed by: OBSTETRICS & GYNECOLOGY

## 2021-01-26 PROCEDURE — 58573 TLH W/T/O UTERUS OVER 250 G: CPT | Mod: ,,, | Performed by: OBSTETRICS & GYNECOLOGY

## 2021-01-26 PROCEDURE — 36000711: Performed by: OBSTETRICS & GYNECOLOGY

## 2021-01-26 PROCEDURE — D9220A PRA ANESTHESIA: Mod: CRNA,,, | Performed by: NURSE ANESTHETIST, CERTIFIED REGISTERED

## 2021-01-26 PROCEDURE — 25000003 PHARM REV CODE 250: Performed by: OBSTETRICS & GYNECOLOGY

## 2021-01-26 PROCEDURE — D9220A PRA ANESTHESIA: ICD-10-PCS | Mod: ANES,,, | Performed by: ANESTHESIOLOGY

## 2021-01-26 PROCEDURE — 58573 PR LAPAROSCOPY TOT HYSTERECTOMY UTERUS >250 GRAM W TUBE/OVARY: ICD-10-PCS | Mod: 80,,, | Performed by: OBSTETRICS & GYNECOLOGY

## 2021-01-26 PROCEDURE — 27201423 OPTIME MED/SURG SUP & DEVICES STERILE SUPPLY: Performed by: OBSTETRICS & GYNECOLOGY

## 2021-01-26 PROCEDURE — 71000015 HC POSTOP RECOV 1ST HR: Performed by: OBSTETRICS & GYNECOLOGY

## 2021-01-26 PROCEDURE — 71000033 HC RECOVERY, INTIAL HOUR: Performed by: OBSTETRICS & GYNECOLOGY

## 2021-01-26 PROCEDURE — C1782 MORCELLATOR: HCPCS | Performed by: OBSTETRICS & GYNECOLOGY

## 2021-01-26 PROCEDURE — 63600175 PHARM REV CODE 636 W HCPCS: Performed by: ANESTHESIOLOGY

## 2021-01-26 PROCEDURE — 58573 PR LAPAROSCOPY TOT HYSTERECTOMY UTERUS >250 GRAM W TUBE/OVARY: ICD-10-PCS | Mod: ,,, | Performed by: OBSTETRICS & GYNECOLOGY

## 2021-01-26 PROCEDURE — 25000003 PHARM REV CODE 250: Performed by: NURSE ANESTHETIST, CERTIFIED REGISTERED

## 2021-01-26 PROCEDURE — 82962 GLUCOSE BLOOD TEST: CPT | Performed by: OBSTETRICS & GYNECOLOGY

## 2021-01-26 PROCEDURE — D9220A PRA ANESTHESIA: Mod: ANES,,, | Performed by: ANESTHESIOLOGY

## 2021-01-26 RX ORDER — SODIUM CHLORIDE 0.9 % (FLUSH) 0.9 %
10 SYRINGE (ML) INJECTION
Status: DISCONTINUED | OUTPATIENT
Start: 2021-01-26 | End: 2021-01-26 | Stop reason: HOSPADM

## 2021-01-26 RX ORDER — HYDROMORPHONE HYDROCHLORIDE 2 MG/ML
0.2 INJECTION, SOLUTION INTRAMUSCULAR; INTRAVENOUS; SUBCUTANEOUS EVERY 5 MIN PRN
Status: DISCONTINUED | OUTPATIENT
Start: 2021-01-26 | End: 2021-01-26 | Stop reason: HOSPADM

## 2021-01-26 RX ORDER — DIPHENHYDRAMINE HCL 25 MG
25 CAPSULE ORAL EVERY 4 HOURS PRN
Status: DISCONTINUED | OUTPATIENT
Start: 2021-01-26 | End: 2021-01-26 | Stop reason: HOSPADM

## 2021-01-26 RX ORDER — FENTANYL CITRATE 50 UG/ML
INJECTION, SOLUTION INTRAMUSCULAR; INTRAVENOUS
Status: DISCONTINUED | OUTPATIENT
Start: 2021-01-26 | End: 2021-01-26

## 2021-01-26 RX ORDER — DIPHENHYDRAMINE HYDROCHLORIDE 50 MG/ML
25 INJECTION INTRAMUSCULAR; INTRAVENOUS EVERY 4 HOURS PRN
Status: DISCONTINUED | OUTPATIENT
Start: 2021-01-26 | End: 2021-01-26 | Stop reason: HOSPADM

## 2021-01-26 RX ORDER — CEFAZOLIN SODIUM 2 G/50ML
2 SOLUTION INTRAVENOUS
Status: COMPLETED | OUTPATIENT
Start: 2021-01-26 | End: 2021-01-26

## 2021-01-26 RX ORDER — HYDROMORPHONE HYDROCHLORIDE 2 MG/ML
1 INJECTION, SOLUTION INTRAMUSCULAR; INTRAVENOUS; SUBCUTANEOUS EVERY 6 HOURS PRN
Status: DISCONTINUED | OUTPATIENT
Start: 2021-01-26 | End: 2021-01-26 | Stop reason: HOSPADM

## 2021-01-26 RX ORDER — DEXAMETHASONE SODIUM PHOSPHATE 4 MG/ML
INJECTION, SOLUTION INTRA-ARTICULAR; INTRALESIONAL; INTRAMUSCULAR; INTRAVENOUS; SOFT TISSUE
Status: DISCONTINUED | OUTPATIENT
Start: 2021-01-26 | End: 2021-01-26

## 2021-01-26 RX ORDER — MUPIROCIN 20 MG/G
OINTMENT TOPICAL
Status: DISCONTINUED | OUTPATIENT
Start: 2021-01-26 | End: 2021-01-26 | Stop reason: HOSPADM

## 2021-01-26 RX ORDER — MIDAZOLAM HYDROCHLORIDE 1 MG/ML
INJECTION INTRAMUSCULAR; INTRAVENOUS
Status: DISCONTINUED | OUTPATIENT
Start: 2021-01-26 | End: 2021-01-26

## 2021-01-26 RX ORDER — ONDANSETRON 4 MG/1
8 TABLET, ORALLY DISINTEGRATING ORAL EVERY 8 HOURS PRN
Status: DISCONTINUED | OUTPATIENT
Start: 2021-01-26 | End: 2021-01-26 | Stop reason: HOSPADM

## 2021-01-26 RX ORDER — ONDANSETRON HYDROCHLORIDE 2 MG/ML
INJECTION, SOLUTION INTRAMUSCULAR; INTRAVENOUS
Status: DISCONTINUED | OUTPATIENT
Start: 2021-01-26 | End: 2021-01-26

## 2021-01-26 RX ORDER — PROPOFOL 10 MG/ML
VIAL (ML) INTRAVENOUS
Status: DISCONTINUED | OUTPATIENT
Start: 2021-01-26 | End: 2021-01-26

## 2021-01-26 RX ORDER — HYDROCODONE BITARTRATE AND ACETAMINOPHEN 10; 325 MG/1; MG/1
1 TABLET ORAL EVERY 4 HOURS PRN
Status: DISCONTINUED | OUTPATIENT
Start: 2021-01-26 | End: 2021-01-26 | Stop reason: HOSPADM

## 2021-01-26 RX ORDER — NEOSTIGMINE METHYLSULFATE 1 MG/ML
INJECTION, SOLUTION INTRAVENOUS
Status: DISCONTINUED | OUTPATIENT
Start: 2021-01-26 | End: 2021-01-26

## 2021-01-26 RX ORDER — HYDROCODONE BITARTRATE AND ACETAMINOPHEN 5; 325 MG/1; MG/1
1 TABLET ORAL EVERY 4 HOURS PRN
Status: DISCONTINUED | OUTPATIENT
Start: 2021-01-26 | End: 2021-01-26 | Stop reason: HOSPADM

## 2021-01-26 RX ORDER — SODIUM CHLORIDE 9 MG/ML
INJECTION, SOLUTION INTRAVENOUS CONTINUOUS
Status: DISCONTINUED | OUTPATIENT
Start: 2021-01-26 | End: 2021-01-26 | Stop reason: HOSPADM

## 2021-01-26 RX ORDER — ROCURONIUM BROMIDE 10 MG/ML
INJECTION, SOLUTION INTRAVENOUS
Status: DISCONTINUED | OUTPATIENT
Start: 2021-01-26 | End: 2021-01-26

## 2021-01-26 RX ORDER — LIDOCAINE HCL/PF 100 MG/5ML
SYRINGE (ML) INTRAVENOUS
Status: DISCONTINUED | OUTPATIENT
Start: 2021-01-26 | End: 2021-01-26

## 2021-01-26 RX ORDER — KETOROLAC TROMETHAMINE 30 MG/ML
INJECTION, SOLUTION INTRAMUSCULAR; INTRAVENOUS
Status: DISCONTINUED | OUTPATIENT
Start: 2021-01-26 | End: 2021-01-26

## 2021-01-26 RX ORDER — PHENYLEPHRINE HYDROCHLORIDE 10 MG/ML
INJECTION INTRAVENOUS
Status: DISCONTINUED | OUTPATIENT
Start: 2021-01-26 | End: 2021-01-26

## 2021-01-26 RX ADMIN — NEOSTIGMINE METHYLSULFATE 5 MG: 1 INJECTION INTRAVENOUS at 11:01

## 2021-01-26 RX ADMIN — HYDROMORPHONE HYDROCHLORIDE 0.2 MG: 2 INJECTION INTRAMUSCULAR; INTRAVENOUS; SUBCUTANEOUS at 12:01

## 2021-01-26 RX ADMIN — HYDROMORPHONE HYDROCHLORIDE 0.2 MG: 2 INJECTION INTRAMUSCULAR; INTRAVENOUS; SUBCUTANEOUS at 01:01

## 2021-01-26 RX ADMIN — LIDOCAINE HYDROCHLORIDE 100 MG: 20 INJECTION, SOLUTION INTRAVENOUS at 07:01

## 2021-01-26 RX ADMIN — KETOROLAC TROMETHAMINE 30 MG: 30 INJECTION, SOLUTION INTRAMUSCULAR; INTRAVENOUS at 11:01

## 2021-01-26 RX ADMIN — FENTANYL CITRATE 50 MCG: 50 INJECTION, SOLUTION INTRAMUSCULAR; INTRAVENOUS at 08:01

## 2021-01-26 RX ADMIN — GLYCOPYRROLATE 0.6 MG: 0.2 INJECTION, SOLUTION INTRAMUSCULAR; INTRAVITREAL at 11:01

## 2021-01-26 RX ADMIN — HYDROMORPHONE HYDROCHLORIDE 0.2 MG: 2 INJECTION INTRAMUSCULAR; INTRAVENOUS; SUBCUTANEOUS at 11:01

## 2021-01-26 RX ADMIN — GLYCOPYRROLATE 0.2 MG: 0.2 INJECTION, SOLUTION INTRAMUSCULAR; INTRAVITREAL at 07:01

## 2021-01-26 RX ADMIN — ROCURONIUM BROMIDE 10 MG: 10 INJECTION, SOLUTION INTRAVENOUS at 08:01

## 2021-01-26 RX ADMIN — CEFAZOLIN SODIUM 2 G: 2 SOLUTION INTRAVENOUS at 07:01

## 2021-01-26 RX ADMIN — ROCURONIUM BROMIDE 30 MG: 10 INJECTION, SOLUTION INTRAVENOUS at 07:01

## 2021-01-26 RX ADMIN — ESMOLOL HYDROCHLORIDE 10 MCG: 10 INJECTION INTRAVENOUS at 08:01

## 2021-01-26 RX ADMIN — HYDROCODONE BITARTRATE AND ACETAMINOPHEN 1 TABLET: 10; 325 TABLET ORAL at 04:01

## 2021-01-26 RX ADMIN — FENTANYL CITRATE 100 MCG: 50 INJECTION, SOLUTION INTRAMUSCULAR; INTRAVENOUS at 07:01

## 2021-01-26 RX ADMIN — PHENYLEPHRINE HYDROCHLORIDE 100 MCG: 10 INJECTION INTRAVENOUS at 10:01

## 2021-01-26 RX ADMIN — MUPIROCIN: 20 OINTMENT TOPICAL at 06:01

## 2021-01-26 RX ADMIN — MIDAZOLAM HYDROCHLORIDE 2 MG: 1 INJECTION, SOLUTION INTRAMUSCULAR; INTRAVENOUS at 07:01

## 2021-01-26 RX ADMIN — ONDANSETRON 4 MG: 2 INJECTION, SOLUTION INTRAMUSCULAR; INTRAVENOUS at 07:01

## 2021-01-26 RX ADMIN — DEXAMETHASONE SODIUM PHOSPHATE 4 MG: 4 INJECTION, SOLUTION INTRAMUSCULAR; INTRAVENOUS at 08:01

## 2021-01-26 RX ADMIN — FENTANYL CITRATE 50 MCG: 50 INJECTION, SOLUTION INTRAMUSCULAR; INTRAVENOUS at 11:01

## 2021-01-26 RX ADMIN — PROPOFOL 180 MG: 10 INJECTION, EMULSION INTRAVENOUS at 07:01

## 2021-01-27 ENCOUNTER — PATIENT MESSAGE (OUTPATIENT)
Dept: OBSTETRICS AND GYNECOLOGY | Facility: CLINIC | Age: 51
End: 2021-01-27

## 2021-01-28 LAB
FINAL PATHOLOGIC DIAGNOSIS: NORMAL
GROSS: NORMAL
Lab: NORMAL

## 2021-02-02 ENCOUNTER — TELEPHONE (OUTPATIENT)
Dept: OBSTETRICS AND GYNECOLOGY | Facility: CLINIC | Age: 51
End: 2021-02-02

## 2021-02-06 ENCOUNTER — PATIENT MESSAGE (OUTPATIENT)
Dept: OBSTETRICS AND GYNECOLOGY | Facility: CLINIC | Age: 51
End: 2021-02-06

## 2021-02-11 ENCOUNTER — OFFICE VISIT (OUTPATIENT)
Dept: OBSTETRICS AND GYNECOLOGY | Facility: CLINIC | Age: 51
End: 2021-02-11
Payer: COMMERCIAL

## 2021-02-11 VITALS
SYSTOLIC BLOOD PRESSURE: 112 MMHG | DIASTOLIC BLOOD PRESSURE: 86 MMHG | WEIGHT: 174.81 LBS | BODY MASS INDEX: 30.97 KG/M2

## 2021-02-11 DIAGNOSIS — T14.8XXA WOUND INFECTION: Primary | ICD-10-CM

## 2021-02-11 DIAGNOSIS — Z98.890 POST-OPERATIVE STATE: ICD-10-CM

## 2021-02-11 DIAGNOSIS — L08.9 WOUND INFECTION: Primary | ICD-10-CM

## 2021-02-11 PROCEDURE — 87070 CULTURE OTHR SPECIMN AEROBIC: CPT

## 2021-02-11 PROCEDURE — 1125F AMNT PAIN NOTED PAIN PRSNT: CPT | Mod: S$GLB,,, | Performed by: OBSTETRICS & GYNECOLOGY

## 2021-02-11 PROCEDURE — 3008F PR BODY MASS INDEX (BMI) DOCUMENTED: ICD-10-PCS | Mod: CPTII,S$GLB,, | Performed by: OBSTETRICS & GYNECOLOGY

## 2021-02-11 PROCEDURE — 99999 PR PBB SHADOW E&M-EST. PATIENT-LVL II: CPT | Mod: PBBFAC,,, | Performed by: OBSTETRICS & GYNECOLOGY

## 2021-02-11 PROCEDURE — 99024 POSTOP FOLLOW-UP VISIT: CPT | Mod: S$GLB,,, | Performed by: OBSTETRICS & GYNECOLOGY

## 2021-02-11 PROCEDURE — 1125F PR PAIN SEVERITY QUANTIFIED, PAIN PRESENT: ICD-10-PCS | Mod: S$GLB,,, | Performed by: OBSTETRICS & GYNECOLOGY

## 2021-02-11 PROCEDURE — 99024 PR POST-OP FOLLOW-UP VISIT: ICD-10-PCS | Mod: S$GLB,,, | Performed by: OBSTETRICS & GYNECOLOGY

## 2021-02-11 PROCEDURE — 3008F BODY MASS INDEX DOCD: CPT | Mod: CPTII,S$GLB,, | Performed by: OBSTETRICS & GYNECOLOGY

## 2021-02-11 PROCEDURE — 99999 PR PBB SHADOW E&M-EST. PATIENT-LVL II: ICD-10-PCS | Mod: PBBFAC,,, | Performed by: OBSTETRICS & GYNECOLOGY

## 2021-02-11 RX ORDER — CEPHALEXIN 500 MG/1
500 CAPSULE ORAL EVERY 12 HOURS
Qty: 14 CAPSULE | Refills: 0 | Status: SHIPPED | OUTPATIENT
Start: 2021-02-11 | End: 2021-02-18

## 2021-02-15 LAB — BACTERIA SPEC AEROBE CULT: NORMAL

## 2021-04-06 ENCOUNTER — PATIENT MESSAGE (OUTPATIENT)
Dept: ADMINISTRATIVE | Facility: HOSPITAL | Age: 51
End: 2021-04-06

## 2021-04-07 ENCOUNTER — PATIENT OUTREACH (OUTPATIENT)
Dept: ADMINISTRATIVE | Facility: OTHER | Age: 51
End: 2021-04-07

## 2021-04-14 ENCOUNTER — OFFICE VISIT (OUTPATIENT)
Dept: ENDOCRINOLOGY | Facility: CLINIC | Age: 51
End: 2021-04-14
Payer: COMMERCIAL

## 2021-04-14 VITALS
HEART RATE: 62 BPM | TEMPERATURE: 98 F | WEIGHT: 174.69 LBS | BODY MASS INDEX: 30.95 KG/M2 | DIASTOLIC BLOOD PRESSURE: 84 MMHG | SYSTOLIC BLOOD PRESSURE: 123 MMHG

## 2021-04-14 DIAGNOSIS — E66.09 CLASS 1 OBESITY DUE TO EXCESS CALORIES WITHOUT SERIOUS COMORBIDITY WITH BODY MASS INDEX (BMI) OF 30.0 TO 30.9 IN ADULT: ICD-10-CM

## 2021-04-14 DIAGNOSIS — E03.9 ACQUIRED HYPOTHYROIDISM: Primary | ICD-10-CM

## 2021-04-14 PROCEDURE — 99999 PR PBB SHADOW E&M-EST. PATIENT-LVL III: CPT | Mod: PBBFAC,,, | Performed by: HOSPITALIST

## 2021-04-14 PROCEDURE — 3008F BODY MASS INDEX DOCD: CPT | Mod: CPTII,S$GLB,, | Performed by: HOSPITALIST

## 2021-04-14 PROCEDURE — 99213 OFFICE O/P EST LOW 20 MIN: CPT | Mod: S$GLB,,, | Performed by: HOSPITALIST

## 2021-04-14 PROCEDURE — 1126F PR PAIN SEVERITY QUANTIFIED, NO PAIN PRESENT: ICD-10-PCS | Mod: S$GLB,,, | Performed by: HOSPITALIST

## 2021-04-14 PROCEDURE — 99213 PR OFFICE/OUTPT VISIT, EST, LEVL III, 20-29 MIN: ICD-10-PCS | Mod: S$GLB,,, | Performed by: HOSPITALIST

## 2021-04-14 PROCEDURE — 1126F AMNT PAIN NOTED NONE PRSNT: CPT | Mod: S$GLB,,, | Performed by: HOSPITALIST

## 2021-04-14 PROCEDURE — 99999 PR PBB SHADOW E&M-EST. PATIENT-LVL III: ICD-10-PCS | Mod: PBBFAC,,, | Performed by: HOSPITALIST

## 2021-04-14 PROCEDURE — 3008F PR BODY MASS INDEX (BMI) DOCUMENTED: ICD-10-PCS | Mod: CPTII,S$GLB,, | Performed by: HOSPITALIST

## 2021-04-15 ENCOUNTER — LAB VISIT (OUTPATIENT)
Dept: LAB | Facility: HOSPITAL | Age: 51
End: 2021-04-15
Attending: HOSPITALIST
Payer: COMMERCIAL

## 2021-04-15 DIAGNOSIS — R63.5 WEIGHT GAIN: ICD-10-CM

## 2021-04-15 DIAGNOSIS — E03.9 HYPOTHYROIDISM, UNSPECIFIED TYPE: ICD-10-CM

## 2021-04-15 DIAGNOSIS — E66.9 OBESITY, UNSPECIFIED CLASSIFICATION, UNSPECIFIED OBESITY TYPE, UNSPECIFIED WHETHER SERIOUS COMORBIDITY PRESENT: ICD-10-CM

## 2021-04-15 DIAGNOSIS — E03.9 ACQUIRED HYPOTHYROIDISM: ICD-10-CM

## 2021-04-15 LAB
25(OH)D3+25(OH)D2 SERPL-MCNC: 10 NG/ML (ref 30–96)
ALBUMIN SERPL BCP-MCNC: 3.8 G/DL (ref 3.5–5.2)
ALP SERPL-CCNC: 86 U/L (ref 55–135)
ALT SERPL W/O P-5'-P-CCNC: 22 U/L (ref 10–44)
ANION GAP SERPL CALC-SCNC: 7 MMOL/L (ref 8–16)
AST SERPL-CCNC: 24 U/L (ref 10–40)
BILIRUB SERPL-MCNC: 0.2 MG/DL (ref 0.1–1)
BUN SERPL-MCNC: 13 MG/DL (ref 6–20)
CALCIUM SERPL-MCNC: 9.1 MG/DL (ref 8.7–10.5)
CHLORIDE SERPL-SCNC: 109 MMOL/L (ref 95–110)
CO2 SERPL-SCNC: 24 MMOL/L (ref 23–29)
CREAT SERPL-MCNC: 1.1 MG/DL (ref 0.5–1.4)
EST. GFR  (AFRICAN AMERICAN): >60 ML/MIN/1.73 M^2
EST. GFR  (NON AFRICAN AMERICAN): 59 ML/MIN/1.73 M^2
ESTIMATED AVG GLUCOSE: 97 MG/DL (ref 68–131)
FOLATE SERPL-MCNC: 5.9 NG/ML (ref 4–24)
GLUCOSE SERPL-MCNC: 94 MG/DL (ref 70–110)
HBA1C MFR BLD: 5 % (ref 4–5.6)
POTASSIUM SERPL-SCNC: 4.5 MMOL/L (ref 3.5–5.1)
PROT SERPL-MCNC: 8 G/DL (ref 6–8.4)
SODIUM SERPL-SCNC: 140 MMOL/L (ref 136–145)
T4 FREE SERPL-MCNC: 1.08 NG/DL (ref 0.71–1.51)
TSH SERPL DL<=0.005 MIU/L-ACNC: 2.7 UIU/ML (ref 0.4–4)
VIT B12 SERPL-MCNC: 706 PG/ML (ref 210–950)

## 2021-04-15 PROCEDURE — 82306 VITAMIN D 25 HYDROXY: CPT | Performed by: HOSPITALIST

## 2021-04-15 PROCEDURE — 83516 IMMUNOASSAY NONANTIBODY: CPT | Mod: 59 | Performed by: HOSPITALIST

## 2021-04-15 PROCEDURE — 80053 COMPREHEN METABOLIC PANEL: CPT | Performed by: HOSPITALIST

## 2021-04-15 PROCEDURE — 84439 ASSAY OF FREE THYROXINE: CPT | Performed by: HOSPITALIST

## 2021-04-15 PROCEDURE — 84443 ASSAY THYROID STIM HORMONE: CPT | Performed by: HOSPITALIST

## 2021-04-15 PROCEDURE — 83036 HEMOGLOBIN GLYCOSYLATED A1C: CPT | Performed by: HOSPITALIST

## 2021-04-15 PROCEDURE — 82746 ASSAY OF FOLIC ACID SERUM: CPT | Performed by: HOSPITALIST

## 2021-04-15 PROCEDURE — 36415 COLL VENOUS BLD VENIPUNCTURE: CPT | Mod: PN | Performed by: HOSPITALIST

## 2021-04-15 PROCEDURE — 82607 VITAMIN B-12: CPT | Performed by: HOSPITALIST

## 2021-04-16 ENCOUNTER — PATIENT MESSAGE (OUTPATIENT)
Dept: ENDOCRINOLOGY | Facility: CLINIC | Age: 51
End: 2021-04-16

## 2021-04-16 DIAGNOSIS — E55.9 VITAMIN D DEFICIENCY: Primary | ICD-10-CM

## 2021-04-16 DIAGNOSIS — E03.9 ACQUIRED HYPOTHYROIDISM: ICD-10-CM

## 2021-04-16 RX ORDER — ERGOCALCIFEROL 1.25 MG/1
50000 CAPSULE ORAL
Qty: 12 CAPSULE | Refills: 3 | Status: SHIPPED | OUTPATIENT
Start: 2021-04-16 | End: 2021-11-14 | Stop reason: SDUPTHER

## 2021-04-19 ENCOUNTER — OFFICE VISIT (OUTPATIENT)
Dept: OBSTETRICS AND GYNECOLOGY | Facility: CLINIC | Age: 51
End: 2021-04-19
Payer: COMMERCIAL

## 2021-04-19 VITALS — SYSTOLIC BLOOD PRESSURE: 136 MMHG | BODY MASS INDEX: 30.7 KG/M2 | WEIGHT: 173.31 LBS | DIASTOLIC BLOOD PRESSURE: 84 MMHG

## 2021-04-19 DIAGNOSIS — B37.9 YEAST INFECTION: ICD-10-CM

## 2021-04-19 DIAGNOSIS — Z98.890 POST-OPERATIVE STATE: Primary | ICD-10-CM

## 2021-04-19 LAB
GLIADIN PEPTIDE IGA SER-ACNC: 5 UNITS
GLIADIN PEPTIDE IGG SER-ACNC: 4 UNITS
IGA SERPL-MCNC: 265 MG/DL (ref 70–400)
TTG IGA SER-ACNC: 6 UNITS
TTG IGG SER-ACNC: 3 UNITS

## 2021-04-19 PROCEDURE — 1126F AMNT PAIN NOTED NONE PRSNT: CPT | Mod: S$GLB,,, | Performed by: OBSTETRICS & GYNECOLOGY

## 2021-04-19 PROCEDURE — 99999 PR PBB SHADOW E&M-EST. PATIENT-LVL II: ICD-10-PCS | Mod: PBBFAC,,, | Performed by: OBSTETRICS & GYNECOLOGY

## 2021-04-19 PROCEDURE — 99499 UNLISTED E&M SERVICE: CPT | Mod: S$GLB,,, | Performed by: OBSTETRICS & GYNECOLOGY

## 2021-04-19 PROCEDURE — 99999 PR PBB SHADOW E&M-EST. PATIENT-LVL II: CPT | Mod: PBBFAC,,, | Performed by: OBSTETRICS & GYNECOLOGY

## 2021-04-19 PROCEDURE — 3008F BODY MASS INDEX DOCD: CPT | Mod: CPTII,S$GLB,, | Performed by: OBSTETRICS & GYNECOLOGY

## 2021-04-19 PROCEDURE — 1126F PR PAIN SEVERITY QUANTIFIED, NO PAIN PRESENT: ICD-10-PCS | Mod: S$GLB,,, | Performed by: OBSTETRICS & GYNECOLOGY

## 2021-04-19 PROCEDURE — 99499 NO LOS: ICD-10-PCS | Mod: S$GLB,,, | Performed by: OBSTETRICS & GYNECOLOGY

## 2021-04-19 PROCEDURE — 3008F PR BODY MASS INDEX (BMI) DOCUMENTED: ICD-10-PCS | Mod: CPTII,S$GLB,, | Performed by: OBSTETRICS & GYNECOLOGY

## 2021-04-19 RX ORDER — FLUCONAZOLE 150 MG/1
150 TABLET ORAL ONCE
Qty: 1 TABLET | Refills: 0 | Status: SHIPPED | OUTPATIENT
Start: 2021-04-19 | End: 2021-04-19

## 2021-04-20 ENCOUNTER — PATIENT MESSAGE (OUTPATIENT)
Dept: ENDOCRINOLOGY | Facility: CLINIC | Age: 51
End: 2021-04-20

## 2021-04-20 ENCOUNTER — PATIENT MESSAGE (OUTPATIENT)
Dept: OBSTETRICS AND GYNECOLOGY | Facility: CLINIC | Age: 51
End: 2021-04-20

## 2021-04-25 DIAGNOSIS — G43.829 MENSTRUAL MIGRAINE WITHOUT STATUS MIGRAINOSUS, NOT INTRACTABLE: ICD-10-CM

## 2021-04-25 RX ORDER — TOPIRAMATE 100 MG/1
TABLET, FILM COATED ORAL
Qty: 60 TABLET | Refills: 5 | Status: SHIPPED | OUTPATIENT
Start: 2021-04-25 | End: 2021-05-26 | Stop reason: SDUPTHER

## 2021-05-19 ENCOUNTER — PATIENT OUTREACH (OUTPATIENT)
Dept: ADMINISTRATIVE | Facility: HOSPITAL | Age: 51
End: 2021-05-19

## 2021-05-19 DIAGNOSIS — Z12.11 COLON CANCER SCREENING: Primary | ICD-10-CM

## 2021-05-26 DIAGNOSIS — L23.9 ALLERGIC CONTACT DERMATITIS, UNSPECIFIED TRIGGER: ICD-10-CM

## 2021-05-26 RX ORDER — FLUOCINONIDE 0.5 MG/G
OINTMENT TOPICAL
Qty: 15 G | Refills: 0 | Status: SHIPPED | OUTPATIENT
Start: 2021-05-26 | End: 2021-11-03 | Stop reason: SDUPTHER

## 2021-06-02 ENCOUNTER — PATIENT OUTREACH (OUTPATIENT)
Dept: ADMINISTRATIVE | Facility: OTHER | Age: 51
End: 2021-06-02

## 2021-06-02 ENCOUNTER — OFFICE VISIT (OUTPATIENT)
Dept: FAMILY MEDICINE | Facility: CLINIC | Age: 51
End: 2021-06-02
Payer: COMMERCIAL

## 2021-06-02 VITALS
OXYGEN SATURATION: 99 % | BODY MASS INDEX: 30.59 KG/M2 | SYSTOLIC BLOOD PRESSURE: 116 MMHG | HEART RATE: 83 BPM | TEMPERATURE: 98 F | HEIGHT: 63 IN | WEIGHT: 172.63 LBS | DIASTOLIC BLOOD PRESSURE: 82 MMHG

## 2021-06-02 DIAGNOSIS — Z12.31 ENCOUNTER FOR SCREENING MAMMOGRAM FOR MALIGNANT NEOPLASM OF BREAST: ICD-10-CM

## 2021-06-02 DIAGNOSIS — H61.23 BILATERAL IMPACTED CERUMEN: ICD-10-CM

## 2021-06-02 DIAGNOSIS — Z00.00 PREVENTATIVE HEALTH CARE: Primary | ICD-10-CM

## 2021-06-02 DIAGNOSIS — Z12.11 SCREENING FOR COLON CANCER: ICD-10-CM

## 2021-06-02 DIAGNOSIS — G43.809 OTHER MIGRAINE WITHOUT STATUS MIGRAINOSUS, NOT INTRACTABLE: ICD-10-CM

## 2021-06-02 DIAGNOSIS — M77.8 DELTOID TENDONITIS OF RIGHT SHOULDER: ICD-10-CM

## 2021-06-02 DIAGNOSIS — E03.9 ACQUIRED HYPOTHYROIDISM: ICD-10-CM

## 2021-06-02 PROCEDURE — 3008F PR BODY MASS INDEX (BMI) DOCUMENTED: ICD-10-PCS | Mod: CPTII,S$GLB,, | Performed by: INTERNAL MEDICINE

## 2021-06-02 PROCEDURE — 99999 PR PBB SHADOW E&M-EST. PATIENT-LVL V: CPT | Mod: PBBFAC,,, | Performed by: INTERNAL MEDICINE

## 2021-06-02 PROCEDURE — 99396 PR PREVENTIVE VISIT,EST,40-64: ICD-10-PCS | Mod: S$GLB,,, | Performed by: INTERNAL MEDICINE

## 2021-06-02 PROCEDURE — 1126F PR PAIN SEVERITY QUANTIFIED, NO PAIN PRESENT: ICD-10-PCS | Mod: S$GLB,,, | Performed by: INTERNAL MEDICINE

## 2021-06-02 PROCEDURE — 99999 PR PBB SHADOW E&M-EST. PATIENT-LVL V: ICD-10-PCS | Mod: PBBFAC,,, | Performed by: INTERNAL MEDICINE

## 2021-06-02 PROCEDURE — 3008F BODY MASS INDEX DOCD: CPT | Mod: CPTII,S$GLB,, | Performed by: INTERNAL MEDICINE

## 2021-06-02 PROCEDURE — 99396 PREV VISIT EST AGE 40-64: CPT | Mod: S$GLB,,, | Performed by: INTERNAL MEDICINE

## 2021-06-02 PROCEDURE — 1126F AMNT PAIN NOTED NONE PRSNT: CPT | Mod: S$GLB,,, | Performed by: INTERNAL MEDICINE

## 2021-06-04 ENCOUNTER — OFFICE VISIT (OUTPATIENT)
Dept: OTOLARYNGOLOGY | Facility: CLINIC | Age: 51
End: 2021-06-04
Payer: COMMERCIAL

## 2021-06-04 ENCOUNTER — PATIENT MESSAGE (OUTPATIENT)
Dept: OTOLARYNGOLOGY | Facility: CLINIC | Age: 51
End: 2021-06-04

## 2021-06-04 VITALS
WEIGHT: 172.63 LBS | TEMPERATURE: 98 F | HEIGHT: 63 IN | SYSTOLIC BLOOD PRESSURE: 118 MMHG | DIASTOLIC BLOOD PRESSURE: 82 MMHG | BODY MASS INDEX: 30.59 KG/M2

## 2021-06-04 DIAGNOSIS — H61.23 BILATERAL IMPACTED CERUMEN: Primary | ICD-10-CM

## 2021-06-04 DIAGNOSIS — H92.03 OTALGIA OF BOTH EARS: ICD-10-CM

## 2021-06-04 DIAGNOSIS — H93.19 TINNITUS, UNSPECIFIED LATERALITY: ICD-10-CM

## 2021-06-04 PROCEDURE — 99213 OFFICE O/P EST LOW 20 MIN: CPT | Mod: 25,S$GLB,, | Performed by: NURSE PRACTITIONER

## 2021-06-04 PROCEDURE — 1126F PR PAIN SEVERITY QUANTIFIED, NO PAIN PRESENT: ICD-10-PCS | Mod: S$GLB,,, | Performed by: NURSE PRACTITIONER

## 2021-06-04 PROCEDURE — 3008F BODY MASS INDEX DOCD: CPT | Mod: CPTII,S$GLB,, | Performed by: NURSE PRACTITIONER

## 2021-06-04 PROCEDURE — 69210 REMOVE IMPACTED EAR WAX UNI: CPT | Mod: S$GLB,,, | Performed by: NURSE PRACTITIONER

## 2021-06-04 PROCEDURE — 99213 PR OFFICE/OUTPT VISIT, EST, LEVL III, 20-29 MIN: ICD-10-PCS | Mod: 25,S$GLB,, | Performed by: NURSE PRACTITIONER

## 2021-06-04 PROCEDURE — 1126F AMNT PAIN NOTED NONE PRSNT: CPT | Mod: S$GLB,,, | Performed by: NURSE PRACTITIONER

## 2021-06-04 PROCEDURE — 3008F PR BODY MASS INDEX (BMI) DOCUMENTED: ICD-10-PCS | Mod: CPTII,S$GLB,, | Performed by: NURSE PRACTITIONER

## 2021-06-04 PROCEDURE — 69210 EAR CERUMEN REMOVAL: ICD-10-PCS | Mod: S$GLB,,, | Performed by: NURSE PRACTITIONER

## 2021-06-05 ENCOUNTER — PATIENT MESSAGE (OUTPATIENT)
Dept: FAMILY MEDICINE | Facility: CLINIC | Age: 51
End: 2021-06-05

## 2021-06-08 ENCOUNTER — HOSPITAL ENCOUNTER (OUTPATIENT)
Dept: RADIOLOGY | Facility: HOSPITAL | Age: 51
Discharge: HOME | End: 2021-06-08
Attending: INTERNAL MEDICINE
Payer: COMMERCIAL

## 2021-06-08 VITALS — WEIGHT: 172 LBS | HEIGHT: 63 IN | BODY MASS INDEX: 30.48 KG/M2

## 2021-06-08 DIAGNOSIS — Z12.11 SPECIAL SCREENING FOR MALIGNANT NEOPLASM OF COLON: Primary | ICD-10-CM

## 2021-06-08 DIAGNOSIS — Z12.31 ENCOUNTER FOR SCREENING MAMMOGRAM FOR MALIGNANT NEOPLASM OF BREAST: ICD-10-CM

## 2021-06-08 PROCEDURE — 77067 MAMMO DIGITAL SCREENING BILAT WITH TOMO: ICD-10-PCS | Mod: 26,,, | Performed by: RADIOLOGY

## 2021-06-08 PROCEDURE — 77063 BREAST TOMOSYNTHESIS BI: CPT | Mod: 26,,, | Performed by: RADIOLOGY

## 2021-06-08 PROCEDURE — 77067 SCR MAMMO BI INCL CAD: CPT | Mod: 26,,, | Performed by: RADIOLOGY

## 2021-06-08 PROCEDURE — 77067 SCR MAMMO BI INCL CAD: CPT | Mod: TC

## 2021-06-08 PROCEDURE — 77063 MAMMO DIGITAL SCREENING BILAT WITH TOMO: ICD-10-PCS | Mod: 26,,, | Performed by: RADIOLOGY

## 2021-06-14 ENCOUNTER — PATIENT MESSAGE (OUTPATIENT)
Dept: ENDOCRINOLOGY | Facility: CLINIC | Age: 51
End: 2021-06-14

## 2021-06-14 ENCOUNTER — LAB VISIT (OUTPATIENT)
Dept: LAB | Facility: HOSPITAL | Age: 51
End: 2021-06-14
Attending: HOSPITALIST
Payer: COMMERCIAL

## 2021-06-14 DIAGNOSIS — E03.9 ACQUIRED HYPOTHYROIDISM: ICD-10-CM

## 2021-06-14 DIAGNOSIS — E55.9 VITAMIN D DEFICIENCY: ICD-10-CM

## 2021-06-14 DIAGNOSIS — E03.9 HYPOTHYROIDISM, UNSPECIFIED TYPE: ICD-10-CM

## 2021-06-14 LAB
25(OH)D3+25(OH)D2 SERPL-MCNC: 18 NG/ML (ref 30–96)
T4 FREE SERPL-MCNC: 1.04 NG/DL (ref 0.71–1.51)
TSH SERPL DL<=0.005 MIU/L-ACNC: 0.54 UIU/ML (ref 0.4–4)

## 2021-06-14 PROCEDURE — 36415 COLL VENOUS BLD VENIPUNCTURE: CPT | Mod: PN | Performed by: HOSPITALIST

## 2021-06-14 PROCEDURE — 82306 VITAMIN D 25 HYDROXY: CPT | Performed by: HOSPITALIST

## 2021-06-14 PROCEDURE — 84443 ASSAY THYROID STIM HORMONE: CPT | Performed by: HOSPITALIST

## 2021-06-14 PROCEDURE — 84439 ASSAY OF FREE THYROXINE: CPT | Performed by: HOSPITALIST

## 2021-06-15 ENCOUNTER — PATIENT MESSAGE (OUTPATIENT)
Dept: FAMILY MEDICINE | Facility: CLINIC | Age: 51
End: 2021-06-15

## 2021-06-15 ENCOUNTER — PATIENT MESSAGE (OUTPATIENT)
Dept: ENDOCRINOLOGY | Facility: CLINIC | Age: 51
End: 2021-06-15

## 2021-06-15 DIAGNOSIS — Z12.11 SCREENING FOR COLON CANCER: Primary | ICD-10-CM

## 2021-06-15 DIAGNOSIS — E03.9 HYPOTHYROIDISM, UNSPECIFIED TYPE: ICD-10-CM

## 2021-06-15 RX ORDER — LEVOTHYROXINE SODIUM 200 UG/1
200 TABLET ORAL
Qty: 30 TABLET | Refills: 11 | Status: SHIPPED | OUTPATIENT
Start: 2021-06-15 | End: 2022-06-22

## 2021-06-16 ENCOUNTER — PATIENT MESSAGE (OUTPATIENT)
Dept: FAMILY MEDICINE | Facility: CLINIC | Age: 51
End: 2021-06-16

## 2021-06-17 ENCOUNTER — PATIENT MESSAGE (OUTPATIENT)
Dept: FAMILY MEDICINE | Facility: CLINIC | Age: 51
End: 2021-06-17

## 2021-06-18 ENCOUNTER — CLINICAL SUPPORT (OUTPATIENT)
Dept: AUDIOLOGY | Facility: CLINIC | Age: 51
End: 2021-06-18
Payer: COMMERCIAL

## 2021-06-18 ENCOUNTER — OFFICE VISIT (OUTPATIENT)
Dept: OTOLARYNGOLOGY | Facility: CLINIC | Age: 51
End: 2021-06-18
Payer: COMMERCIAL

## 2021-06-18 VITALS
DIASTOLIC BLOOD PRESSURE: 82 MMHG | TEMPERATURE: 98 F | SYSTOLIC BLOOD PRESSURE: 124 MMHG | WEIGHT: 171.94 LBS | HEIGHT: 63 IN | BODY MASS INDEX: 30.46 KG/M2

## 2021-06-18 DIAGNOSIS — H93.293 ABNORMAL AUDITORY PERCEPTION OF BOTH EARS: Primary | ICD-10-CM

## 2021-06-18 DIAGNOSIS — L29.9 EAR ITCHING: ICD-10-CM

## 2021-06-18 DIAGNOSIS — H93.293 ABNORMAL AUDITORY PERCEPTION, BILATERAL: Primary | ICD-10-CM

## 2021-06-18 DIAGNOSIS — H60.63 CHRONIC NON-INFECTIVE OTITIS EXTERNA OF BOTH EARS, UNSPECIFIED TYPE: ICD-10-CM

## 2021-06-18 PROCEDURE — 92557 COMPREHENSIVE HEARING TEST: CPT | Mod: S$GLB,,, | Performed by: AUDIOLOGIST

## 2021-06-18 PROCEDURE — 99213 PR OFFICE/OUTPT VISIT, EST, LEVL III, 20-29 MIN: ICD-10-PCS | Mod: S$GLB,,, | Performed by: NURSE PRACTITIONER

## 2021-06-18 PROCEDURE — 3008F BODY MASS INDEX DOCD: CPT | Mod: CPTII,S$GLB,, | Performed by: NURSE PRACTITIONER

## 2021-06-18 PROCEDURE — 1126F AMNT PAIN NOTED NONE PRSNT: CPT | Mod: S$GLB,,, | Performed by: NURSE PRACTITIONER

## 2021-06-18 PROCEDURE — 1126F PR PAIN SEVERITY QUANTIFIED, NO PAIN PRESENT: ICD-10-PCS | Mod: S$GLB,,, | Performed by: NURSE PRACTITIONER

## 2021-06-18 PROCEDURE — 99213 OFFICE O/P EST LOW 20 MIN: CPT | Mod: S$GLB,,, | Performed by: NURSE PRACTITIONER

## 2021-06-18 PROCEDURE — 92550 PR TYMPANOMETRY AND REFLEX THRESHOLD MEASUREMENTS: ICD-10-PCS | Mod: S$GLB,,, | Performed by: AUDIOLOGIST

## 2021-06-18 PROCEDURE — 92550 TYMPANOMETRY & REFLEX THRESH: CPT | Mod: S$GLB,,, | Performed by: AUDIOLOGIST

## 2021-06-18 PROCEDURE — 92557 PR COMPREHENSIVE HEARING TEST: ICD-10-PCS | Mod: S$GLB,,, | Performed by: AUDIOLOGIST

## 2021-06-18 PROCEDURE — 3008F PR BODY MASS INDEX (BMI) DOCUMENTED: ICD-10-PCS | Mod: CPTII,S$GLB,, | Performed by: NURSE PRACTITIONER

## 2021-06-18 RX ORDER — FLUOCINOLONE ACETONIDE 0.11 MG/ML
OIL AURICULAR (OTIC)
Qty: 1 BOTTLE | Refills: 0 | Status: SHIPPED | OUTPATIENT
Start: 2021-06-18 | End: 2022-12-23

## 2021-06-27 ENCOUNTER — PATIENT MESSAGE (OUTPATIENT)
Dept: FAMILY MEDICINE | Facility: CLINIC | Age: 51
End: 2021-06-27

## 2021-06-28 ENCOUNTER — PATIENT MESSAGE (OUTPATIENT)
Dept: FAMILY MEDICINE | Facility: CLINIC | Age: 51
End: 2021-06-28

## 2021-06-28 ENCOUNTER — TELEPHONE (OUTPATIENT)
Dept: FAMILY MEDICINE | Facility: CLINIC | Age: 51
End: 2021-06-28

## 2021-06-28 DIAGNOSIS — Z12.11 SCREENING FOR COLON CANCER: Primary | ICD-10-CM

## 2021-06-29 ENCOUNTER — NUTRITION (OUTPATIENT)
Dept: NUTRITION | Facility: CLINIC | Age: 51
End: 2021-06-29
Payer: COMMERCIAL

## 2021-06-29 VITALS — WEIGHT: 173.06 LBS | HEIGHT: 63 IN | BODY MASS INDEX: 30.66 KG/M2

## 2021-06-29 DIAGNOSIS — E66.9 OBESITY (BMI 30-39.9): Primary | ICD-10-CM

## 2021-06-29 DIAGNOSIS — Z71.3 DIETARY COUNSELING: ICD-10-CM

## 2021-06-29 PROCEDURE — 97802 MEDICAL NUTRITION INDIV IN: CPT | Mod: S$GLB,,, | Performed by: DIETITIAN, REGISTERED

## 2021-06-29 PROCEDURE — 97802 PR MED NUTR THER, 1ST, INDIV, EA 15 MIN: ICD-10-PCS | Mod: S$GLB,,, | Performed by: DIETITIAN, REGISTERED

## 2021-06-29 PROCEDURE — 99999 PR PBB SHADOW E&M-EST. PATIENT-LVL III: ICD-10-PCS | Mod: PBBFAC,,,

## 2021-06-29 PROCEDURE — 99999 PR PBB SHADOW E&M-EST. PATIENT-LVL III: CPT | Mod: PBBFAC,,,

## 2021-07-06 ENCOUNTER — PATIENT MESSAGE (OUTPATIENT)
Dept: ADMINISTRATIVE | Facility: HOSPITAL | Age: 51
End: 2021-07-06

## 2021-07-07 ENCOUNTER — PATIENT OUTREACH (OUTPATIENT)
Dept: ADMINISTRATIVE | Facility: HOSPITAL | Age: 51
End: 2021-07-07

## 2021-07-18 ENCOUNTER — PATIENT MESSAGE (OUTPATIENT)
Dept: FAMILY MEDICINE | Facility: CLINIC | Age: 51
End: 2021-07-18

## 2021-07-19 ENCOUNTER — PATIENT MESSAGE (OUTPATIENT)
Dept: NEUROLOGY | Facility: CLINIC | Age: 51
End: 2021-07-19

## 2021-07-21 ENCOUNTER — LAB VISIT (OUTPATIENT)
Dept: LAB | Facility: HOSPITAL | Age: 51
End: 2021-07-21
Attending: INTERNAL MEDICINE
Payer: COMMERCIAL

## 2021-07-21 DIAGNOSIS — Z12.11 COLON CANCER SCREENING: ICD-10-CM

## 2021-07-21 PROCEDURE — 82274 ASSAY TEST FOR BLOOD FECAL: CPT | Performed by: INTERNAL MEDICINE

## 2021-07-26 ENCOUNTER — PATIENT MESSAGE (OUTPATIENT)
Dept: FAMILY MEDICINE | Facility: CLINIC | Age: 51
End: 2021-07-26

## 2021-07-26 DIAGNOSIS — Z12.11 ENCOUNTER FOR FIT (FECAL IMMUNOCHEMICAL TEST) SCREENING: Primary | ICD-10-CM

## 2021-08-02 ENCOUNTER — TELEPHONE (OUTPATIENT)
Dept: FAMILY MEDICINE | Facility: CLINIC | Age: 51
End: 2021-08-02

## 2021-08-03 LAB — HEMOCCULT STL QL IA: NEGATIVE

## 2021-08-09 ENCOUNTER — OFFICE VISIT (OUTPATIENT)
Dept: NEUROLOGY | Facility: CLINIC | Age: 51
End: 2021-08-09
Payer: COMMERCIAL

## 2021-08-09 DIAGNOSIS — G51.31 HEMIFACIAL SPASM OF RIGHT SIDE OF FACE: ICD-10-CM

## 2021-08-09 PROCEDURE — 3044F PR MOST RECENT HEMOGLOBIN A1C LEVEL <7.0%: ICD-10-PCS | Mod: CPTII,,, | Performed by: PSYCHIATRY & NEUROLOGY

## 2021-08-09 PROCEDURE — 99214 OFFICE O/P EST MOD 30 MIN: CPT | Mod: 95,,, | Performed by: PSYCHIATRY & NEUROLOGY

## 2021-08-09 PROCEDURE — 3044F HG A1C LEVEL LT 7.0%: CPT | Mod: CPTII,,, | Performed by: PSYCHIATRY & NEUROLOGY

## 2021-08-09 PROCEDURE — 99214 PR OFFICE/OUTPT VISIT, EST, LEVL IV, 30-39 MIN: ICD-10-PCS | Mod: 95,,, | Performed by: PSYCHIATRY & NEUROLOGY

## 2021-08-12 DIAGNOSIS — G51.39 HEMIFACIAL SPASM, UNSPECIFIED LATERALITY: Primary | ICD-10-CM

## 2021-11-03 DIAGNOSIS — G43.829 MENSTRUAL MIGRAINE WITHOUT STATUS MIGRAINOSUS, NOT INTRACTABLE: ICD-10-CM

## 2021-11-03 RX ORDER — SUMATRIPTAN 50 MG/1
TABLET, FILM COATED ORAL
Qty: 18 TABLET | Refills: 3 | Status: SHIPPED | OUTPATIENT
Start: 2021-11-03 | End: 2021-12-30 | Stop reason: SDUPTHER

## 2021-11-10 ENCOUNTER — TELEPHONE (OUTPATIENT)
Dept: NEUROLOGY | Facility: CLINIC | Age: 51
End: 2021-11-10
Payer: COMMERCIAL

## 2021-11-15 ENCOUNTER — TELEPHONE (OUTPATIENT)
Dept: NEUROLOGY | Facility: CLINIC | Age: 51
End: 2021-11-15
Payer: COMMERCIAL

## 2021-11-16 ENCOUNTER — PATIENT MESSAGE (OUTPATIENT)
Dept: NEUROLOGY | Facility: CLINIC | Age: 51
End: 2021-11-16
Payer: COMMERCIAL

## 2021-11-27 ENCOUNTER — PATIENT MESSAGE (OUTPATIENT)
Dept: NEUROLOGY | Facility: CLINIC | Age: 51
End: 2021-11-27
Payer: COMMERCIAL

## 2021-12-02 ENCOUNTER — PROCEDURE VISIT (OUTPATIENT)
Dept: NEUROLOGY | Facility: CLINIC | Age: 51
End: 2021-12-02
Payer: COMMERCIAL

## 2021-12-02 VITALS — SYSTOLIC BLOOD PRESSURE: 138 MMHG | HEART RATE: 80 BPM | DIASTOLIC BLOOD PRESSURE: 91 MMHG

## 2021-12-02 DIAGNOSIS — G51.31 HEMIFACIAL SPASM OF RIGHT SIDE OF FACE: ICD-10-CM

## 2021-12-02 PROCEDURE — 64612 PR DEST,NERVE,FACIAL: ICD-10-PCS | Mod: RT,S$GLB,, | Performed by: PSYCHIATRY & NEUROLOGY

## 2021-12-02 PROCEDURE — 64612 DESTROY NERVE FACE MUSCLE: CPT | Mod: RT,S$GLB,, | Performed by: PSYCHIATRY & NEUROLOGY

## 2021-12-03 ENCOUNTER — PATIENT MESSAGE (OUTPATIENT)
Dept: NEUROLOGY | Facility: CLINIC | Age: 51
End: 2021-12-03
Payer: COMMERCIAL

## 2021-12-30 ENCOUNTER — LAB VISIT (OUTPATIENT)
Dept: PRIMARY CARE CLINIC | Facility: OTHER | Age: 51
End: 2021-12-30
Attending: INTERNAL MEDICINE
Payer: COMMERCIAL

## 2021-12-30 DIAGNOSIS — Z20.822 ENCOUNTER FOR LABORATORY TESTING FOR COVID-19 VIRUS: ICD-10-CM

## 2021-12-30 PROCEDURE — U0003 INFECTIOUS AGENT DETECTION BY NUCLEIC ACID (DNA OR RNA); SEVERE ACUTE RESPIRATORY SYNDROME CORONAVIRUS 2 (SARS-COV-2) (CORONAVIRUS DISEASE [COVID-19]), AMPLIFIED PROBE TECHNIQUE, MAKING USE OF HIGH THROUGHPUT TECHNOLOGIES AS DESCRIBED BY CMS-2020-01-R: HCPCS | Performed by: INTERNAL MEDICINE

## 2022-01-01 LAB
SARS-COV-2 RNA RESP QL NAA+PROBE: NOT DETECTED
SARS-COV-2- CYCLE NUMBER: NORMAL

## 2022-01-14 ENCOUNTER — TELEPHONE (OUTPATIENT)
Dept: FAMILY MEDICINE | Facility: CLINIC | Age: 52
End: 2022-01-14
Payer: COMMERCIAL

## 2022-01-14 ENCOUNTER — OFFICE VISIT (OUTPATIENT)
Dept: INTERNAL MEDICINE | Facility: CLINIC | Age: 52
End: 2022-01-14
Payer: COMMERCIAL

## 2022-01-14 ENCOUNTER — PATIENT MESSAGE (OUTPATIENT)
Dept: INTERNAL MEDICINE | Facility: CLINIC | Age: 52
End: 2022-01-14

## 2022-01-14 ENCOUNTER — TELEPHONE (OUTPATIENT)
Dept: INTERNAL MEDICINE | Facility: CLINIC | Age: 52
End: 2022-01-14

## 2022-01-14 VITALS
SYSTOLIC BLOOD PRESSURE: 120 MMHG | BODY MASS INDEX: 32.81 KG/M2 | HEIGHT: 63 IN | OXYGEN SATURATION: 99 % | RESPIRATION RATE: 18 BRPM | DIASTOLIC BLOOD PRESSURE: 80 MMHG | WEIGHT: 185.19 LBS | HEART RATE: 79 BPM | TEMPERATURE: 99 F

## 2022-01-14 DIAGNOSIS — M79.10 MUSCLE PAIN: ICD-10-CM

## 2022-01-14 DIAGNOSIS — R05.9 COUGH: ICD-10-CM

## 2022-01-14 DIAGNOSIS — M79.10 MYALGIA: ICD-10-CM

## 2022-01-14 DIAGNOSIS — G43.809 OTHER MIGRAINE WITHOUT STATUS MIGRAINOSUS, NOT INTRACTABLE: ICD-10-CM

## 2022-01-14 DIAGNOSIS — J06.9 UPPER RESPIRATORY TRACT INFECTION, UNSPECIFIED TYPE: Primary | ICD-10-CM

## 2022-01-14 DIAGNOSIS — J02.9 PHARYNGITIS, UNSPECIFIED ETIOLOGY: ICD-10-CM

## 2022-01-14 DIAGNOSIS — R51.9 HEAD ACHE: ICD-10-CM

## 2022-01-14 LAB
GROUP A STREP, MOLECULAR: NEGATIVE
INFLUENZA A, MOLECULAR: NEGATIVE
INFLUENZA B, MOLECULAR: POSITIVE
SPECIMEN SOURCE: ABNORMAL

## 2022-01-14 PROCEDURE — U0005 INFEC AGEN DETEC AMPLI PROBE: HCPCS | Performed by: NURSE PRACTITIONER

## 2022-01-14 PROCEDURE — 99999 PR PBB SHADOW E&M-EST. PATIENT-LVL IV: ICD-10-PCS | Mod: PBBFAC,,, | Performed by: NURSE PRACTITIONER

## 2022-01-14 PROCEDURE — 99214 OFFICE O/P EST MOD 30 MIN: CPT | Mod: S$GLB,,, | Performed by: NURSE PRACTITIONER

## 2022-01-14 PROCEDURE — 3079F PR MOST RECENT DIASTOLIC BLOOD PRESSURE 80-89 MM HG: ICD-10-PCS | Mod: CPTII,S$GLB,, | Performed by: NURSE PRACTITIONER

## 2022-01-14 PROCEDURE — 1159F MED LIST DOCD IN RCRD: CPT | Mod: CPTII,S$GLB,, | Performed by: NURSE PRACTITIONER

## 2022-01-14 PROCEDURE — 99214 PR OFFICE/OUTPT VISIT, EST, LEVL IV, 30-39 MIN: ICD-10-PCS | Mod: S$GLB,,, | Performed by: NURSE PRACTITIONER

## 2022-01-14 PROCEDURE — 3079F DIAST BP 80-89 MM HG: CPT | Mod: CPTII,S$GLB,, | Performed by: NURSE PRACTITIONER

## 2022-01-14 PROCEDURE — 3008F BODY MASS INDEX DOCD: CPT | Mod: CPTII,S$GLB,, | Performed by: NURSE PRACTITIONER

## 2022-01-14 PROCEDURE — 3074F SYST BP LT 130 MM HG: CPT | Mod: CPTII,S$GLB,, | Performed by: NURSE PRACTITIONER

## 2022-01-14 PROCEDURE — U0003 INFECTIOUS AGENT DETECTION BY NUCLEIC ACID (DNA OR RNA); SEVERE ACUTE RESPIRATORY SYNDROME CORONAVIRUS 2 (SARS-COV-2) (CORONAVIRUS DISEASE [COVID-19]), AMPLIFIED PROBE TECHNIQUE, MAKING USE OF HIGH THROUGHPUT TECHNOLOGIES AS DESCRIBED BY CMS-2020-01-R: HCPCS | Performed by: NURSE PRACTITIONER

## 2022-01-14 PROCEDURE — 87651 STREP A DNA AMP PROBE: CPT | Performed by: NURSE PRACTITIONER

## 2022-01-14 PROCEDURE — 1159F PR MEDICATION LIST DOCUMENTED IN MEDICAL RECORD: ICD-10-PCS | Mod: CPTII,S$GLB,, | Performed by: NURSE PRACTITIONER

## 2022-01-14 PROCEDURE — 87502 INFLUENZA DNA AMP PROBE: CPT | Performed by: NURSE PRACTITIONER

## 2022-01-14 PROCEDURE — 3008F PR BODY MASS INDEX (BMI) DOCUMENTED: ICD-10-PCS | Mod: CPTII,S$GLB,, | Performed by: NURSE PRACTITIONER

## 2022-01-14 PROCEDURE — 99999 PR PBB SHADOW E&M-EST. PATIENT-LVL IV: CPT | Mod: PBBFAC,,, | Performed by: NURSE PRACTITIONER

## 2022-01-14 PROCEDURE — 3074F PR MOST RECENT SYSTOLIC BLOOD PRESSURE < 130 MM HG: ICD-10-PCS | Mod: CPTII,S$GLB,, | Performed by: NURSE PRACTITIONER

## 2022-01-14 RX ORDER — METHYLPREDNISOLONE 4 MG/1
TABLET ORAL
Qty: 1 EACH | Refills: 0 | Status: SHIPPED | OUTPATIENT
Start: 2022-01-14 | End: 2022-06-24

## 2022-01-14 RX ORDER — OSELTAMIVIR PHOSPHATE 75 MG/1
75 CAPSULE ORAL 2 TIMES DAILY
Qty: 10 CAPSULE | Refills: 0 | Status: SHIPPED | OUTPATIENT
Start: 2022-01-14 | End: 2022-01-19

## 2022-01-14 RX ORDER — AZITHROMYCIN 250 MG/1
TABLET, FILM COATED ORAL
Qty: 6 TABLET | Refills: 0 | Status: SHIPPED | OUTPATIENT
Start: 2022-01-14 | End: 2022-01-19

## 2022-01-14 RX ORDER — NAPROXEN 500 MG/1
500 TABLET ORAL 2 TIMES DAILY PRN
Qty: 30 TABLET | Refills: 0 | Status: SHIPPED | OUTPATIENT
Start: 2022-01-14 | End: 2022-06-24

## 2022-01-14 NOTE — PROGRESS NOTES
"  INTERNAL MEDICINE CLINIC - SAME DAY APPOINTMENT  Progress Note    PRESENTING HISTORY     PCP: Brock Womack MD    Chief Complaint/Reason for Visit:   No chief complaint on file.      History of Present Illness & ROS : Ms. Magalis Rosales is a 51 y.o. female.    Same day appt. New to this clinic and provider. Very sweet lady.   .   Her with her daughter.   Reports that began to experience a "headache as of last Wednesday, tried the Imitrex and Topamax, which usually helps, but has not gotten any better and will typically resolve migraine pain; headache came back on Monday and worse since; now with sore throat, congestion, and body aches".   Tried Ibuprofen and ASA.   She is uncertain of history of "strep" throat.   No fever or chills. No GI symptoms. No ear pain. She was COVID tested on , "negative" after a reported confirmed exposure.   She is a , have some sick kids out and some new cases.   *No Flu Vaccine.   *COVID Vaccinated, but not boostered.     Review of Systems:  Eyes: denies visual changes at this time denies floaters   ENT: no nasal congestion or sore throat  Respiratory: no cough or shorness of breath  Cardiovascular: no chest pain or palpitations  Gastrointestinal: no nausea or vomiting, no abdominal pain or change in bowel habits  Genitourinary: no hematuria or dysuria; denies frequency  Hematologic/Lymphatic: no easy bruising or lymphadenopathy  Musculoskeletal: no arthralgias or myalgias  Neurological: no seizures or tremors  Endocrine: no heat or cold intolerance      PAST HISTORY:     Past Medical History:   Diagnosis Date    History of psychiatric care     History of psychiatric hospitalization     Hypothyroidism     Migraines     Psychiatric exam requested by [facility]     Psychiatric problem     Therapy        Past Surgical History:   Procedure Laterality Date    APPENDECTOMY       SECTION      HYSTERECTOMY      HYSTEROSCOPY WITH " DILATION AND CURETTAGE OF UTERUS N/A 6/23/2020    Procedure: HYSTEROSCOPY, WITH DILATION AND CURETTAGE OF UTERUS;  Surgeon: Nesha Skaggs Mai, MD;  Location: [facility] OR;  Service: OB/GYN;  Laterality: N/A;  RN PRE OP 6- COVID & T&S 6-. [place]  H/P incomplete----UPT    LAPAROSCOPIC SALPINGECTOMY Bilateral 1/26/2021    Procedure: SALPINGECTOMY, LAPAROSCOPIC;  Surgeon: Nesha Skaggs Mai, MD;  Location: [facility] OR;  Service: OB/GYN;  Laterality: Bilateral;    LAPAROSCOPIC TOTAL HYSTERECTOMY N/A 1/26/2021    Procedure: HYSTERECTOMY, TOTAL, LAPAROSCOPIC;  Surgeon: Nesha Skaggs Mai, MD;  Location: [facility] OR;  Service: OB/GYN;  Laterality: N/A;  RN PREOP 1/25/21,-- COVID NEGATIVE ON 1/25-- T/S, and HCG quant done  with bilateral salpingectomy and cystoscopy  NEED CONSENTS    LASER ABLATION OF THE CERVIX         Family History   Problem Relation Age of Onset    Hypertension Mother     Hypertension Father     Stroke Father     Ovarian cancer Paternal Grandmother        Social History     Socioeconomic History    Marital status: Single    Number of children: 1   Occupational History    Occupation: Unemployed - substitute teaching   Tobacco Use    Smoking status: Never Smoker    Smokeless tobacco: Never Used   Substance and Sexual Activity    Alcohol use: No    Drug use: No    Sexual activity: Not Currently     Partners: Male       MEDICATIONS & ALLERGIES:     Current Outpatient Medications on File Prior to Visit   Medication Sig Dispense Refill    chlorhexidine (PERIDEX) 0.12 % solution       ergocalciferol (ERGOCALCIFEROL) 50,000 unit Cap Take 1 capsule (50,000 Units total) by mouth every 7 days. 12 capsule 3    FEROSUL 325 mg (65 mg iron) Tab tablet TAKE ONE TABLET BY MOUTH DAILY WITH BREAKFAST 30 tablet 11    fluocinolone acetonide oiL 0.01 % Drop Apply 2-3 drops into both ears twice a day for 7 days and then prn for itchiness. 1 Bottle 0    fluocinonide (LIDEX) 0.05 % ointment Apply to affected skin  twice daily for five days 15 g 0    ibuprofen (ADVIL,MOTRIN) 600 MG tablet Take 1 tablet (600 mg total) by mouth every 6 (six) hours as needed for Pain. 30 tablet 1    levothyroxine (SYNTHROID) 200 MCG tablet Take 1 tablet (200 mcg total) by mouth before breakfast. 30 tablet 11    sertraline (ZOLOFT) 100 MG tablet TAKE ONE AND ONE-HALF TABLETS BY MOUTH DAILY 135 tablet 1    sumatriptan (IMITREX) 50 MG tablet TAKE 1 TABLET BY MOUTH AT ONSET OF HEADACHE, MAY REPEAT IN 2 HOURS. NOT MORE THAN 2 IN 24 HOURS 18 tablet 0    topiramate (TOPAMAX) 100 MG tablet Take 1 tablet (100 mg total) by mouth 2 (two) times daily. 60 tablet 5    triamcinolone acetonide 0.1% (KENALOG) 0.1 % ointment Apply topically 2 (two) times daily as needed (rash). 30 g 0     Current Facility-Administered Medications on File Prior to Visit   Medication Dose Route Frequency Provider Last Rate Last Admin    onabotulinumtoxina injection 100 Units  100 Units Intramuscular q12 weeks Ilda Bailey MD   100 Units at 12/02/21 1722        Review of patient's allergies indicates:  No Known Allergies    Medications Reconciliation:   I have reconciled the patient's home medications with the patient/family. I have updated all changes.  Refer to After-Visit Medication List.    OBJECTIVE:     Vital Signs:  There were no vitals filed for this visit.  Wt Readings from Last 3 Encounters:   06/29/21 1105 78.5 kg (173 lb 1 oz)   06/18/21 1106 78 kg (171 lb 15.3 oz)   06/08/21 0932 78 kg (172 lb)     There is no height or weight on file to calculate BMI.   Wt Readings from Last 3 Encounters:   01/14/22 84 kg (185 lb 3 oz)   06/29/21 78.5 kg (173 lb 1 oz)   06/18/21 78 kg (171 lb 15.3 oz)     Temp Readings from Last 3 Encounters:   01/14/22 98.9 °F (37.2 °C)   06/18/21 97.9 °F (36.6 °C) (Temporal)   06/04/21 97.7 °F (36.5 °C) (Temporal)     BP Readings from Last 3 Encounters:   01/14/22 120/80   12/02/21 (!) 138/91   06/18/21 124/82     Pulse Readings from Last  3 Encounters:   01/14/22 79   12/02/21 80   06/02/21 83       Physical Exam:  General: Well developed, well nourished. No distress.  HEENT: Head is normocephalic, atraumatic; ears are normal.   Posterior oral pharynx: mild erythema, no exudate or purulence  Eyes: Clear conjunctiva.  Neck: Supple, symmetrical neck; trachea midline.  Lungs: Clear to auscultation bilaterally and normal respiratory effort.  Cardiovascular: Heart with regular rate and rhythm. No murmurs, gallops or rubs  Extremities: No LE edema. Pulses 2+ and symmetric.   Skin: Skin color, texture, turgor normal. No rashes.  Musculoskeletal: Normal gait.   Lymph Nodes: No cervical or supraclavicular adenopathy.  Neurologic: . No focal numbness or weakness.   Psychiatric: Not depressed.    Laboratory  Lab Results   Component Value Date    WBC 8.71 01/25/2021    HGB 13.3 01/25/2021    HCT 41.5 01/25/2021     01/25/2021    CHOL 173 09/18/2019    TRIG 174 (H) 09/18/2019    HDL 60 09/18/2019    ALT 22 04/15/2021    AST 24 04/15/2021     04/15/2021    K 4.5 04/15/2021     04/15/2021    CREATININE 1.1 04/15/2021    BUN 13 04/15/2021    CO2 24 04/15/2021    TSH 0.538 06/14/2021    INR 1.0 09/18/2019    HGBA1C 5.0 04/15/2021     2019  CT Head:   Unremarkable    ASSESSMENT & PLAN:     Same day apt.   History of Migraines  Seeing Neuro for Botox injections.     Upper respiratory tract infection, unspecified type symptoms:   Head ache  Muscle pain / Myalgia  Cough (mild)  High clinical suspicion for Viral syndrome.   -     Influenza A & B by Molecular  -     Group A Strep, Molecular  -     COVID-19 Routine Screening  -     naproxen (NAPROSYN) 500 MG tablet; Take 1 tablet (500 mg total) by mouth 2 (two) times daily as needed.  Dispense: 30 tablet; Refill: 0  -     methylPREDNISolone (MEDROL DOSEPACK) 4 mg tablet; use as directed  Dispense: 1 each; Refill: 0  -     azithromycin (Z-ROSA) 250 MG tablet; Take 2 tablets by mouth on day 1; Take 1 tablet  by mouth on days 2-5  Dispense: 6 tablet; Refill: 0      Other migraine without status migrainosus, not intractable  -     naproxen (NAPROSYN) 500 MG tablet; Take 1 tablet (500 mg total) by mouth 2 (two) times daily as needed.  Dispense: 30 tablet; Refill: 0  -     methylPREDNISolone (MEDROL DOSEPACK) 4 mg tablet; use as directed  Dispense: 1 each; Refill: 0      Future Appointments   Date Time Provider Department Center   3/4/2022  9:00 AM Ilda Bailey MD [facility] [place] [address]        Medication List          Accurate as of January 14, 2022  4:12 PM. If you have any questions, ask your nurse or doctor.            START taking these medications    azithromycin 250 MG tablet  Commonly known as: Z-ROSA  Take 2 tablets by mouth on day 1; Take 1 tablet by mouth on days 2-5  Started by: NANI Kramer     methylPREDNISolone 4 mg tablet  Commonly known as: MEDROL DOSEPACK  use as directed  Started by: NANI Kramer     naproxen 500 MG tablet  Commonly known as: NAPROSYN  Take 1 tablet (500 mg total) by mouth 2 (two) times daily as needed.  Started by: NANI Kramer        CONTINUE taking these medications    chlorhexidine 0.12 % solution  Commonly known as: PERIDEX     ergocalciferol 50,000 unit Cap  Commonly known as: ERGOCALCIFEROL  Take 1 capsule (50,000 Units total) by mouth every 7 days.     FeroSuL 325 mg (65 mg iron) Tab tablet  Generic drug: ferrous sulfate  TAKE ONE TABLET BY MOUTH DAILY WITH BREAKFAST     fluocinolone acetonide oiL 0.01 % Drop  Apply 2-3 drops into both ears twice a day for 7 days and then prn for itchiness.     fluocinonide 0.05 % ointment  Commonly known as: LIDEX  Apply to affected skin twice daily for five days     levothyroxine 200 MCG tablet  Commonly known as: SYNTHROID  Take 1 tablet (200 mcg total) by mouth before breakfast.     sertraline 100 MG tablet  Commonly known as: ZOLOFT  TAKE ONE AND ONE-HALF TABLETS BY MOUTH DAILY     sumatriptan 50  MG tablet  Commonly known as: IMITREX  TAKE 1 TABLET BY MOUTH AT ONSET OF HEADACHE, MAY REPEAT IN 2 HOURS. NOT MORE THAN 2 IN 24 HOURS     topiramate 100 MG tablet  Commonly known as: TOPAMAX  Take 1 tablet (100 mg total) by mouth 2 (two) times daily.     triamcinolone acetonide 0.1% 0.1 % ointment  Commonly known as: KENALOG  Apply topically 2 (two) times daily as needed (rash).        STOP taking these medications    ibuprofen 600 MG tablet  Commonly known as: ADVIL,MOTALANA  Stopped by: NANI Kramer           Where to Get Your Medications      These medications were sent to [id number] [place]  [address]  [address] [place] [address]    Phone: [phone]   · azithromycin 250 MG tablet  · methylPREDNISolone 4 mg tablet  · naproxen 500 MG tablet       Signing Physician:  NANI Kramer

## 2022-01-14 NOTE — TELEPHONE ENCOUNTER
----- Message from Blanca Paulino sent at 1/14/2022  8:55 AM CST -----  Regarding: mother  .Type:  Needs Medical Advice    Who Called: mother   Symptoms (please be specific): bad headache worse today   How long has patient had these symptoms:    Pharmacy name and phone #:   [id number] [place] [place] [facility] [address] [facility]  [address] [facility]  [place] [address]  Phone: [phone] Fax: [phone]      Would the patient rather a call back or a response via [facility]? call  Best Call Back Number: .[phone]    Additional Information:  patient has appt today at [facility] but mother [place]. No other appts avail for [facility]

## 2022-01-15 LAB
SARS-COV-2 RNA RESP QL NAA+PROBE: DETECTED
SARS-COV-2- CYCLE NUMBER: 26

## 2022-01-16 ENCOUNTER — TELEPHONE (OUTPATIENT)
Dept: INTERNAL MEDICINE | Facility: CLINIC | Age: 52
End: 2022-01-16
Payer: COMMERCIAL

## 2022-01-16 ENCOUNTER — PATIENT MESSAGE (OUTPATIENT)
Dept: INTERNAL MEDICINE | Facility: CLINIC | Age: 52
End: 2022-01-16
Payer: COMMERCIAL

## 2022-01-16 DIAGNOSIS — U07.1 COVID-19: Primary | ICD-10-CM

## 2022-02-01 DIAGNOSIS — F33.41 RECURRENT MAJOR DEPRESSIVE DISORDER, IN PARTIAL REMISSION: ICD-10-CM

## 2022-02-01 DIAGNOSIS — G43.829 MENSTRUAL MIGRAINE WITHOUT STATUS MIGRAINOSUS, NOT INTRACTABLE: ICD-10-CM

## 2022-02-02 RX ORDER — SERTRALINE HYDROCHLORIDE 100 MG/1
150 TABLET, FILM COATED ORAL DAILY
Qty: 135 TABLET | Refills: 1 | Status: SHIPPED | OUTPATIENT
Start: 2022-02-02 | End: 2022-07-01

## 2022-02-02 RX ORDER — SUMATRIPTAN 50 MG/1
TABLET, FILM COATED ORAL
Qty: 30 TABLET | Refills: 0 | Status: SHIPPED | OUTPATIENT
Start: 2022-02-02 | End: 2022-04-22

## 2022-02-02 NOTE — TELEPHONE ENCOUNTER
No new care gaps identified.  Powered by [employer]. Reference number: [id number].   2/01/2022 10:05:21 PM CST

## 2022-02-21 ENCOUNTER — PATIENT MESSAGE (OUTPATIENT)
Dept: NEUROLOGY | Facility: CLINIC | Age: 52
End: 2022-02-21
Payer: COMMERCIAL

## 2022-02-25 ENCOUNTER — PATIENT MESSAGE (OUTPATIENT)
Dept: ENDOCRINOLOGY | Facility: CLINIC | Age: 52
End: 2022-02-25
Payer: COMMERCIAL

## 2022-03-04 ENCOUNTER — PROCEDURE VISIT (OUTPATIENT)
Dept: NEUROLOGY | Facility: CLINIC | Age: 52
End: 2022-03-04
Payer: COMMERCIAL

## 2022-03-04 VITALS
WEIGHT: 185.19 LBS | DIASTOLIC BLOOD PRESSURE: 79 MMHG | HEART RATE: 70 BPM | SYSTOLIC BLOOD PRESSURE: 115 MMHG | BODY MASS INDEX: 32.8 KG/M2

## 2022-03-04 DIAGNOSIS — G51.31 HEMIFACIAL SPASM OF RIGHT SIDE OF FACE: ICD-10-CM

## 2022-03-04 PROCEDURE — 64612 PR DEST,NERVE,FACIAL: ICD-10-PCS | Mod: RT,S$GLB,, | Performed by: PSYCHIATRY & NEUROLOGY

## 2022-03-04 PROCEDURE — 64612 DESTROY NERVE FACE MUSCLE: CPT | Mod: RT,S$GLB,, | Performed by: PSYCHIATRY & NEUROLOGY

## 2022-03-04 NOTE — PROCEDURES
"    MOVEMENT DISORDERS CLINIC     PCP/Referring Provider: Ilda Bailey MD  [address] [place] [address]  Date of Service: 3/4/2022    Chief Complaint: twitching    Interval Hx    Since last visit,   Continues to have facial pulling in her R eye and around R mouth  But much ebtetr covered    Here for botox      "priorHPI: Magalis Rosales is a R HANDED 51 y.o. female with a medical issues significant for hypothyroidism, migraines, who presents with R facial twitching since Summer 2019. She noted her R face and R eye would twitch. She notes also A R ear ringing (episodic) with L cheek pain around the same time. No obvious bell's palsy beforehand. No change to taste. She visited the ER for transient slurred speech September. MRI brain wwo normal and MRA normal. No change to her facial sensation. No trouble swallowing.   No weightloss noted.      Review of Systems:   Review of Systems   Constitutional: Negative for fever.   HENT: Positive for tinnitus. Negative for congestion.    Eyes: Negative for double vision.   Respiratory: Negative for cough and shortness of breath.    Cardiovascular: Negative for chest pain and leg swelling.   Gastrointestinal: Negative for nausea.   Genitourinary: Negative for dysuria.   Musculoskeletal: Negative for falls.   Skin: Negative for rash.   Neurological: Positive for headaches. Negative for tremors, sensory change and speech change.   Psychiatric/Behavioral: Negative for depression.       Neuroleptic exposure:  None    Current Medications:  Outpatient Encounter Medications as of 3/4/2022   Medication Sig Dispense Refill    chlorhexidine (PERIDEX) 0.12 % solution       ergocalciferol (ERGOCALCIFEROL) 50,000 unit Cap Take 1 capsule (50,000 Units total) by mouth every 7 days. 12 capsule 3    FEROSUL 325 mg (65 mg iron) Tab tablet TAKE ONE TABLET BY MOUTH DAILY WITH BREAKFAST 30 tablet 11    fluocinolone acetonide oiL 0.01 % Drop Apply 2-3 drops into both ears twice a " day for 7 days and then prn for itchiness. 1 Bottle 0    fluocinonide (LIDEX) 0.05 % ointment Apply to affected skin twice daily for five days 15 g 0    levothyroxine (SYNTHROID) 200 MCG tablet Take 1 tablet (200 mcg total) by mouth before breakfast. 30 tablet 11    methylPREDNISolone (MEDROL DOSEPACK) 4 mg tablet use as directed 1 each 0    naproxen (NAPROSYN) 500 MG tablet Take 1 tablet (500 mg total) by mouth 2 (two) times daily as needed. 30 tablet 0    sertraline (ZOLOFT) 100 MG tablet Take 1.5 tablets (150 mg total) by mouth once daily. 135 tablet 1    sumatriptan (IMITREX) 50 MG tablet TAKE 1 TABLET BY MOUTH AT ONSET OF HEADACHE, MAY REPEAT IN 2 HOURS. NOT MORE THAN 2 IN 24 HOURS 30 tablet 0    topiramate (TOPAMAX) 100 MG tablet Take 1 tablet (100 mg total) by mouth 2 (two) times daily. 60 tablet 5    triamcinolone acetonide 0.1% (KENALOG) 0.1 % ointment Apply topically 2 (two) times daily as needed (rash). 30 g 0     Facility-Administered Encounter Medications as of 3/4/2022   Medication Dose Route Frequency Provider Last Rate Last Admin    onabotulinumtoxina injection 100 Units  100 Units Intramuscular q12 weeks Ilda Bailey MD   100 Units at 21 1722       Past Medical History:  Patient Active Problem List   Diagnosis    Uterine fibroid    Dysarthria    Acquired hypothyroidism    Migraine without status migrainosus, not intractable    Bell's palsy    Hemifacial spasm    Uterine leiomyoma    IMB (intermenstrual bleeding)    Preop examination    Preop testing    S/P laparoscopic hysterectomy       Past Surgical History:  Past Surgical History:   Procedure Laterality Date    APPENDECTOMY       SECTION      HYSTERECTOMY      HYSTEROSCOPY WITH DILATION AND CURETTAGE OF UTERUS N/A 2020    Procedure: HYSTEROSCOPY, WITH DILATION AND CURETTAGE OF UTERUS;  Surgeon: Nesha Skaggs Mai, MD;  Location: [facility] OR;  Service: OB/GYN;  Laterality: N/A;  RN PRE OP 6- COVID &  "T&S 6-. CA  H/P incomplete----UPT    LAPAROSCOPIC SALPINGECTOMY Bilateral 1/26/2021    Procedure: SALPINGECTOMY, LAPAROSCOPIC;  Surgeon: Nesha Skaggs Mai, MD;  Location: [facility] OR;  Service: OB/GYN;  Laterality: Bilateral;    LAPAROSCOPIC TOTAL HYSTERECTOMY N/A 1/26/2021    Procedure: HYSTERECTOMY, TOTAL, LAPAROSCOPIC;  Surgeon: Nesha Skaggs Mai, MD;  Location: [facility] OR;  Service: OB/GYN;  Laterality: N/A;  RN PREOP 1/25/21,-- COVID NEGATIVE ON 1/25-- T/S, and HCG quant done  with bilateral salpingectomy and cystoscopy  NEED CONSENTS    LASER ABLATION OF THE CERVIX         Current Living Situation: home    Social:  Social History     Socioeconomic History    Marital status: Single    Number of children: 1   Occupational History    Occupation: Unemployed - substitute teaching   Tobacco Use    Smoking status: Never Smoker    Smokeless tobacco: Never Used   Substance and Sexual Activity    Alcohol use: No    Drug use: No    Sexual activity: Not Currently     Partners: Male       Family History:  Family History   Problem Relation Age of Onset    Hypertension Mother     Hypertension Father     Stroke Father     Ovarian cancer Paternal Grandmother        PHYSICAL:  /79   Pulse 70   Wt 84 kg (185 lb 3 oz)   LMP 01/21/2021 (Approximate)   BMI 32.80 kg/m²     Focused exam  Mild HFS R in V1-2-3 - eye closue is more prominent    "General Medical Examination:  General: Good hygiene, appropriate appearance.  HEENT: Normocephalic, atraumatic.   Neck: Supple.   Chest: Unlabored breathing.   CV: Symmetric pulses.   Ext: No clubbing, cyanosis, or edema.     Mental Status:  Mood/Affect: Appropriate/congruent.  Level of consciousness: Awake, alert.  Orientation: Oriented to person, place, time and situation.  Language: No Dysarthria    Cranial nerves:  I: Not tested  II: PERRL, VFF to counting  III, IV, VI: EOMI with conjugate gaze and no nystagmus on end gaze  V: Facial sensation intact and symmetric over " "the bilateral V1-V3  VII: Facial muscle activation Decreased On R  VIII: Hearing intact in the b/l ears and symmetrical to finger rub  IX, X, XII: TUP midline - no atrophy or fasiculations  X: SCMs and shoulder shrug full strength b/l and symmetric        Motor:   -UE: 5/5 deltoids; 5/5 biceps, triceps; 5/5 wrist flexors, extensors; 5/5 interosseous; 5/5   -LEs: 5/5 hip flexion, extension; 5/5 knee flexion, extension; 5/5 ankle flexion, extension      DTRs:  ? Biceps Triceps Brachioradialis Knee Ankle   Left 2+ 2+ 2+ 2+ 2+   Right 2+ 2+ 2+ 2+ 2+     ? Finger taps Finger flicks ALMA Heel taps   Left 0 0 0 0   Right 0 0 0 0   Neck tone: 0  ? Arm Leg   Left 0 0   Right 0 0     Sensation:   -Light touch: Intact and symmetric in the bilateral upper and lower extremities.  -Temp: Intact and symmetric in the bilateral upper and lower extremities.  -Vibration: Intact and symmetric in the bilateral upper and lower extremities.  -Pin prick: Intact and symmetric in the bilateral upper and lower extremities.  -Proprioception: Intact and symmetric in the bilateral upper and lower extremities.    Coordination:   -Finger to nose: 0  -Heel to shin: 0    Gait:  -Arises from chair without use of hands.  -Casual gait is: nl based  -Stride length: nl"    Laboratory Data:  NA    Imaging:  No central lesions or peripheral lesions to explain bell's palsy  Spoke with neuro-radiology who corroborated no peripheral or central lesions    Procedure:   Patient was prepped in usual fashion with alcohol pads.     100 units of onobotulinum toxin was prepared  68 units were wasted.   Muscles and doses that were injected:     Patients signed consent forms and we answered all questions about risks and benefits of botox injections. They agreed to the procedure.  ? L R   ?orbicularis oculi superior lateral ? ?2   ?orbicularis oculi superior lateral ? ?2   orbicularis oculi  lateral  2   ?orbicularis oculi inferior lateral ? ?2   orbicularis oculi " inferior lateral  2   Mentalis   +5   rhizorus  +5   Orbicularis oris  +2   Above eybrow  5U   ?nasalis ? ?5U   The patient tolerated the procedure well and there were no immediate complications following the procedure.         Assessment//Plan:   Problem List Items Addressed This Visit        Neuro    Hemifacial spasm    Overview     From Bell's palsy           Current Assessment & Plan     Did well with botox for hemifacial spasm  We restarted lower facial injections fir prominent pulling                 Follow-up: 6mos  Ilda Bailey MD, MS [facility]  Department of Neurology  Movement Disorders      Procedures

## 2022-03-04 NOTE — ASSESSMENT & PLAN NOTE
Did well with botox for hemifacial spasm  We restarted lower facial injections fir prominent pulling

## 2022-03-06 ENCOUNTER — PATIENT MESSAGE (OUTPATIENT)
Dept: NEUROLOGY | Facility: CLINIC | Age: 52
End: 2022-03-06
Payer: COMMERCIAL

## 2022-03-24 ENCOUNTER — OFFICE VISIT (OUTPATIENT)
Dept: URGENT CARE | Facility: CLINIC | Age: 52
End: 2022-03-24
Payer: COMMERCIAL

## 2022-03-24 VITALS
HEART RATE: 86 BPM | TEMPERATURE: 98 F | DIASTOLIC BLOOD PRESSURE: 87 MMHG | SYSTOLIC BLOOD PRESSURE: 130 MMHG | HEIGHT: 63 IN | WEIGHT: 185 LBS | BODY MASS INDEX: 32.78 KG/M2 | OXYGEN SATURATION: 98 % | RESPIRATION RATE: 17 BRPM

## 2022-03-24 DIAGNOSIS — B34.9 ACUTE VIRAL SYNDROME: Primary | ICD-10-CM

## 2022-03-24 LAB
CTP QC/QA: YES
CTP QC/QA: YES
POC MOLECULAR INFLUENZA A AGN: NEGATIVE
POC MOLECULAR INFLUENZA B AGN: NEGATIVE
SARS-COV-2 RDRP RESP QL NAA+PROBE: NEGATIVE

## 2022-03-24 PROCEDURE — U0002 COVID-19 LAB TEST NON-CDC: HCPCS | Mod: QW,S$GLB,, | Performed by: NURSE PRACTITIONER

## 2022-03-24 PROCEDURE — 3008F BODY MASS INDEX DOCD: CPT | Mod: CPTII,S$GLB,, | Performed by: NURSE PRACTITIONER

## 2022-03-24 PROCEDURE — U0002: ICD-10-PCS | Mod: QW,S$GLB,, | Performed by: NURSE PRACTITIONER

## 2022-03-24 PROCEDURE — 3075F PR MOST RECENT SYSTOLIC BLOOD PRESS GE 130-139MM HG: ICD-10-PCS | Mod: CPTII,S$GLB,, | Performed by: NURSE PRACTITIONER

## 2022-03-24 PROCEDURE — 1160F RVW MEDS BY RX/DR IN RCRD: CPT | Mod: CPTII,S$GLB,, | Performed by: NURSE PRACTITIONER

## 2022-03-24 PROCEDURE — 3079F DIAST BP 80-89 MM HG: CPT | Mod: CPTII,S$GLB,, | Performed by: NURSE PRACTITIONER

## 2022-03-24 PROCEDURE — 1159F PR MEDICATION LIST DOCUMENTED IN MEDICAL RECORD: ICD-10-PCS | Mod: CPTII,S$GLB,, | Performed by: NURSE PRACTITIONER

## 2022-03-24 PROCEDURE — 1160F PR REVIEW ALL MEDS BY PRESCRIBER/CLIN PHARMACIST DOCUMENTED: ICD-10-PCS | Mod: CPTII,S$GLB,, | Performed by: NURSE PRACTITIONER

## 2022-03-24 PROCEDURE — 3075F SYST BP GE 130 - 139MM HG: CPT | Mod: CPTII,S$GLB,, | Performed by: NURSE PRACTITIONER

## 2022-03-24 PROCEDURE — 3008F PR BODY MASS INDEX (BMI) DOCUMENTED: ICD-10-PCS | Mod: CPTII,S$GLB,, | Performed by: NURSE PRACTITIONER

## 2022-03-24 PROCEDURE — 99213 OFFICE O/P EST LOW 20 MIN: CPT | Mod: S$GLB,CS,, | Performed by: NURSE PRACTITIONER

## 2022-03-24 PROCEDURE — 1159F MED LIST DOCD IN RCRD: CPT | Mod: CPTII,S$GLB,, | Performed by: NURSE PRACTITIONER

## 2022-03-24 PROCEDURE — 99213 PR OFFICE/OUTPT VISIT, EST, LEVL III, 20-29 MIN: ICD-10-PCS | Mod: S$GLB,CS,, | Performed by: NURSE PRACTITIONER

## 2022-03-24 PROCEDURE — 87502 POCT INFLUENZA A/B MOLECULAR: ICD-10-PCS | Mod: QW,S$GLB,, | Performed by: NURSE PRACTITIONER

## 2022-03-24 PROCEDURE — 3079F PR MOST RECENT DIASTOLIC BLOOD PRESSURE 80-89 MM HG: ICD-10-PCS | Mod: CPTII,S$GLB,, | Performed by: NURSE PRACTITIONER

## 2022-03-24 PROCEDURE — 87502 INFLUENZA DNA AMP PROBE: CPT | Mod: QW,S$GLB,, | Performed by: NURSE PRACTITIONER

## 2022-03-24 RX ORDER — CETIRIZINE HYDROCHLORIDE 10 MG/1
10 TABLET ORAL DAILY
Qty: 30 TABLET | Refills: 0 | Status: SHIPPED | OUTPATIENT
Start: 2022-03-24 | End: 2022-06-24

## 2022-03-24 RX ORDER — AZELASTINE 1 MG/ML
1 SPRAY, METERED NASAL 2 TIMES DAILY
Qty: 30 ML | Refills: 0 | Status: SHIPPED | OUTPATIENT
Start: 2022-03-24 | End: 2022-04-03

## 2022-03-24 RX ORDER — ONDANSETRON 4 MG/1
4 TABLET, ORALLY DISINTEGRATING ORAL EVERY 6 HOURS PRN
Qty: 30 TABLET | Refills: 0 | Status: SHIPPED | OUTPATIENT
Start: 2022-03-24 | End: 2022-06-24

## 2022-03-24 RX ORDER — GUAIFENESIN 600 MG/1
1200 TABLET, EXTENDED RELEASE ORAL 2 TIMES DAILY
Qty: 40 TABLET | Refills: 0 | Status: SHIPPED | OUTPATIENT
Start: 2022-03-24 | End: 2022-04-03

## 2022-03-24 RX ORDER — PROMETHAZINE HYDROCHLORIDE AND DEXTROMETHORPHAN HYDROBROMIDE 6.25; 15 MG/5ML; MG/5ML
5 SYRUP ORAL NIGHTLY PRN
Qty: 118 ML | Refills: 0 | Status: SHIPPED | OUTPATIENT
Start: 2022-03-24 | End: 2022-11-07

## 2022-03-24 RX ORDER — FLUTICASONE PROPIONATE 50 MCG
1 SPRAY, SUSPENSION (ML) NASAL 2 TIMES DAILY
Qty: 9.9 ML | Refills: 0 | Status: SHIPPED | OUTPATIENT
Start: 2022-03-24 | End: 2022-11-07

## 2022-03-24 NOTE — PROGRESS NOTES
"Subjective:       Patient ID: Magalis Rosales is a 51 y.o. female.    Vitals:  height is 5' 3" (1.6 m) and weight is 83.9 kg (185 lb). Her temperature is 98.4 °F (36.9 °C). Her blood pressure is 130/87 and her pulse is 86. Her respiration is 17 and oxygen saturation is 98%.     Chief Complaint: No chief complaint on file.    51-year-old female presents to clinic for evaluation of nasal congestion, ear pressure, cough, chest congestion, nausea, and generalized abdominal pain x2 days.  Patient is vaccinated for COVID, recently had COVID in January 22. She denies any obvious sick contacts, however she states she works as a .  She reports taking some over-the-counter cough and cold medications with minimal relief of symptoms.  She denies chest pain, fever, shortness of breath.  She is awake and alert, answers questions appropriately, no acute distress noted on today's visit.      URI   This is a new problem. The current episode started in the past 7 days. The problem has been unchanged. There has been no fever. Associated symptoms include abdominal pain, congestion, coughing, ear pain, nausea and a sore throat. Pertinent negatives include no chest pain, diarrhea or vomiting. She has tried decongestant for the symptoms. The treatment provided no relief.       Constitution: Negative for activity change, appetite change, chills, sweating, fatigue and fever.   HENT: Positive for ear pain, congestion and sore throat.    Cardiovascular: Negative for chest pain, palpitations and sob on exertion.   Respiratory: Positive for cough. Negative for shortness of breath.    Gastrointestinal: Positive for abdominal pain and nausea. Negative for vomiting, constipation and diarrhea.   Neurological: Negative for dizziness, numbness and tingling.       Objective:      Physical Exam   Constitutional: She is oriented to person, place, and time. She appears well-developed.  Non-toxic appearance. She does not appear ill. No " distress.   HENT:   Head: Normocephalic and atraumatic. Head is without abrasion, without contusion and without laceration.   Ears:   Right Ear: Tympanic membrane and external ear normal. Impacted cerumen: large amount of cerumen, TM visable and clear.  Left Ear: Tympanic membrane and external ear normal.   Nose: Congestion present. No rhinorrhea.   Mouth/Throat: Mucous membranes are normal. Mucous membranes are moist. Posterior oropharyngeal erythema present. No oropharyngeal exudate. Oropharynx is clear.   Eyes: Conjunctivae, EOM and lids are normal. Right eye exhibits no discharge. Left eye exhibits no discharge.   Neck: Trachea normal and phonation normal. Neck supple.   Cardiovascular: Normal rate, regular rhythm and normal heart sounds.   Pulmonary/Chest: Effort normal and breath sounds normal. No stridor. No respiratory distress. She has no wheezes.   Abdominal: Normal appearance. There is abdominal tenderness (mild epigastric tenderness). There is no rebound and no guarding.   Musculoskeletal: Normal range of motion.         General: Normal range of motion.   Neurological: She is alert and oriented to person, place, and time.   Skin: Skin is warm, dry, intact, not diaphoretic and not pale. No abrasion, No burn and No ecchymosis   Psychiatric: Her speech is normal and behavior is normal. Mood, judgment and thought content normal.   Nursing note and vitals reviewed.        Assessment:       1. Acute viral syndrome          Plan:         Acute viral syndrome  -     POCT COVID-19 Rapid Screening  -     POCT Influenza A/B MOLECULAR  -     cetirizine (ZYRTEC) 10 MG tablet; Take 1 tablet (10 mg total) by mouth once daily.  Dispense: 30 tablet; Refill: 0  -     fluticasone propionate (FLONASE) 50 mcg/actuation nasal spray; 1 spray (50 mcg total) by Each Nostril route 2 (two) times daily.  Dispense: 9.9 mL; Refill: 0  -     azelastine (ASTELIN) 137 mcg (0.1 %) nasal spray; 1 spray (137 mcg total) by Nasal route 2  (two) times daily. for 10 days  Dispense: 30 mL; Refill: 0  -     guaiFENesin (MUCINEX) 600 mg 12 hr tablet; Take 2 tablets (1,200 mg total) by mouth 2 (two) times daily. for 10 days  Dispense: 40 tablet; Refill: 0  -     promethazine-dextromethorphan (PROMETHAZINE-DM) 6.25-15 mg/5 mL Syrp; Take 5 mLs by mouth nightly as needed (cough).  Dispense: 118 mL; Refill: 0  -     ondansetron (ZOFRAN-ODT) 4 MG TbDL; Take 1 tablet (4 mg total) by mouth every 6 (six) hours as needed (nausea).  Dispense: 30 tablet; Refill: 0    - Discussed negative COVID and negative influenza test.  Symptomatic care.  Follow-up with PCP as needed, return to clinic as needed.  ER precautions discussed.  Patient verbalized understanding and is in agreement with plan.         Patient Instructions   - You must understand that you have received an Urgent Care treatment only and that you may be released before all of your medical problems are known or treated.   - You, the patient, will arrange for follow up care as instructed.   - If your condition worsens or fails to improve we recommend that you receive another evaluation at the ER immediately or contact your PCP to discuss your concerns or return here.     URI    - Rest.    - Drink plenty of fluids.      - Viral upper respiratory infections typically run their course in 10-14 days.      - Tylenol or Ibuprofen as directed as needed for fever/pain. Avoid tylenol if you have a history of liver disease. Do not take ibuprofen if you have a history of GI bleeding, kidney disease, or if you take blood thinners.   - Take ibuprofen 600-800 mg every 6-8 hours for pain and inflammation.  You can also take Tylenol/acetaminophen 650-1000 mg every 6-8 hours for added pain relief.     - You can take over-the-counter claritin, zyrtec, allegra, or xyzal as directed. These are antihistamines that can help with runny nose, nasal congestion, sneezing, and helps to dry up post-nasal drip, which usually causes sore  throat and cough.              - If you do NOT have high blood pressure, you may use a decongestant form (D)  of this medication or if you do not take the D form, you can take sudafed (pseudoephedrine) over the counter, which is a decongestant.     - You can use Flonase (fluticasone) nasal spray as directed for sinus congestion and postnasal drip. This is a steroid nasal spray that works locally over time to decrease the inflammation in your nose/sinuses and help with allergic symptoms. This is not an quick- relief spray like afrin, but it works well if used daily.  Discontinue if you develop nose bleed  - use nasal saline prior to Flonase.     - Use Ocean Spray Nasal Saline 1-3 puffs each nostril every 2-3 hours then blow out onto tissue. This is to irrigate the nasal passage way to clear the sinus openings. Use until sinus problem resolved.     - you can take plain Mucinex (guaifenesin) 1200 mg twice a day to help loosen mucous     -warm salt water gargles can help with sore throat     - warm tea with honey can help with cough. Honey is a natural cough suppressant.     - Follow up with your PCP or specialty clinic as directed in the next 1-2 weeks if not improved or as needed.  You can call [phone] to schedule an appointment with the appropriate provider.       - Go to the ER if you develop new or worsening symptoms.

## 2022-03-24 NOTE — PATIENT INSTRUCTIONS

## 2022-03-24 NOTE — LETTER
March 24, 2022      [facility] [place]  [address]  [place] [address]  Phone: [phone]  Fax: [phone]       Patient: Magalis Rosales   YOB: 1970  Date of Visit: 03/24/2022    To Whom It May Concern:    Martha Rosales  was at [facility] on 03/24/2022. The patient may return to work/school on 3/26/2022. If you have any questions or concerns, or if I can be of further assistance, please do not hesitate to contact me.    Sincerely,    Nirav Del Real NP

## 2022-03-26 DIAGNOSIS — G43.829 MENSTRUAL MIGRAINE WITHOUT STATUS MIGRAINOSUS, NOT INTRACTABLE: ICD-10-CM

## 2022-03-28 RX ORDER — TOPIRAMATE 100 MG/1
TABLET, FILM COATED ORAL
Qty: 60 TABLET | Refills: 5 | Status: SHIPPED | OUTPATIENT
Start: 2022-03-28 | End: 2022-04-20

## 2022-04-20 DIAGNOSIS — G43.829 MENSTRUAL MIGRAINE WITHOUT STATUS MIGRAINOSUS, NOT INTRACTABLE: ICD-10-CM

## 2022-04-20 RX ORDER — TOPIRAMATE 100 MG/1
TABLET, FILM COATED ORAL
Qty: 60 TABLET | Refills: 5 | Status: SHIPPED | OUTPATIENT
Start: 2022-04-20 | End: 2022-11-07

## 2022-05-03 DIAGNOSIS — G43.829 MENSTRUAL MIGRAINE WITHOUT STATUS MIGRAINOSUS, NOT INTRACTABLE: ICD-10-CM

## 2022-05-03 RX ORDER — SUMATRIPTAN 50 MG/1
50 TABLET, FILM COATED ORAL EVERY 8 HOURS PRN
Qty: 27 TABLET | Refills: 0 | Status: SHIPPED | OUTPATIENT
Start: 2022-05-03 | End: 2022-07-25 | Stop reason: SDUPTHER

## 2022-05-03 NOTE — TELEPHONE ENCOUNTER
No new care gaps identified.  Powered by [employer]. Reference number: [id number].   5/03/2022 4:40:00 AM CDT

## 2022-05-30 ENCOUNTER — TELEPHONE (OUTPATIENT)
Dept: NEUROLOGY | Facility: CLINIC | Age: 52
End: 2022-05-30
Payer: COMMERCIAL

## 2022-05-30 NOTE — TELEPHONE ENCOUNTER
Called and spoke with patient and informed her that appointment scheduled is the earliest appointment available.Patient added to the wait list.

## 2022-05-30 NOTE — TELEPHONE ENCOUNTER
----- Message from Michael Gordon sent at 5/30/2022 10:13 AM CDT -----  Contact: @ [phone]  Patient feels she needs to be seen before the 6-24th appt (she's ok with seeing someone else ) pls advise

## 2022-06-02 ENCOUNTER — OFFICE VISIT (OUTPATIENT)
Dept: DERMATOLOGY | Facility: CLINIC | Age: 52
End: 2022-06-02
Payer: COMMERCIAL

## 2022-06-02 DIAGNOSIS — L65.9 HAIR LOSS: Primary | ICD-10-CM

## 2022-06-02 PROCEDURE — 1159F PR MEDICATION LIST DOCUMENTED IN MEDICAL RECORD: ICD-10-PCS | Mod: CPTII,95,, | Performed by: STUDENT IN AN ORGANIZED HEALTH CARE EDUCATION/TRAINING PROGRAM

## 2022-06-02 PROCEDURE — 99203 PR OFFICE/OUTPT VISIT, NEW, LEVL III, 30-44 MIN: ICD-10-PCS | Mod: 95,,, | Performed by: STUDENT IN AN ORGANIZED HEALTH CARE EDUCATION/TRAINING PROGRAM

## 2022-06-02 PROCEDURE — 1160F RVW MEDS BY RX/DR IN RCRD: CPT | Mod: CPTII,95,, | Performed by: STUDENT IN AN ORGANIZED HEALTH CARE EDUCATION/TRAINING PROGRAM

## 2022-06-02 PROCEDURE — 99203 OFFICE O/P NEW LOW 30 MIN: CPT | Mod: 95,,, | Performed by: STUDENT IN AN ORGANIZED HEALTH CARE EDUCATION/TRAINING PROGRAM

## 2022-06-02 PROCEDURE — 1159F MED LIST DOCD IN RCRD: CPT | Mod: CPTII,95,, | Performed by: STUDENT IN AN ORGANIZED HEALTH CARE EDUCATION/TRAINING PROGRAM

## 2022-06-02 PROCEDURE — 1160F PR REVIEW ALL MEDS BY PRESCRIBER/CLIN PHARMACIST DOCUMENTED: ICD-10-PCS | Mod: CPTII,95,, | Performed by: STUDENT IN AN ORGANIZED HEALTH CARE EDUCATION/TRAINING PROGRAM

## 2022-06-02 RX ORDER — CLOBETASOL PROPIONATE 0.46 MG/ML
SOLUTION TOPICAL DAILY
Qty: 50 ML | Refills: 3 | Status: SHIPPED | OUTPATIENT
Start: 2022-06-02 | End: 2023-02-21 | Stop reason: SDUPTHER

## 2022-06-02 NOTE — PROGRESS NOTES
The patient location is: home  The chief complaint leading to consultation is: progressive hair loss/thinning    Visit type: audiovisual    Face to Face time with patient: 10mins  10 minutes of total time spent on the encounter, which includes face to face time and non-face to face time preparing to see the patient (eg, review of tests), Obtaining and/or reviewing separately obtained history, Documenting clinical information in the electronic or other health record, Independently interpreting results (not separately reported) and communicating results to the patient/family/caregiver, or Care coordination (not separately reported).         Each patient to whom he or she provides medical services by telemedicine is:  (1) informed of the relationship between the physician and patient and the respective role of any other health care provider with respect to management of the patient; and (2) notified that he or she may decline to receive medical services by telemedicine and may withdraw from such care at any time.    Notes: Patient presents for worsening hair loss and thinning. Patient reports having hair loss for several months. Has had this in the past but symptoms always improved. Recently, it is continuing. Reports balding patches on the scalp. In areas of the loss, scalp is burning and itching. Has tired several OTC products all with minimal improvement in symptoms.     ROS: Otherwise negative    PE: const/neuro - AAOx3, NAD          Skin -                   A/P: 1) Hair loss - patient with burning and pruritus of the scalp in areas of loss. Recommend in office evaluation of the area for further workup. For now, will start with topical clobetasol solution daily to the scalp.

## 2022-06-19 DIAGNOSIS — E03.9 HYPOTHYROIDISM, UNSPECIFIED TYPE: ICD-10-CM

## 2022-06-21 NOTE — TELEPHONE ENCOUNTER
Please see the attached refill request. Thyroid is stable, can follow up with me if there are issue

## 2022-06-22 RX ORDER — LEVOTHYROXINE SODIUM 200 UG/1
TABLET ORAL
Qty: 30 TABLET | Refills: 11 | Status: SHIPPED | OUTPATIENT
Start: 2022-06-22 | End: 2022-11-08 | Stop reason: SDUPTHER

## 2022-06-24 ENCOUNTER — PATIENT MESSAGE (OUTPATIENT)
Dept: NEUROLOGY | Facility: CLINIC | Age: 52
End: 2022-06-24

## 2022-06-24 ENCOUNTER — TELEPHONE (OUTPATIENT)
Dept: NEUROLOGY | Facility: CLINIC | Age: 52
End: 2022-06-24
Payer: COMMERCIAL

## 2022-06-24 ENCOUNTER — PROCEDURE VISIT (OUTPATIENT)
Dept: NEUROLOGY | Facility: CLINIC | Age: 52
End: 2022-06-24
Payer: COMMERCIAL

## 2022-06-24 VITALS
DIASTOLIC BLOOD PRESSURE: 85 MMHG | HEIGHT: 63 IN | BODY MASS INDEX: 29.23 KG/M2 | WEIGHT: 165 LBS | HEART RATE: 60 BPM | SYSTOLIC BLOOD PRESSURE: 122 MMHG

## 2022-06-24 DIAGNOSIS — G51.31 HEMIFACIAL SPASM OF RIGHT SIDE OF FACE: ICD-10-CM

## 2022-06-24 PROCEDURE — 64612 DESTROY NERVE FACE MUSCLE: CPT | Mod: RT,S$GLB,, | Performed by: PSYCHIATRY & NEUROLOGY

## 2022-06-24 PROCEDURE — 64612 PR DEST,NERVE,FACIAL: ICD-10-PCS | Mod: RT,S$GLB,, | Performed by: PSYCHIATRY & NEUROLOGY

## 2022-06-24 NOTE — TELEPHONE ENCOUNTER
----- Message from Beckie Armas sent at 6/24/2022  2:39 PM CDT -----  Contact: [phone]  Type:  Needs Medical Advice    Who Called: Pt   Reason for call: regarding rescheduling her Botox appt on 10/3 to possible on 10/10  Would the patient rather a call back or a response via [facility]? Call back  Best Call Back Number: [phone]

## 2022-06-24 NOTE — PROCEDURES
"    [facility]     PCP/Referring Provider: Ilda Bailey MD  [address] [place] [address]  Date of Service: 6/24/2022    Chief Complaint: twitching    Interval Hx    Since last visit,   Continues to have facial pulling in her R eye and around R mouth  But much ebtetr covered    Here for botox      "priorHPI: Magalis Rosales is a R HANDED 51 y.o. female with a medical issues significant for hypothyroidism, migraines, who presents with R facial twitching since Summer 2019. She noted her R face and R eye would twitch. She notes also A R ear ringing (episodic) with L cheek pain around the same time. No obvious bell's palsy beforehand. No change to taste. She visited the ER for transient slurred speech September. MRI brain wwo normal and MRA normal. No change to her facial sensation. No trouble swallowing.   No weightloss noted.      Review of Systems:   Review of Systems   Constitutional: Negative for fever.   HENT: Positive for tinnitus. Negative for congestion.    Eyes: Negative for double vision.   Respiratory: Negative for cough and shortness of breath.    Cardiovascular: Negative for chest pain and leg swelling.   Gastrointestinal: Negative for nausea.   Genitourinary: Negative for dysuria.   Musculoskeletal: Negative for falls.   Skin: Negative for rash.   Neurological: Positive for headaches. Negative for tremors, sensory change and speech change.   Psychiatric/Behavioral: Negative for depression.       Neuroleptic exposure:  None    Current Medications:  Outpatient Encounter Medications as of 6/24/2022   Medication Sig Dispense Refill    sertraline (ZOLOFT) 100 MG tablet Take 1.5 tablets (150 mg total) by mouth once daily. 135 tablet 1    sumatriptan (IMITREX) 50 MG tablet Take 1 tablet (50 mg total) by mouth every 8 (eight) hours as needed for Migraine. 27 tablet 0    SYNTHROID 200 mcg tablet TAKE 1 TABLET (200 MCG TOTAL) BY MOUTH BEFORE BREAKFAST. 30 tablet 11    azelastine " (ASTELIN) 137 mcg (0.1 %) nasal spray 1 spray (137 mcg total) by Nasal route 2 (two) times daily. for 10 days 30 mL 0    chlorhexidine (PERIDEX) 0.12 % solution       clobetasoL (TEMOVATE) 0.05 % external solution Apply topically once daily. Apply to the scalp (Patient not taking: Reported on 6/24/2022) 50 mL 3    ergocalciferol (ERGOCALCIFEROL) 50,000 unit Cap Take 1 capsule (50,000 Units total) by mouth every 7 days. (Patient not taking: No sig reported) 12 capsule 3    fluocinolone acetonide oiL 0.01 % Drop Apply 2-3 drops into both ears twice a day for 7 days and then prn for itchiness. (Patient not taking: No sig reported) 1 Bottle 0    fluocinonide (LIDEX) 0.05 % ointment Apply to affected skin twice daily for five days (Patient not taking: No sig reported) 15 g 0    fluticasone propionate (FLONASE) 50 mcg/actuation nasal spray 1 spray (50 mcg total) by Each Nostril route 2 (two) times daily. (Patient not taking: Reported on 6/24/2022) 9.9 mL 0    promethazine-dextromethorphan (PROMETHAZINE-DM) 6.25-15 mg/5 mL Syrp Take 5 mLs by mouth nightly as needed (cough). (Patient not taking: Reported on 6/24/2022) 118 mL 0    topiramate (TOPAMAX) 100 MG tablet TAKE ONE TABLET BY MOUTH TWICE DAILY 60 tablet 5    triamcinolone acetonide 0.1% (KENALOG) 0.1 % ointment Apply topically 2 (two) times daily as needed (rash). (Patient not taking: No sig reported) 30 g 0    [DISCONTINUED] cetirizine (ZYRTEC) 10 MG tablet Take 1 tablet (10 mg total) by mouth once daily. 30 tablet 0    [DISCONTINUED] FEROSUL 325 mg (65 mg iron) Tab tablet TAKE ONE TABLET BY MOUTH DAILY WITH BREAKFAST (Patient not taking: Reported on 3/24/2022) 30 tablet 11    [DISCONTINUED] methylPREDNISolone (MEDROL DOSEPACK) 4 mg tablet use as directed 1 each 0    [DISCONTINUED] naproxen (NAPROSYN) 500 MG tablet Take 1 tablet (500 mg total) by mouth 2 (two) times daily as needed. (Patient not taking: Reported on 3/24/2022) 30 tablet 0     [DISCONTINUED] ondansetron (ZOFRAN-ODT) 4 MG TbDL Take 1 tablet (4 mg total) by mouth every 6 (six) hours as needed (nausea). 30 tablet 0     Facility-Administered Encounter Medications as of 2022   Medication Dose Route Frequency Provider Last Rate Last Admin    onabotulinumtoxina injection 100 Units  100 Units Intramuscular q12 weeks Ilda Bailey MD   100 Units at 22 [id number]       Past Medical History:  Patient Active Problem List   Diagnosis    Uterine fibroid    Dysarthria    Acquired hypothyroidism    Migraine without status migrainosus, not intractable    Bell's palsy    Hemifacial spasm    Uterine leiomyoma    IMB (intermenstrual bleeding)    Preop examination    Preop testing    S/P laparoscopic hysterectomy       Past Surgical History:  Past Surgical History:   Procedure Laterality Date    APPENDECTOMY       SECTION      HYSTERECTOMY      HYSTEROSCOPY WITH DILATION AND CURETTAGE OF UTERUS N/A 2020    Procedure: HYSTEROSCOPY, WITH DILATION AND CURETTAGE OF UTERUS;  Surgeon: Nesha Skaggs Mai, MD;  Location: [facility] OR;  Service: OB/GYN;  Laterality: N/A;  RN PRE OP 6- COVID & T&S 2020. [place]  H/P incomplete----UPT    LAPAROSCOPIC SALPINGECTOMY Bilateral 2021    Procedure: SALPINGECTOMY, LAPAROSCOPIC;  Surgeon: Nesha Skaggs Mai, MD;  Location: [facility] OR;  Service: OB/GYN;  Laterality: Bilateral;    LAPAROSCOPIC TOTAL HYSTERECTOMY N/A 2021    Procedure: HYSTERECTOMY, TOTAL, LAPAROSCOPIC;  Surgeon: Nesha Skaggs Mai, MD;  Location: [facility] OR;  Service: OB/GYN;  Laterality: N/A;  RN PREOP 21,-- COVID NEGATIVE ON -- T/S, and HCG quant done  with bilateral salpingectomy and cystoscopy  NEED CONSENTS    LASER ABLATION OF THE CERVIX         Current Living Situation: home    Social:  Social History     Socioeconomic History    Marital status: Single    Number of children: 1   Occupational History    Occupation: Unemployed - substitute teaching  "  Tobacco Use    Smoking status: Never Smoker    Smokeless tobacco: Never Used   Substance and Sexual Activity    Alcohol use: No    Drug use: No    Sexual activity: Not Currently     Partners: Male       Family History:  Family History   Problem Relation Age of Onset    Hypertension Mother     Hypertension Father     Stroke Father     Ovarian cancer Paternal Grandmother        PHYSICAL:  /85 (BP Location: Right arm, Patient Position: Sitting, BP Method: Large (Automatic))   Pulse 60   Ht 5' 3" (1.6 m)   Wt 74.8 kg (165 lb)   LMP 01/21/2021 (Approximate)   BMI 29.23 kg/m²     Focused exam  Mild HFS R in V1-2-3 - eye closue is more prominent    "General Medical Examination:  General: Good hygiene, appropriate appearance.  HEENT: Normocephalic, atraumatic.   Neck: Supple.   Chest: Unlabored breathing.   CV: Symmetric pulses.   Ext: No clubbing, cyanosis, or edema.     Mental Status:  Mood/Affect: Appropriate/congruent.  Level of consciousness: Awake, alert.  Orientation: Oriented to person, place, time and situation.  Language: No Dysarthria    Cranial nerves:  I: Not tested  II: PERRL, VFF to counting  III, IV, VI: EOMI with conjugate gaze and no nystagmus on end gaze  V: Facial sensation intact and symmetric over the bilateral V1-V3  VII: Facial muscle activation Decreased On R  VIII: Hearing intact in the b/l ears and symmetrical to finger rub  IX, X, XII: TUP midline - no atrophy or fasiculations  X: SCMs and shoulder shrug full strength b/l and symmetric        Motor:   -UE: 5/5 deltoids; 5/5 biceps, triceps; 5/5 wrist flexors, extensors; 5/5 interosseous; 5/5   -LEs: 5/5 hip flexion, extension; 5/5 knee flexion, extension; 5/5 ankle flexion, extension      DTRs:  ? Biceps Triceps Brachioradialis Knee Ankle   Left 2+ 2+ 2+ 2+ 2+   Right 2+ 2+ 2+ 2+ 2+     ? Finger taps Finger flicks ALMA Heel taps   Left 0 0 0 0   Right 0 0 0 0   Neck tone: 0  ? Arm Leg   Left 0 0   Right 0 0 " "    Sensation:   -Light touch: Intact and symmetric in the bilateral upper and lower extremities.  -Temp: Intact and symmetric in the bilateral upper and lower extremities.  -Vibration: Intact and symmetric in the bilateral upper and lower extremities.  -Pin prick: Intact and symmetric in the bilateral upper and lower extremities.  -Proprioception: Intact and symmetric in the bilateral upper and lower extremities.    Coordination:   -Finger to nose: 0  -Heel to shin: 0    Gait:  -Arises from chair without use of hands.  -Casual gait is: nl based  -Stride length: nl"    Laboratory Data:  NA    Imaging:  No central lesions or peripheral lesions to explain bell's palsy  Spoke with neuro-radiology who corroborated no peripheral or central lesions    Procedure:   Patient was prepped in usual fashion with alcohol pads.     100 units of onobotulinum toxin was prepared  68 units were wasted.   Muscles and doses that were injected:     Patients signed consent forms and we answered all questions about risks and benefits of botox injections. They agreed to the procedure.  ? L R   ?orbicularis oculi superior lateral ? ?2   ?orbicularis oculi superior lateral ? ?2   orbicularis oculi  lateral  2   ?orbicularis oculi inferior lateral ? ?2   orbicularis oculi inferior lateral  2   Mentalis   5   rhizorus  5   Orbicularis oris  2   Above eybrow  5U   masseter  +5U   ?nasalis ? ?5U   The patient tolerated the procedure well and there were no immediate complications following the procedure.         Assessment//Plan:   Problem List Items Addressed This Visit        Neuro    Hemifacial spasm    Overview     From Bell's palsy           Current Assessment & Plan     Did well with botox for hemifacial spasm                   Follow-up: 6mos  Ilda Bailey MD, MS [facility]  Department of Neurology  Movement Disorders      Procedures      "

## 2022-06-28 ENCOUNTER — LAB VISIT (OUTPATIENT)
Dept: LAB | Facility: HOSPITAL | Age: 52
End: 2022-06-28
Attending: STUDENT IN AN ORGANIZED HEALTH CARE EDUCATION/TRAINING PROGRAM
Payer: COMMERCIAL

## 2022-06-28 ENCOUNTER — OFFICE VISIT (OUTPATIENT)
Dept: DERMATOLOGY | Facility: CLINIC | Age: 52
End: 2022-06-28
Payer: COMMERCIAL

## 2022-06-28 DIAGNOSIS — L65.9 HAIR LOSS: Primary | ICD-10-CM

## 2022-06-28 DIAGNOSIS — L65.9 HAIR LOSS: ICD-10-CM

## 2022-06-28 LAB
ANION GAP SERPL CALC-SCNC: 12 MMOL/L (ref 8–16)
BUN SERPL-MCNC: 19 MG/DL (ref 6–20)
CALCIUM SERPL-MCNC: 9.2 MG/DL (ref 8.7–10.5)
CHLORIDE SERPL-SCNC: 105 MMOL/L (ref 95–110)
CO2 SERPL-SCNC: 21 MMOL/L (ref 23–29)
CREAT SERPL-MCNC: 0.8 MG/DL (ref 0.5–1.4)
EST. GFR  (AFRICAN AMERICAN): >60 ML/MIN/1.73 M^2
EST. GFR  (NON AFRICAN AMERICAN): >60 ML/MIN/1.73 M^2
GLUCOSE SERPL-MCNC: 85 MG/DL (ref 70–110)
POTASSIUM SERPL-SCNC: 4.5 MMOL/L (ref 3.5–5.1)
SODIUM SERPL-SCNC: 138 MMOL/L (ref 136–145)

## 2022-06-28 PROCEDURE — 1159F MED LIST DOCD IN RCRD: CPT | Mod: CPTII,S$GLB,, | Performed by: STUDENT IN AN ORGANIZED HEALTH CARE EDUCATION/TRAINING PROGRAM

## 2022-06-28 PROCEDURE — 99214 PR OFFICE/OUTPT VISIT, EST, LEVL IV, 30-39 MIN: ICD-10-PCS | Mod: S$GLB,,, | Performed by: STUDENT IN AN ORGANIZED HEALTH CARE EDUCATION/TRAINING PROGRAM

## 2022-06-28 PROCEDURE — 99214 OFFICE O/P EST MOD 30 MIN: CPT | Mod: S$GLB,,, | Performed by: STUDENT IN AN ORGANIZED HEALTH CARE EDUCATION/TRAINING PROGRAM

## 2022-06-28 PROCEDURE — 1160F RVW MEDS BY RX/DR IN RCRD: CPT | Mod: CPTII,S$GLB,, | Performed by: STUDENT IN AN ORGANIZED HEALTH CARE EDUCATION/TRAINING PROGRAM

## 2022-06-28 PROCEDURE — 1160F PR REVIEW ALL MEDS BY PRESCRIBER/CLIN PHARMACIST DOCUMENTED: ICD-10-PCS | Mod: CPTII,S$GLB,, | Performed by: STUDENT IN AN ORGANIZED HEALTH CARE EDUCATION/TRAINING PROGRAM

## 2022-06-28 PROCEDURE — 36415 COLL VENOUS BLD VENIPUNCTURE: CPT | Performed by: STUDENT IN AN ORGANIZED HEALTH CARE EDUCATION/TRAINING PROGRAM

## 2022-06-28 PROCEDURE — 99999 PR PBB SHADOW E&M-EST. PATIENT-LVL III: CPT | Mod: PBBFAC,,, | Performed by: STUDENT IN AN ORGANIZED HEALTH CARE EDUCATION/TRAINING PROGRAM

## 2022-06-28 PROCEDURE — 99999 PR PBB SHADOW E&M-EST. PATIENT-LVL III: ICD-10-PCS | Mod: PBBFAC,,, | Performed by: STUDENT IN AN ORGANIZED HEALTH CARE EDUCATION/TRAINING PROGRAM

## 2022-06-28 PROCEDURE — 1159F PR MEDICATION LIST DOCUMENTED IN MEDICAL RECORD: ICD-10-PCS | Mod: CPTII,S$GLB,, | Performed by: STUDENT IN AN ORGANIZED HEALTH CARE EDUCATION/TRAINING PROGRAM

## 2022-06-28 PROCEDURE — 80048 BASIC METABOLIC PNL TOTAL CA: CPT | Performed by: STUDENT IN AN ORGANIZED HEALTH CARE EDUCATION/TRAINING PROGRAM

## 2022-06-28 RX ORDER — CLOBETASOL PROPIONATE 0.46 MG/ML
SOLUTION TOPICAL DAILY
Qty: 50 ML | Refills: 3 | Status: SHIPPED | OUTPATIENT
Start: 2022-06-28

## 2022-06-28 RX ORDER — SPIRONOLACTONE 50 MG/1
50 TABLET, FILM COATED ORAL 2 TIMES DAILY
Qty: 60 TABLET | Refills: 3 | Status: SHIPPED | OUTPATIENT
Start: 2022-06-28 | End: 2022-11-08

## 2022-06-28 NOTE — PROGRESS NOTES
Subjective:       Patient ID:  Magalis Rosales is a 51 y.o. female who presents for   Chief Complaint   Patient presents with    Follow-up     History of Present Illness: The patient presents for follow up of hair loss and thinning, last seen via virtual visit on 6/2/22 where she was started on clobetasol topically. Here for in office evaluation. Reports noticing a little improvement with less hair loss since starting clobetasol. Reports having continued itching of the scalp. Affecting largely on the temporal areas, but also on the top of the scalp.         Review of Systems   Constitutional: Negative for fever and chills.   Skin: Negative for rash.        Objective:    Physical Exam   Constitutional: She appears well-developed and well-nourished. No distress.   Neurological: She is alert and oriented to person, place, and time. She is not disoriented.   Psychiatric: She has a normal mood and affect.   Skin:   Areas Examined (abnormalities noted in diagram):   Scalp / Hair Palpated and Inspected  Head / Face Inspection Performed  Neck Inspection Performed  RUE Inspected  LUE Inspection Performed              Diagram Legend     Erythematous scaling macule/papule c/w actinic keratosis       Vascular papule c/w angioma      Pigmented verrucoid papule/plaque c/w seborrheic keratosis      Yellow umbilicated papule c/w sebaceous hyperplasia      Irregularly shaped tan macule c/w lentigo     1-2 mm smooth white papules consistent with Milia      Movable subcutaneous cyst with punctum c/w epidermal inclusion cyst      Subcutaneous movable cyst c/w pilar cyst      Firm pink to brown papule c/w dermatofibroma      Pedunculated fleshy papule(s) c/w skin tag(s)      Evenly pigmented macule c/w junctional nevus     Mildly variegated pigmented, slightly irregular-bordered macule c/w mildly atypical nevus      Flesh colored to evenly pigmented papule c/w intradermal nevus       Pink pearly papule/plaque c/w basal cell carcinoma       Erythematous hyperkeratotic cursted plaque c/w SCC      Surgical scar with no sign of skin cancer recurrence      Open and closed comedones      Inflammatory papules and pustules      Verrucoid papule consistent consistent with wart     Erythematous eczematous patches and plaques     Dystrophic onycholytic nail with subungual debris c/w onychomycosis     Umbilicated papule    Erythematous-base heme-crusted tan verrucoid plaque consistent with inflamed seborrheic keratosis     Erythematous Silvery Scaling Plaque c/w Psoriasis     See annotation      Assessment / Plan:        Hair loss - appears consistent with CCCA. Some improvement with topical steroids. Discussed treatment options with patient, including oral medications, ILK injections, topicals, etc. Will add spironolactone to regimen.   -     Basic Metabolic Panel; Future; Expected date: 06/28/2022  -     spironolactone (ALDACTONE) 50 MG tablet; Take 1 tablet (50 mg total) by mouth 2 (two) times daily.  Dispense: 60 tablet; Refill: 3  -     clobetasoL (TEMOVATE) 0.05 % external solution; Apply topically once daily.  Dispense: 50 mL; Refill: 3    Discussed benefits and risks of therapy including but not limited to breakthrough bleeding, breast tenderness, and elevated potassium levels which may give symptoms of fatigue, palpitations, and nausea. Patient should limit potassium intake - avoid potassium supplements or salt substitutes, limit bananas and citrus fruits. Pregnancy must be avoided while taking spironolactone.           No follow-ups on file.

## 2022-08-04 DIAGNOSIS — G43.829 MENSTRUAL MIGRAINE WITHOUT STATUS MIGRAINOSUS, NOT INTRACTABLE: ICD-10-CM

## 2022-08-04 RX ORDER — SUMATRIPTAN 50 MG/1
50 TABLET, FILM COATED ORAL EVERY 8 HOURS PRN
Qty: 27 TABLET | Refills: 0 | Status: SHIPPED | OUTPATIENT
Start: 2022-08-04 | End: 2022-10-03 | Stop reason: SDUPTHER

## 2022-08-04 NOTE — TELEPHONE ENCOUNTER
No new care gaps identified.  [facility] Embedded Care Gaps. Reference number: [id number]. 8/04/2022   7:24:40 AM CDT

## 2022-08-04 NOTE — TELEPHONE ENCOUNTER
Refill Decision Note   Magalis Rosales  is requesting a refill authorization.  Brief Assessment and Rationale for Refill:  Approve     Medication Therapy Plan:       Medication Reconciliation Completed: No   Comments:     No Care Gaps recommended.     Note composed:3:58 PM 08/04/2022

## 2022-10-03 ENCOUNTER — PROCEDURE VISIT (OUTPATIENT)
Dept: NEUROLOGY | Facility: CLINIC | Age: 52
End: 2022-10-03
Payer: COMMERCIAL

## 2022-10-03 ENCOUNTER — PATIENT MESSAGE (OUTPATIENT)
Dept: NEUROLOGY | Facility: CLINIC | Age: 52
End: 2022-10-03

## 2022-10-03 VITALS
DIASTOLIC BLOOD PRESSURE: 93 MMHG | BODY MASS INDEX: 34.76 KG/M2 | SYSTOLIC BLOOD PRESSURE: 147 MMHG | WEIGHT: 196.19 LBS | HEART RATE: 64 BPM

## 2022-10-03 DIAGNOSIS — G51.31 HEMIFACIAL SPASM OF RIGHT SIDE OF FACE: ICD-10-CM

## 2022-10-03 PROCEDURE — 64612 DESTROY NERVE FACE MUSCLE: CPT | Mod: RT,S$GLB,, | Performed by: PSYCHIATRY & NEUROLOGY

## 2022-10-03 PROCEDURE — 64612 PR DEST,NERVE,FACIAL: ICD-10-PCS | Mod: RT,S$GLB,, | Performed by: PSYCHIATRY & NEUROLOGY

## 2022-10-03 NOTE — PROCEDURES
"    MOVEMENT DISORDERS CLINIC     PCP/Referring Provider: Ilda Bailey MD  [address] [place] [address]  Date of Service: 10/3/2022    Chief Complaint: twitching    Interval Hx    Since last visit,   Continues to have facial pulling in her R eye and around R mouth  But much better covered  Botox lasts 3 mos  Mild overweakening around R mouth  Here for botox      "priorHPI: Magalis Rosales is a R HANDED 51 y.o. female with a medical issues significant for hypothyroidism, migraines, who presents with R facial twitching since Summer 2019. She noted her R face and R eye would twitch. She notes also A R ear ringing (episodic) with L cheek pain around the same time. No obvious bell's palsy beforehand. No change to taste. She visited the ER for transient slurred speech September. MRI brain wwo normal and MRA normal. No change to her facial sensation. No trouble swallowing.   No weightloss noted.      Review of Systems:   ROS    Neuroleptic exposure:  None    Current Medications:  Outpatient Encounter Medications as of 10/3/2022   Medication Sig Dispense Refill    azelastine (ASTELIN) 137 mcg (0.1 %) nasal spray 1 spray (137 mcg total) by Nasal route 2 (two) times daily. for 10 days 30 mL 0    chlorhexidine (PERIDEX) 0.12 % solution       clobetasoL (TEMOVATE) 0.05 % external solution Apply topically once daily. Apply to the scalp 50 mL 3    clobetasoL (TEMOVATE) 0.05 % external solution Apply topically once daily. 50 mL 3    ergocalciferol (ERGOCALCIFEROL) 50,000 unit Cap Take 1 capsule (50,000 Units total) by mouth every 7 days. (Patient not taking: No sig reported) 12 capsule 3    ferrous sulfate (FEROSUL) 325 mg (65 mg iron) Tab tablet TAKE ONE TABLET BY MOUTH DAILY WITH BREAKFAST 30 tablet 6    fluocinolone acetonide oiL 0.01 % Drop Apply 2-3 drops into both ears twice a day for 7 days and then prn for itchiness. (Patient not taking: No sig reported) 1 Bottle 0    fluocinonide (LIDEX) 0.05 % ointment " Apply to affected skin twice daily for five days (Patient not taking: No sig reported) 15 g 0    fluticasone propionate (FLONASE) 50 mcg/actuation nasal spray 1 spray (50 mcg total) by Each Nostril route 2 (two) times daily. (Patient not taking: No sig reported) 9.9 mL 0    promethazine-dextromethorphan (PROMETHAZINE-DM) 6.25-15 mg/5 mL Syrp Take 5 mLs by mouth nightly as needed (cough). (Patient not taking: No sig reported) 118 mL 0    sertraline (ZOLOFT) 100 MG tablet TAKE 1.5 TABLETS (150 MG TOTAL) BY MOUTH ONCE DAILY. 135 tablet 0    spironolactone (ALDACTONE) 50 MG tablet Take 1 tablet (50 mg total) by mouth 2 (two) times daily. 60 tablet 3    sumatriptan (IMITREX) 50 MG tablet TAKE 1 TABLET (50 MG TOTAL) BY MOUTH EVERY 8 (EIGHT) HOURS AS NEEDED FOR MIGRAINE. 27 tablet 0    SYNTHROID 200 mcg tablet TAKE 1 TABLET (200 MCG TOTAL) BY MOUTH BEFORE BREAKFAST. 30 tablet 11    topiramate (TOPAMAX) 100 MG tablet TAKE ONE TABLET BY MOUTH TWICE DAILY 60 tablet 5    triamcinolone acetonide 0.1% (KENALOG) 0.1 % ointment Apply topically 2 (two) times daily as needed (rash). (Patient not taking: No sig reported) 30 g 0     Facility-Administered Encounter Medications as of 10/3/2022   Medication Dose Route Frequency Provider Last Rate Last Admin    onabotulinumtoxina injection 100 Units  100 Units Intramuscular q12 weeks Ilda Bailey MD   100 Units at 10/03/22 0947       Past Medical History:  Patient Active Problem List   Diagnosis    Uterine fibroid    Dysarthria    Acquired hypothyroidism    Migraine without status migrainosus, not intractable    Bell's palsy    Hemifacial spasm    Uterine leiomyoma    IMB (intermenstrual bleeding)    Preop examination    Preop testing    S/P laparoscopic hysterectomy       Past Surgical History:  Past Surgical History:   Procedure Laterality Date    APPENDECTOMY       SECTION      HYSTERECTOMY      HYSTEROSCOPY WITH DILATION AND CURETTAGE OF UTERUS N/A 2020    Procedure:  "HYSTEROSCOPY, WITH DILATION AND CURETTAGE OF UTERUS;  Surgeon: Nesha Skaggs Mai, MD;  Location: [facility] OR;  Service: OB/GYN;  Laterality: N/A;  RN PRE OP 6- COVID & T&S 6-. CA  H/P incomplete----UPT    LAPAROSCOPIC SALPINGECTOMY Bilateral 1/26/2021    Procedure: SALPINGECTOMY, LAPAROSCOPIC;  Surgeon: Nesha Skaggs Mai, MD;  Location: [facility] OR;  Service: OB/GYN;  Laterality: Bilateral;    LAPAROSCOPIC TOTAL HYSTERECTOMY N/A 1/26/2021    Procedure: HYSTERECTOMY, TOTAL, LAPAROSCOPIC;  Surgeon: Nesha Skaggs Mai, MD;  Location: [facility] OR;  Service: OB/GYN;  Laterality: N/A;  RN PREOP 1/25/21,-- COVID NEGATIVE ON 1/25-- T/S, and HCG quant done  with bilateral salpingectomy and cystoscopy  NEED CONSENTS    LASER ABLATION OF THE CERVIX         Current Living Situation: home    Social:  Social History     Socioeconomic History    Marital status: Single    Number of children: 1   Occupational History    Occupation: Unemployed - substitute teaching   Tobacco Use    Smoking status: Never    Smokeless tobacco: Never   Substance and Sexual Activity    Alcohol use: No    Drug use: No    Sexual activity: Not Currently     Partners: Male       Family History:  Family History   Problem Relation Age of Onset    Hypertension Mother     Hypertension Father     Stroke Father     Ovarian cancer Paternal Grandmother        PHYSICAL:  BP (!) 147/93   Pulse 64   Wt 89 kg (196 lb 3.4 oz)   LMP 01/21/2021 (Approximate)   BMI 34.76 kg/m²     Focused exam  Mild HFS R in V1-2-3 - eye closue is more prominent    "General Medical Examination:  General: Good hygiene, appropriate appearance.  HEENT: Normocephalic, atraumatic.   Neck: Supple.   Chest: Unlabored breathing.   CV: Symmetric pulses.   Ext: No clubbing, cyanosis, or edema.     Mental Status:  Mood/Affect: Appropriate/congruent.  Level of consciousness: Awake, alert.  Orientation: Oriented to person, place, time and situation.  Language: No Dysarthria    Cranial nerves:  I: Not " "tested  II: PERRL, VFF to counting  III, IV, VI: EOMI with conjugate gaze and no nystagmus on end gaze  V: Facial sensation intact and symmetric over the bilateral V1-V3  VII: Facial muscle activation Decreased On R  VIII: Hearing intact in the b/l ears and symmetrical to finger rub  IX, X, XII: TUP midline - no atrophy or fasiculations  X: SCMs and shoulder shrug full strength b/l and symmetric        Motor:   -UE: 5/5 deltoids; 5/5 biceps, triceps; 5/5 wrist flexors, extensors; 5/5 interosseous; 5/5   -LEs: 5/5 hip flexion, extension; 5/5 knee flexion, extension; 5/5 ankle flexion, extension      DTRs:  ? Biceps Triceps Brachioradialis Knee Ankle   Left 2+ 2+ 2+ 2+ 2+   Right 2+ 2+ 2+ 2+ 2+     ? Finger taps Finger flicks ALMA Heel taps   Left 0 0 0 0   Right 0 0 0 0   Neck tone: 0  ? Arm Leg   Left 0 0   Right 0 0     Sensation:   -Light touch: Intact and symmetric in the bilateral upper and lower extremities.  -Temp: Intact and symmetric in the bilateral upper and lower extremities.  -Vibration: Intact and symmetric in the bilateral upper and lower extremities.  -Pin prick: Intact and symmetric in the bilateral upper and lower extremities.  -Proprioception: Intact and symmetric in the bilateral upper and lower extremities.    Coordination:   -Finger to nose: 0  -Heel to shin: 0    Gait:  -Arises from chair without use of hands.  -Casual gait is: nl based  -Stride length: nl"    Laboratory Data:  NA    Imaging:  No central lesions or peripheral lesions to explain bell's palsy  Spoke with neuro-radiology who corroborated no peripheral or central lesions    Procedure:   Patient was prepped in usual fashion with alcohol pads.     100 units of onobotulinum toxin was prepared  61 units were wasted.   Muscles and doses that were injected:     Patients signed consent forms and we answered all questions about risks and benefits of botox injections. They agreed to the procedure.  ? L R   ?orbicularis oculi superior " lateral ? ?2   ?orbicularis oculi superior lateral ? ?2   orbicularis oculi  lateral  2   ?orbicularis oculi inferior lateral ? ?2   orbicularis oculi inferior lateral  2   Mentalis   5   rhizorus  5   Orbicularis oris  omitted   Above eybrow  omitted   masseter  +5U   ?nasalis ? ?5U   The patient tolerated the procedure well and there were no immediate complications following the procedure.         Assessment//Plan:   Problem List Items Addressed This Visit          Neuro    Hemifacial spasm    Overview     From Bell's palsy         Current Assessment & Plan     Did well with botox for hemifacial spasm                Follow-up: 6mos  Ilda Bailey MD, MS  [facility]  Department of Neurology  Movement Disorders      Procedures

## 2022-10-05 ENCOUNTER — PATIENT MESSAGE (OUTPATIENT)
Dept: DERMATOLOGY | Facility: CLINIC | Age: 52
End: 2022-10-05

## 2022-10-21 ENCOUNTER — PATIENT MESSAGE (OUTPATIENT)
Dept: DERMATOLOGY | Facility: CLINIC | Age: 52
End: 2022-10-21
Payer: COMMERCIAL

## 2022-10-21 ENCOUNTER — PATIENT MESSAGE (OUTPATIENT)
Dept: NEUROLOGY | Facility: CLINIC | Age: 52
End: 2022-10-21
Payer: COMMERCIAL

## 2022-11-02 ENCOUNTER — PATIENT MESSAGE (OUTPATIENT)
Dept: NEUROLOGY | Facility: CLINIC | Age: 52
End: 2022-11-02
Payer: COMMERCIAL

## 2022-11-03 ENCOUNTER — PATIENT MESSAGE (OUTPATIENT)
Dept: NEUROLOGY | Facility: CLINIC | Age: 52
End: 2022-11-03
Payer: COMMERCIAL

## 2022-11-03 DIAGNOSIS — G51.31 HEMIFACIAL SPASM OF RIGHT SIDE OF FACE: Primary | ICD-10-CM

## 2022-11-06 ENCOUNTER — PATIENT MESSAGE (OUTPATIENT)
Dept: ENDOCRINOLOGY | Facility: CLINIC | Age: 52
End: 2022-11-06
Payer: COMMERCIAL

## 2022-11-06 DIAGNOSIS — G43.829 MENSTRUAL MIGRAINE WITHOUT STATUS MIGRAINOSUS, NOT INTRACTABLE: ICD-10-CM

## 2022-11-06 NOTE — TELEPHONE ENCOUNTER
No new care gaps identified.  [facility] Embedded Care Gaps. Reference number: [id number]. 11/06/2022   12:21:05 PM CST

## 2022-11-07 RX ORDER — SUMATRIPTAN 50 MG/1
50 TABLET, FILM COATED ORAL EVERY 8 HOURS PRN
Qty: 60 TABLET | Refills: 1 | Status: SHIPPED | OUTPATIENT
Start: 2022-11-07 | End: 2022-12-23

## 2022-11-08 DIAGNOSIS — E03.9 HYPOTHYROIDISM, UNSPECIFIED TYPE: ICD-10-CM

## 2022-11-08 NOTE — TELEPHONE ENCOUNTER
No new care gaps identified.  [facility] Embedded Care Gaps. Reference number: [id number]. 11/08/2022   5:01:17 PM CST

## 2022-11-09 RX ORDER — LEVOTHYROXINE SODIUM 200 UG/1
200 TABLET ORAL
Qty: 30 TABLET | Refills: 2 | Status: SHIPPED | OUTPATIENT
Start: 2022-11-09 | End: 2022-12-23

## 2022-12-06 ENCOUNTER — PATIENT MESSAGE (OUTPATIENT)
Dept: ENDOCRINOLOGY | Facility: CLINIC | Age: 52
End: 2022-12-06
Payer: COMMERCIAL

## 2022-12-21 ENCOUNTER — OFFICE VISIT (OUTPATIENT)
Dept: DERMATOLOGY | Facility: CLINIC | Age: 52
End: 2022-12-21
Payer: COMMERCIAL

## 2022-12-21 ENCOUNTER — LAB VISIT (OUTPATIENT)
Dept: LAB | Facility: HOSPITAL | Age: 52
End: 2022-12-21
Attending: STUDENT IN AN ORGANIZED HEALTH CARE EDUCATION/TRAINING PROGRAM
Payer: COMMERCIAL

## 2022-12-21 ENCOUNTER — PATIENT MESSAGE (OUTPATIENT)
Dept: DERMATOLOGY | Facility: CLINIC | Age: 52
End: 2022-12-21

## 2022-12-21 DIAGNOSIS — L65.9 HAIR LOSS: ICD-10-CM

## 2022-12-21 DIAGNOSIS — L28.1 PRURIGO NODULARIS: Primary | ICD-10-CM

## 2022-12-21 LAB
ANION GAP SERPL CALC-SCNC: 13 MMOL/L (ref 8–16)
BUN SERPL-MCNC: 15 MG/DL (ref 6–20)
CALCIUM SERPL-MCNC: 9.3 MG/DL (ref 8.7–10.5)
CHLORIDE SERPL-SCNC: 105 MMOL/L (ref 95–110)
CO2 SERPL-SCNC: 20 MMOL/L (ref 23–29)
CREAT SERPL-MCNC: 1 MG/DL (ref 0.5–1.4)
EST. GFR  (NO RACE VARIABLE): >60 ML/MIN/1.73 M^2
GLUCOSE SERPL-MCNC: 67 MG/DL (ref 70–110)
POTASSIUM SERPL-SCNC: 4.4 MMOL/L (ref 3.5–5.1)
SODIUM SERPL-SCNC: 138 MMOL/L (ref 136–145)

## 2022-12-21 PROCEDURE — 1159F PR MEDICATION LIST DOCUMENTED IN MEDICAL RECORD: ICD-10-PCS | Mod: CPTII,S$GLB,, | Performed by: STUDENT IN AN ORGANIZED HEALTH CARE EDUCATION/TRAINING PROGRAM

## 2022-12-21 PROCEDURE — 99999 PR PBB SHADOW E&M-EST. PATIENT-LVL II: ICD-10-PCS | Mod: PBBFAC,,, | Performed by: STUDENT IN AN ORGANIZED HEALTH CARE EDUCATION/TRAINING PROGRAM

## 2022-12-21 PROCEDURE — 1160F PR REVIEW ALL MEDS BY PRESCRIBER/CLIN PHARMACIST DOCUMENTED: ICD-10-PCS | Mod: CPTII,S$GLB,, | Performed by: STUDENT IN AN ORGANIZED HEALTH CARE EDUCATION/TRAINING PROGRAM

## 2022-12-21 PROCEDURE — 1159F MED LIST DOCD IN RCRD: CPT | Mod: CPTII,S$GLB,, | Performed by: STUDENT IN AN ORGANIZED HEALTH CARE EDUCATION/TRAINING PROGRAM

## 2022-12-21 PROCEDURE — 80048 BASIC METABOLIC PNL TOTAL CA: CPT | Performed by: STUDENT IN AN ORGANIZED HEALTH CARE EDUCATION/TRAINING PROGRAM

## 2022-12-21 PROCEDURE — 99214 OFFICE O/P EST MOD 30 MIN: CPT | Mod: S$GLB,,, | Performed by: STUDENT IN AN ORGANIZED HEALTH CARE EDUCATION/TRAINING PROGRAM

## 2022-12-21 PROCEDURE — 99999 PR PBB SHADOW E&M-EST. PATIENT-LVL II: CPT | Mod: PBBFAC,,, | Performed by: STUDENT IN AN ORGANIZED HEALTH CARE EDUCATION/TRAINING PROGRAM

## 2022-12-21 PROCEDURE — 99214 PR OFFICE/OUTPT VISIT, EST, LEVL IV, 30-39 MIN: ICD-10-PCS | Mod: S$GLB,,, | Performed by: STUDENT IN AN ORGANIZED HEALTH CARE EDUCATION/TRAINING PROGRAM

## 2022-12-21 PROCEDURE — 1160F RVW MEDS BY RX/DR IN RCRD: CPT | Mod: CPTII,S$GLB,, | Performed by: STUDENT IN AN ORGANIZED HEALTH CARE EDUCATION/TRAINING PROGRAM

## 2022-12-21 PROCEDURE — 36415 COLL VENOUS BLD VENIPUNCTURE: CPT | Performed by: STUDENT IN AN ORGANIZED HEALTH CARE EDUCATION/TRAINING PROGRAM

## 2022-12-21 RX ORDER — CLOBETASOL PROPIONATE 0.46 MG/ML
SOLUTION TOPICAL DAILY
Qty: 50 ML | Refills: 2 | Status: SHIPPED | OUTPATIENT
Start: 2022-12-21

## 2022-12-21 RX ORDER — SPIRONOLACTONE 100 MG/1
100 TABLET, FILM COATED ORAL DAILY
Qty: 90 TABLET | Refills: 2 | Status: SHIPPED | OUTPATIENT
Start: 2022-12-21 | End: 2023-12-21

## 2022-12-21 RX ORDER — TRIAMCINOLONE ACETONIDE 1 MG/G
OINTMENT TOPICAL
Qty: 454 G | Refills: 1 | Status: SHIPPED | OUTPATIENT
Start: 2022-12-21 | End: 2023-02-21 | Stop reason: SDUPTHER

## 2022-12-23 ENCOUNTER — OFFICE VISIT (OUTPATIENT)
Dept: FAMILY MEDICINE | Facility: CLINIC | Age: 52
End: 2022-12-23
Payer: COMMERCIAL

## 2022-12-23 ENCOUNTER — LAB VISIT (OUTPATIENT)
Dept: LAB | Facility: HOSPITAL | Age: 52
End: 2022-12-23
Attending: INTERNAL MEDICINE
Payer: COMMERCIAL

## 2022-12-23 VITALS
TEMPERATURE: 98 F | SYSTOLIC BLOOD PRESSURE: 99 MMHG | DIASTOLIC BLOOD PRESSURE: 80 MMHG | HEART RATE: 70 BPM | OXYGEN SATURATION: 99 % | WEIGHT: 186.75 LBS | BODY MASS INDEX: 33.08 KG/M2 | RESPIRATION RATE: 18 BRPM

## 2022-12-23 DIAGNOSIS — E03.9 ACQUIRED HYPOTHYROIDISM: ICD-10-CM

## 2022-12-23 DIAGNOSIS — Z00.00 PREVENTATIVE HEALTH CARE: Primary | ICD-10-CM

## 2022-12-23 DIAGNOSIS — Z12.11 SCREENING FOR COLON CANCER: ICD-10-CM

## 2022-12-23 DIAGNOSIS — G43.809 OTHER MIGRAINE WITHOUT STATUS MIGRAINOSUS, NOT INTRACTABLE: ICD-10-CM

## 2022-12-23 DIAGNOSIS — Z12.31 ENCOUNTER FOR SCREENING MAMMOGRAM FOR MALIGNANT NEOPLASM OF BREAST: ICD-10-CM

## 2022-12-23 DIAGNOSIS — E03.9 HYPOTHYROIDISM, UNSPECIFIED TYPE: ICD-10-CM

## 2022-12-23 DIAGNOSIS — G43.829 MENSTRUAL MIGRAINE WITHOUT STATUS MIGRAINOSUS, NOT INTRACTABLE: ICD-10-CM

## 2022-12-23 LAB — TSH SERPL DL<=0.005 MIU/L-ACNC: 2.98 UIU/ML (ref 0.4–4)

## 2022-12-23 PROCEDURE — 3008F BODY MASS INDEX DOCD: CPT | Mod: CPTII,S$GLB,, | Performed by: INTERNAL MEDICINE

## 2022-12-23 PROCEDURE — 99396 PR PREVENTIVE VISIT,EST,40-64: ICD-10-PCS | Mod: S$GLB,,, | Performed by: INTERNAL MEDICINE

## 2022-12-23 PROCEDURE — 3074F PR MOST RECENT SYSTOLIC BLOOD PRESSURE < 130 MM HG: ICD-10-PCS | Mod: CPTII,S$GLB,, | Performed by: INTERNAL MEDICINE

## 2022-12-23 PROCEDURE — 3074F SYST BP LT 130 MM HG: CPT | Mod: CPTII,S$GLB,, | Performed by: INTERNAL MEDICINE

## 2022-12-23 PROCEDURE — 1159F PR MEDICATION LIST DOCUMENTED IN MEDICAL RECORD: ICD-10-PCS | Mod: CPTII,S$GLB,, | Performed by: INTERNAL MEDICINE

## 2022-12-23 PROCEDURE — 3008F PR BODY MASS INDEX (BMI) DOCUMENTED: ICD-10-PCS | Mod: CPTII,S$GLB,, | Performed by: INTERNAL MEDICINE

## 2022-12-23 PROCEDURE — 99396 PREV VISIT EST AGE 40-64: CPT | Mod: S$GLB,,, | Performed by: INTERNAL MEDICINE

## 2022-12-23 PROCEDURE — 99999 PR PBB SHADOW E&M-EST. PATIENT-LVL IV: CPT | Mod: PBBFAC,,, | Performed by: INTERNAL MEDICINE

## 2022-12-23 PROCEDURE — 99999 PR PBB SHADOW E&M-EST. PATIENT-LVL IV: ICD-10-PCS | Mod: PBBFAC,,, | Performed by: INTERNAL MEDICINE

## 2022-12-23 PROCEDURE — 1159F MED LIST DOCD IN RCRD: CPT | Mod: CPTII,S$GLB,, | Performed by: INTERNAL MEDICINE

## 2022-12-23 PROCEDURE — 3079F DIAST BP 80-89 MM HG: CPT | Mod: CPTII,S$GLB,, | Performed by: INTERNAL MEDICINE

## 2022-12-23 PROCEDURE — 3079F PR MOST RECENT DIASTOLIC BLOOD PRESSURE 80-89 MM HG: ICD-10-PCS | Mod: CPTII,S$GLB,, | Performed by: INTERNAL MEDICINE

## 2022-12-23 PROCEDURE — 1160F RVW MEDS BY RX/DR IN RCRD: CPT | Mod: CPTII,S$GLB,, | Performed by: INTERNAL MEDICINE

## 2022-12-23 PROCEDURE — 36415 COLL VENOUS BLD VENIPUNCTURE: CPT | Mod: PN | Performed by: INTERNAL MEDICINE

## 2022-12-23 PROCEDURE — 84443 ASSAY THYROID STIM HORMONE: CPT | Performed by: INTERNAL MEDICINE

## 2022-12-23 PROCEDURE — 1160F PR REVIEW ALL MEDS BY PRESCRIBER/CLIN PHARMACIST DOCUMENTED: ICD-10-PCS | Mod: CPTII,S$GLB,, | Performed by: INTERNAL MEDICINE

## 2022-12-23 RX ORDER — SUMATRIPTAN 50 MG/1
50 TABLET, FILM COATED ORAL EVERY 8 HOURS PRN
Qty: 28 TABLET | Refills: 3 | Status: SHIPPED | OUTPATIENT
Start: 2022-12-23 | End: 2022-12-26

## 2022-12-23 RX ORDER — LEVOTHYROXINE SODIUM 200 UG/1
200 TABLET ORAL
Qty: 90 TABLET | Refills: 3 | Status: SHIPPED | OUTPATIENT
Start: 2022-12-23 | End: 2024-01-02

## 2022-12-23 NOTE — PROGRESS NOTES
"Subjective:       Patient ID: Magalis Rosales is a 52 y.o. female.    Chief Complaint: Annual Exam    F/u chronic conditions    HPI: 51 y/o w/ hypothyroid migraine headaches for scheduled follow up. Her thyroid levels havev fluctuated in past based on  of her levothyroxine. She has achieved biochemical euthyroid with consistent use of name brand synthroid she denies dysphagia/odonyphagia. She does have monthly migraine headaches for which she uses imitrex with good effect. She does note some chronic constipation for which she will take fiber supplemetns she has intermittent left upper abdomen "pulling" which is exacerbated by pushing on this area no associated pain with po intake no vomitting    Review of Systems   Constitutional:  Negative for activity change, appetite change, fatigue, fever and unexpected weight change.   HENT:  Negative for ear pain, rhinorrhea and sore throat.    Eyes:  Negative for discharge and visual disturbance.   Respiratory:  Negative for chest tightness, shortness of breath and wheezing.    Cardiovascular:  Negative for chest pain, palpitations and leg swelling.   Gastrointestinal:  Negative for abdominal pain, constipation and diarrhea.   Endocrine: Negative for cold intolerance and heat intolerance.   Genitourinary:  Negative for dysuria and hematuria.   Musculoskeletal:  Negative for joint swelling and neck stiffness.   Skin:  Negative for rash.   Neurological:  Negative for dizziness, syncope, weakness and headaches.   Psychiatric/Behavioral:  Negative for suicidal ideas.      Objective:     Vitals:    12/23/22 1104   BP: 99/80   BP Location: Right arm   Patient Position: Sitting   BP Method: Medium (Manual)   Pulse: 70   Resp: 18   Temp: 98.2 °F (36.8 °C)   TempSrc: Oral   SpO2: 99%   Weight: 84.7 kg (186 lb 11.7 oz)          Physical Exam  Constitutional:       Appearance: She is well-developed.   HENT:      Head: Normocephalic and atraumatic.   Eyes:      General: No " scleral icterus.     Conjunctiva/sclera: Conjunctivae normal.   Cardiovascular:      Rate and Rhythm: Normal rate and regular rhythm.      Heart sounds: No murmur heard.    No friction rub. No gallop.   Pulmonary:      Effort: Pulmonary effort is normal.      Breath sounds: Normal breath sounds. No wheezing or rales.   Abdominal:      General: There is no distension.      Palpations: Abdomen is soft.      Tenderness: There is no abdominal tenderness. There is no guarding or rebound.   Musculoskeletal:         General: No tenderness. Normal range of motion.      Cervical back: Normal range of motion.      Right lower leg: No edema.      Left lower leg: No edema.   Skin:     General: Skin is warm and dry.   Neurological:      Mental Status: She is alert and oriented to person, place, and time.      Cranial Nerves: No cranial nerve deficit.       Assessment and Plan   1. Preventative health care  Wear seatbelts at all times    Don't drink and drive    Wear bike helmet and other personal protective equipment when appropriate      - HEPATIC FUNCTION PANEL; Future    2. Acquired hypothyroidism  She requires name brand synthroid to maintaine euthyroid as has had elevated TSH with consistent use of generic manufactureres  - TSH; Future  - HEPATIC FUNCTION PANEL; Future    3. Other migraine without status migrainosus, not intractable  Prn imitrex    4. Encounter for screening mammogram for malignant neoplasm of breast  mammogram  - Mammo Digital Screening Bilat w/ Abraham; Future    5. Screening for colon cancer  Fit kit  - Fecal Immunochemical Test (iFOBT); Future    6. Hypothyroidism, unspecified type  As above  - SYNTHROID 200 mcg tablet; Take 1 tablet (200 mcg total) by mouth before breakfast.  Dispense: 90 tablet; Refill: 3    7. Menstrual migraine without status migrainosus, not intractable  As above  - sumatriptan (IMITREX) 50 MG tablet; Take 1 tablet (50 mg total) by mouth every 8 (eight) hours as needed for Migraine.   Dispense: 28 tablet; Refill: 3

## 2022-12-26 ENCOUNTER — PATIENT MESSAGE (OUTPATIENT)
Dept: FAMILY MEDICINE | Facility: CLINIC | Age: 52
End: 2022-12-26
Payer: COMMERCIAL

## 2022-12-26 DIAGNOSIS — G43.829 MENSTRUAL MIGRAINE WITHOUT STATUS MIGRAINOSUS, NOT INTRACTABLE: Primary | ICD-10-CM

## 2022-12-26 DIAGNOSIS — G43.829 MENSTRUAL MIGRAINE WITHOUT STATUS MIGRAINOSUS, NOT INTRACTABLE: ICD-10-CM

## 2022-12-26 RX ORDER — SUMATRIPTAN 50 MG/1
50 TABLET, FILM COATED ORAL EVERY 8 HOURS PRN
Qty: 27 TABLET | Refills: 3 | Status: SHIPPED | OUTPATIENT
Start: 2022-12-26 | End: 2023-02-02 | Stop reason: SDUPTHER

## 2022-12-26 NOTE — TELEPHONE ENCOUNTER
No new care gaps identified.  [facility] Embedded Care Gaps. Reference number: [id number]. 12/26/2022   4:20:52 PM CST

## 2022-12-27 ENCOUNTER — HOSPITAL ENCOUNTER (EMERGENCY)
Facility: HOSPITAL | Age: 52
Discharge: HOME | End: 2022-12-27
Attending: EMERGENCY MEDICINE
Payer: COMMERCIAL

## 2022-12-27 VITALS
RESPIRATION RATE: 20 BRPM | OXYGEN SATURATION: 100 % | TEMPERATURE: 99 F | HEART RATE: 60 BPM | DIASTOLIC BLOOD PRESSURE: 80 MMHG | HEIGHT: 63 IN | BODY MASS INDEX: 31.64 KG/M2 | SYSTOLIC BLOOD PRESSURE: 132 MMHG | WEIGHT: 178.56 LBS

## 2022-12-27 DIAGNOSIS — R51.9 ACUTE NONINTRACTABLE HEADACHE, UNSPECIFIED HEADACHE TYPE: Primary | ICD-10-CM

## 2022-12-27 LAB
ALBUMIN SERPL BCP-MCNC: 3.5 G/DL (ref 3.5–5.2)
ALP SERPL-CCNC: 91 U/L (ref 55–135)
ALT SERPL W/O P-5'-P-CCNC: 19 U/L (ref 10–44)
ANION GAP SERPL CALC-SCNC: 12 MMOL/L (ref 8–16)
AST SERPL-CCNC: 30 U/L (ref 10–40)
BASOPHILS # BLD AUTO: 0.05 K/UL (ref 0–0.2)
BASOPHILS NFR BLD: 0.5 % (ref 0–1.9)
BILIRUB SERPL-MCNC: 0.2 MG/DL (ref 0.1–1)
BILIRUB UR QL STRIP: NEGATIVE
BUN SERPL-MCNC: 15 MG/DL (ref 6–20)
CALCIUM SERPL-MCNC: 9.1 MG/DL (ref 8.7–10.5)
CHLORIDE SERPL-SCNC: 103 MMOL/L (ref 95–110)
CLARITY UR: CLEAR
CO2 SERPL-SCNC: 23 MMOL/L (ref 23–29)
COLOR UR: COLORLESS
CREAT SERPL-MCNC: 1 MG/DL (ref 0.5–1.4)
DIFFERENTIAL METHOD: NORMAL
EOSINOPHIL # BLD AUTO: 0.1 K/UL (ref 0–0.5)
EOSINOPHIL NFR BLD: 0.9 % (ref 0–8)
ERYTHROCYTE [DISTWIDTH] IN BLOOD BY AUTOMATED COUNT: 14.3 % (ref 11.5–14.5)
EST. GFR  (NO RACE VARIABLE): >60 ML/MIN/1.73 M^2
GLUCOSE SERPL-MCNC: 98 MG/DL (ref 70–110)
GLUCOSE UR QL STRIP: NEGATIVE
HCT VFR BLD AUTO: 41.9 % (ref 37–48.5)
HGB BLD-MCNC: 13.8 G/DL (ref 12–16)
HGB UR QL STRIP: NEGATIVE
IMM GRANULOCYTES # BLD AUTO: 0.03 K/UL (ref 0–0.04)
IMM GRANULOCYTES NFR BLD AUTO: 0.3 % (ref 0–0.5)
KETONES UR QL STRIP: NEGATIVE
LEUKOCYTE ESTERASE UR QL STRIP: NEGATIVE
LYMPHOCYTES # BLD AUTO: 2.9 K/UL (ref 1–4.8)
LYMPHOCYTES NFR BLD: 31.5 % (ref 18–48)
MCH RBC QN AUTO: 28.3 PG (ref 27–31)
MCHC RBC AUTO-ENTMCNC: 32.9 G/DL (ref 32–36)
MCV RBC AUTO: 86 FL (ref 82–98)
MONOCYTES # BLD AUTO: 0.5 K/UL (ref 0.3–1)
MONOCYTES NFR BLD: 5.7 % (ref 4–15)
NEUTROPHILS # BLD AUTO: 5.6 K/UL (ref 1.8–7.7)
NEUTROPHILS NFR BLD: 61.1 % (ref 38–73)
NITRITE UR QL STRIP: NEGATIVE
NRBC BLD-RTO: 0 /100 WBC
PH UR STRIP: 6 [PH] (ref 5–8)
PLATELET # BLD AUTO: 211 K/UL (ref 150–450)
PMV BLD AUTO: 12.7 FL (ref 9.2–12.9)
POCT GLUCOSE: 97 MG/DL (ref 70–110)
POTASSIUM SERPL-SCNC: 5 MMOL/L (ref 3.5–5.1)
PROT SERPL-MCNC: 7.9 G/DL (ref 6–8.4)
PROT UR QL STRIP: NEGATIVE
RBC # BLD AUTO: 4.88 M/UL (ref 4–5.4)
SODIUM SERPL-SCNC: 138 MMOL/L (ref 136–145)
SP GR UR STRIP: 1.01 (ref 1–1.03)
URN SPEC COLLECT METH UR: ABNORMAL
UROBILINOGEN UR STRIP-ACNC: NEGATIVE EU/DL
WBC # BLD AUTO: 9.15 K/UL (ref 3.9–12.7)

## 2022-12-27 PROCEDURE — 96361 HYDRATE IV INFUSION ADD-ON: CPT

## 2022-12-27 PROCEDURE — 25000003 PHARM REV CODE 250: Performed by: PHYSICIAN ASSISTANT

## 2022-12-27 PROCEDURE — 82962 GLUCOSE BLOOD TEST: CPT

## 2022-12-27 PROCEDURE — 85025 COMPLETE CBC W/AUTO DIFF WBC: CPT | Performed by: PHYSICIAN ASSISTANT

## 2022-12-27 PROCEDURE — 81003 URINALYSIS AUTO W/O SCOPE: CPT | Performed by: PHYSICIAN ASSISTANT

## 2022-12-27 PROCEDURE — 96375 TX/PRO/DX INJ NEW DRUG ADDON: CPT

## 2022-12-27 PROCEDURE — 96374 THER/PROPH/DIAG INJ IV PUSH: CPT

## 2022-12-27 PROCEDURE — 99284 EMERGENCY DEPT VISIT MOD MDM: CPT | Mod: 25

## 2022-12-27 PROCEDURE — 80053 COMPREHEN METABOLIC PANEL: CPT | Performed by: PHYSICIAN ASSISTANT

## 2022-12-27 PROCEDURE — 63600175 PHARM REV CODE 636 W HCPCS: Performed by: PHYSICIAN ASSISTANT

## 2022-12-27 RX ORDER — KETOROLAC TROMETHAMINE 30 MG/ML
15 INJECTION, SOLUTION INTRAMUSCULAR; INTRAVENOUS
Status: DISCONTINUED | OUTPATIENT
Start: 2022-12-27 | End: 2022-12-27

## 2022-12-27 RX ORDER — DIPHENHYDRAMINE HYDROCHLORIDE 50 MG/ML
50 INJECTION INTRAMUSCULAR; INTRAVENOUS
Status: COMPLETED | OUTPATIENT
Start: 2022-12-27 | End: 2022-12-27

## 2022-12-27 RX ORDER — PROCHLORPERAZINE EDISYLATE 5 MG/ML
5 INJECTION INTRAMUSCULAR; INTRAVENOUS
Status: COMPLETED | OUTPATIENT
Start: 2022-12-27 | End: 2022-12-27

## 2022-12-27 RX ORDER — BUTALBITAL, ACETAMINOPHEN AND CAFFEINE 50; 325; 40 MG/1; MG/1; MG/1
1 TABLET ORAL
Status: DISCONTINUED | OUTPATIENT
Start: 2022-12-27 | End: 2022-12-27

## 2022-12-27 RX ADMIN — DIPHENHYDRAMINE HYDROCHLORIDE 50 MG: 50 INJECTION, SOLUTION INTRAMUSCULAR; INTRAVENOUS at 02:12

## 2022-12-27 RX ADMIN — SODIUM CHLORIDE 1000 ML: 0.9 INJECTION, SOLUTION INTRAVENOUS at 02:12

## 2022-12-27 RX ADMIN — PROCHLORPERAZINE EDISYLATE 5 MG: 5 INJECTION INTRAMUSCULAR; INTRAVENOUS at 02:12

## 2022-12-27 NOTE — DISCHARGE INSTRUCTIONS

## 2022-12-27 NOTE — TELEPHONE ENCOUNTER
Refill Decision Note   Magalis Rosales  is requesting a refill authorization.  Brief Assessment and Rationale for Refill:  Approve     Medication Therapy Plan:  Rx sent to home pharmacy    Medication Reconciliation Completed: No   Comments:     No Care Gaps recommended.     Note composed:8:11 PM 12/26/2022

## 2022-12-27 NOTE — ED TRIAGE NOTES
51 yo female presents to ED with c/o H/A that started last week. Pt reports that she has a PMH of migraine headaches. Pt states that she took her medication (sumatriptan) but the H/A remains; states that the medication only alleviated the pain momentarily.  Pt rates pain at a 10 on a PRS of 0-10.

## 2022-12-27 NOTE — ED PROVIDER NOTES
Encounter Date: 2022       History     Chief Complaint   Patient presents with    Headache     Pt comes  to the er via pov with c/o headache that started last Friday. Pt has medication that she takes for migraines and has taken those medications with no relief.      CC: Headache    HPI:   51 y/o F w hx of migraine HAs presenting for 4 day history of intermittent HA with associated photophobia.  She reports it changes locations and has been in L parietal R parietal and coronal regions. Has been occurring for the past 4 days. Attempted tx w Imitrex. For the pat couple of mos HAs occurring 2-3 x/week and occur 2-3x/day for a couple of hours and then return. Prior to this only 2-3x/month.   Denies fever, URI symptoms, confusion, gait or speech difficulty, chest pain, shortness breath, dizziness, lightheadedness, other associated symptoms  She does see Neurology but for facial spasms.     The history is provided by the patient.   Review of patient's allergies indicates:  No Known Allergies  Past Medical History:   Diagnosis Date    History of psychiatric care     History of psychiatric hospitalization     Hypothyroidism     Migraines     Psychiatric exam requested by [facility]     Psychiatric problem     Therapy      Past Surgical History:   Procedure Laterality Date    APPENDECTOMY       SECTION      HYSTERECTOMY      HYSTEROSCOPY WITH DILATION AND CURETTAGE OF UTERUS N/A 2020    Procedure: HYSTEROSCOPY, WITH DILATION AND CURETTAGE OF UTERUS;  Surgeon: Nesha Skaggs Mai, MD;  Location: [facility] OR;  Service: OB/GYN;  Laterality: N/A;  RN PRE OP 6- COVID & T&S 2020. [place]  H/P incomplete----UPT    LAPAROSCOPIC SALPINGECTOMY Bilateral 2021    Procedure: SALPINGECTOMY, LAPAROSCOPIC;  Surgeon: Nesha Skaggs Mai, MD;  Location: [facility] OR;  Service: OB/GYN;  Laterality: Bilateral;    LAPAROSCOPIC TOTAL HYSTERECTOMY N/A 2021    Procedure: HYSTERECTOMY, TOTAL, LAPAROSCOPIC;  Surgeon: Nesha Skaggs  MD Aminata;  Location: [facility] OR;  Service: OB/GYN;  Laterality: N/A;  RN PREOP 1/25/21,-- COVID NEGATIVE ON 1/25-- T/S, and HCG quant done  with bilateral salpingectomy and cystoscopy  NEED CONSENTS    LASER ABLATION OF THE CERVIX       Family History   Problem Relation Age of Onset    Hypertension Mother     Hypertension Father     Stroke Father     Ovarian cancer Paternal Grandmother      Social History     Tobacco Use    Smoking status: Never    Smokeless tobacco: Never   Substance Use Topics    Alcohol use: No    Drug use: No     Review of Systems   Constitutional:  Negative for chills and fever.   HENT:  Negative for congestion, ear pain, nosebleeds, rhinorrhea, sore throat and trouble swallowing.    Eyes:  Positive for photophobia. Negative for redness.   Respiratory:  Negative for cough, shortness of breath and stridor.    Cardiovascular:  Negative for chest pain.   Gastrointestinal:  Positive for constipation and nausea. Negative for abdominal pain, blood in stool, diarrhea and vomiting.   Genitourinary:  Negative for decreased urine volume, dysuria, frequency, hematuria and urgency.   Musculoskeletal:  Negative for back pain and neck pain.   Skin:  Negative for rash and wound.   Neurological:  Negative for dizziness, speech difficulty, weakness, light-headedness, numbness and headaches.   Hematological:  Does not bruise/bleed easily.   Psychiatric/Behavioral:  Negative for confusion.      Physical Exam     Initial Vitals [12/27/22 0143]   BP Pulse Resp Temp SpO2   138/82 73 17 98.1 °F (36.7 °C) 100 %      MAP       --         Physical Exam    Nursing note and vitals reviewed.  Constitutional: She appears well-developed and well-nourished.   HENT:   Head: Normocephalic.   Right Ear: External ear normal.   Left Ear: External ear normal.   Nose: Nose normal.   Mouth/Throat: Oropharynx is clear and moist.   Eyes: Conjunctivae are normal.   Cardiovascular:  Normal rate and regular rhythm.     Exam reveals no gallop  and no friction rub.       No murmur heard.  Pulmonary/Chest: Breath sounds normal. She has no wheezes. She has no rhonchi. She has no rales.   Abdominal: Abdomen is soft. Bowel sounds are normal. She exhibits no distension. There is no abdominal tenderness. There is no rebound, no guarding, no tenderness at McBurney's point and negative Wright's sign.   Musculoskeletal:         General: Normal range of motion.     Lymphadenopathy:     She has no cervical adenopathy.   Neurological: She is alert. She has normal strength. No cranial nerve deficit or sensory deficit. Coordination and gait normal.   Skin: Skin is warm and dry.   Psychiatric: She has a normal mood and affect.       ED Course   Procedures  Labs Reviewed   URINALYSIS, REFLEX TO URINE CULTURE - Abnormal; Notable for the following components:       Result Value    Color, UA Colorless (*)     All other components within normal limits    Narrative:     Specimen Source->Urine   CBC W/ AUTO DIFFERENTIAL   COMPREHENSIVE METABOLIC PANEL   POCT GLUCOSE          Imaging Results    None          Medications   sodium chloride 0.9% bolus 1,000 mL 1,000 mL (0 mLs Intravenous Stopped 12/27/22 0337)   diphenhydrAMINE injection 50 mg (50 mg Intravenous Given 12/27/22 0214)   prochlorperazine injection Soln 5 mg (5 mg Intravenous Given 12/27/22 0217)     Medical Decision Making:   Clinical Tests:   Lab Tests: Ordered and Reviewed  ED Management:  52-year-old female with history of migraine headaches presenting for worsening headaches.  She is followed by neurology but has not seen them for her migraines.  Patient is afebrile nontoxic appearing no distress.  Exam above.  No focal neurologic deficits.  No meningeal signs.  No sinus tenderness.  She has had previous CT MRI and MRA of the brain with no abnormality.  Patient longer having headache after IV fluids Benadryl and Compazine.  Will have patient follow up with Neurology.  Will have patient return to the ER for  worsening or as needed.                        Clinical Impression:   Final diagnoses:  [R51.9] Acute nonintractable headache, unspecified headache type (Primary)        ED Disposition Condition    Discharge Stable          ED Prescriptions    None       Follow-up Information       Follow up With Specialties Details Why Contact Info    Brock Womack MD Internal Medicine, Wound Care Schedule an appointment as soon as possible for a visit in 2 days for follow up [address] [facility] [address]  [phone]      [facility] - Emergency Dept Emergency Medicine Go to  As needed, If symptoms worsen [address] [facility] [id number] [phone]             Terrie Cobos PA-C  12/27/22 0952

## 2022-12-27 NOTE — Clinical Note
"Magalis"Kassandra Rosales was seen and treated in our emergency department on 12/27/2022.  She may return to work on 12/30/2022.       If you have any questions or concerns, please don't hesitate to call.      Terrie Cobos PA-C"

## 2023-01-20 ENCOUNTER — LAB VISIT (OUTPATIENT)
Dept: LAB | Facility: HOSPITAL | Age: 53
End: 2023-01-20
Attending: INTERNAL MEDICINE
Payer: COMMERCIAL

## 2023-01-20 ENCOUNTER — HOSPITAL ENCOUNTER (OUTPATIENT)
Dept: RADIOLOGY | Facility: HOSPITAL | Age: 53
Discharge: HOME | End: 2023-01-20
Attending: INTERNAL MEDICINE
Payer: COMMERCIAL

## 2023-01-20 DIAGNOSIS — Z12.31 ENCOUNTER FOR SCREENING MAMMOGRAM FOR MALIGNANT NEOPLASM OF BREAST: ICD-10-CM

## 2023-01-20 DIAGNOSIS — Z12.11 SCREENING FOR COLON CANCER: ICD-10-CM

## 2023-01-20 PROCEDURE — 77067 SCR MAMMO BI INCL CAD: CPT | Mod: TC

## 2023-01-20 PROCEDURE — 77067 SCR MAMMO BI INCL CAD: CPT | Mod: 26,,, | Performed by: RADIOLOGY

## 2023-01-20 PROCEDURE — 77063 MAMMO DIGITAL SCREENING BILAT WITH TOMO: ICD-10-PCS | Mod: 26,,, | Performed by: RADIOLOGY

## 2023-01-20 PROCEDURE — 77067 MAMMO DIGITAL SCREENING BILAT WITH TOMO: ICD-10-PCS | Mod: 26,,, | Performed by: RADIOLOGY

## 2023-01-20 PROCEDURE — 77063 BREAST TOMOSYNTHESIS BI: CPT | Mod: 26,,, | Performed by: RADIOLOGY

## 2023-01-20 PROCEDURE — 82274 ASSAY TEST FOR BLOOD FECAL: CPT | Performed by: INTERNAL MEDICINE

## 2023-01-26 LAB — HEMOCCULT STL QL IA: NEGATIVE

## 2023-01-31 ENCOUNTER — OFFICE VISIT (OUTPATIENT)
Dept: NEUROLOGY | Facility: CLINIC | Age: 53
End: 2023-01-31
Payer: COMMERCIAL

## 2023-01-31 VITALS
HEART RATE: 68 BPM | BODY MASS INDEX: 32.35 KG/M2 | DIASTOLIC BLOOD PRESSURE: 87 MMHG | HEIGHT: 63 IN | SYSTOLIC BLOOD PRESSURE: 140 MMHG | WEIGHT: 182.56 LBS

## 2023-01-31 DIAGNOSIS — G43.019 MIGRAINE WITHOUT AURA, INTRACTABLE, WITHOUT STATUS MIGRAINOSUS: Primary | ICD-10-CM

## 2023-01-31 DIAGNOSIS — G43.829 MENSTRUAL MIGRAINE WITHOUT STATUS MIGRAINOSUS, NOT INTRACTABLE: ICD-10-CM

## 2023-01-31 PROCEDURE — 1159F PR MEDICATION LIST DOCUMENTED IN MEDICAL RECORD: ICD-10-PCS | Mod: CPTII,S$GLB,, | Performed by: STUDENT IN AN ORGANIZED HEALTH CARE EDUCATION/TRAINING PROGRAM

## 2023-01-31 PROCEDURE — 99999 PR PBB SHADOW E&M-EST. PATIENT-LVL III: ICD-10-PCS | Mod: PBBFAC,,, | Performed by: STUDENT IN AN ORGANIZED HEALTH CARE EDUCATION/TRAINING PROGRAM

## 2023-01-31 PROCEDURE — 3079F PR MOST RECENT DIASTOLIC BLOOD PRESSURE 80-89 MM HG: ICD-10-PCS | Mod: CPTII,S$GLB,, | Performed by: STUDENT IN AN ORGANIZED HEALTH CARE EDUCATION/TRAINING PROGRAM

## 2023-01-31 PROCEDURE — 3008F BODY MASS INDEX DOCD: CPT | Mod: CPTII,S$GLB,, | Performed by: STUDENT IN AN ORGANIZED HEALTH CARE EDUCATION/TRAINING PROGRAM

## 2023-01-31 PROCEDURE — 99214 PR OFFICE/OUTPT VISIT, EST, LEVL IV, 30-39 MIN: ICD-10-PCS | Mod: S$GLB,,, | Performed by: STUDENT IN AN ORGANIZED HEALTH CARE EDUCATION/TRAINING PROGRAM

## 2023-01-31 PROCEDURE — 3077F SYST BP >= 140 MM HG: CPT | Mod: CPTII,S$GLB,, | Performed by: STUDENT IN AN ORGANIZED HEALTH CARE EDUCATION/TRAINING PROGRAM

## 2023-01-31 PROCEDURE — 3008F PR BODY MASS INDEX (BMI) DOCUMENTED: ICD-10-PCS | Mod: CPTII,S$GLB,, | Performed by: STUDENT IN AN ORGANIZED HEALTH CARE EDUCATION/TRAINING PROGRAM

## 2023-01-31 PROCEDURE — 99214 OFFICE O/P EST MOD 30 MIN: CPT | Mod: S$GLB,,, | Performed by: STUDENT IN AN ORGANIZED HEALTH CARE EDUCATION/TRAINING PROGRAM

## 2023-01-31 PROCEDURE — 99999 PR PBB SHADOW E&M-EST. PATIENT-LVL III: CPT | Mod: PBBFAC,,, | Performed by: STUDENT IN AN ORGANIZED HEALTH CARE EDUCATION/TRAINING PROGRAM

## 2023-01-31 PROCEDURE — 3079F DIAST BP 80-89 MM HG: CPT | Mod: CPTII,S$GLB,, | Performed by: STUDENT IN AN ORGANIZED HEALTH CARE EDUCATION/TRAINING PROGRAM

## 2023-01-31 PROCEDURE — 1159F MED LIST DOCD IN RCRD: CPT | Mod: CPTII,S$GLB,, | Performed by: STUDENT IN AN ORGANIZED HEALTH CARE EDUCATION/TRAINING PROGRAM

## 2023-01-31 PROCEDURE — 3077F PR MOST RECENT SYSTOLIC BLOOD PRESSURE >= 140 MM HG: ICD-10-PCS | Mod: CPTII,S$GLB,, | Performed by: STUDENT IN AN ORGANIZED HEALTH CARE EDUCATION/TRAINING PROGRAM

## 2023-01-31 RX ORDER — UBROGEPANT 100 MG/1
100 TABLET ORAL ONCE AS NEEDED
Qty: 14 TABLET | Refills: 5 | Status: SHIPPED | OUTPATIENT
Start: 2023-01-31 | End: 2023-01-31

## 2023-01-31 RX ORDER — LANOLIN ALCOHOL/MO/W.PET/CERES
400 CREAM (GRAM) TOPICAL DAILY
Qty: 30 TABLET | Refills: 5 | Status: SHIPPED | OUTPATIENT
Start: 2023-01-31 | End: 2023-05-29 | Stop reason: SDUPTHER

## 2023-01-31 NOTE — PROGRESS NOTES
Chief Complaint and Duration     Chief Complaint   Patient presents with    Migraine   Since 2007, worsened recently    History of Present Illness     Magalis Rosales is a 52 y.o. R handed female with a history of multiple medical diagnoses as listed below that presents for evaluation and treatment of headache. She does have a headache at this time.    Started in 2007, gradually worse, no inciting event. Was related to menstrual cycle prior, has had partial hysterectomy. Now, happening every day.     Hx of strokes in family (aunt had aneurysm, dad has had strokes)    Medications tried - topiramate (failed), tramadol (stopped)  Abortives - imitrex currently    Sleeps well at night.     Description of Headaches:  Location of pain: frontal or temporal or apical  Radiation of pain?: radiates occipitally  Character of pain: stabbing  Severity of pain: can get up to 10/10  Accompanying symptoms: photosensitivity, phonophobia, nausea  Prodromal sx?: none  Rapidity of onset: gradual  Typical duration of individual headache: if take medication, works within 30 minutes. Or can last all day  Are most headaches similar in presentation? yes  Typical precipitants: no significant precipitants    Temporal Pattern of Headaches:  Started having HA's since 2007  Worst time of day: afternoon evening  Awaken from sleep?: yes - the tension builds  Seasonal pattern?: no  Clustering of HA's over time? 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 no  History of head/neck surgery? no  Family h/o headache problems? no  Use of meds that might worsen HA's (nitrates, exogenous estrogens,    Nifedipine)? no  Exposure to carbon monoxide? no  Substance use: no    Review of Systems: (positive bolded)  Constitutional: Negative for fatigue, activity change, fevers, or chills.   HENT:   Negative for new visual disturbances or difficulty swallowing.    Respiratory:  Negative for shortness of breath.    Cardiovascular:  Negative for palpitations.   Gastrointestinal:  Negative for constipation, nausea, or vomiting.   Endocrine: Negative for temperature instability/intolerance.   Genitourinary:  Negative for difficulty urinating.   Musculoskeletal:  Negative for neck pain, back pain, myalgias, joint swelling, or gait problem.  Skin:  Negative for rash or lesions.   Neurological:  Negative for seizures, headaches, dizziness, tremors, syncope, speech difficulty, weakness, light-headedness, or numbness.   Hematological:  Does not bruise/bleed easily.   Psychiatric/Behavioral: Negative for decreased concentration or sleep disturbance.    Review of patient's allergies indicates:  No Known Allergies  Current Outpatient Medications   Medication Sig Dispense Refill    atogepant 60 mg Tab Take 60 mg by mouth once daily. 30 tablet 5    azelastine (ASTELIN) 137 mcg (0.1 %) nasal spray 1 spray (137 mcg total) by Nasal route 2 (two) times daily. for 10 days 30 mL 0    chlorhexidine (PERIDEX) 0.12 % solution       clobetasoL (TEMOVATE) 0.05 % external solution Apply topically once daily. Apply to the scalp 50 mL 3    clobetasoL (TEMOVATE) 0.05 % external solution Apply topically once daily. 50 mL 3    clobetasoL (TEMOVATE) 0.05 % external solution Apply topically once daily. 50 mL 3    clobetasoL (TEMOVATE) 0.05 % external solution Apply topically once daily. 50 mL 2    magnesium oxide (MAG-OX) 400 mg (241.3 mg magnesium) tablet Take 1 tablet (400 mg total) by mouth once daily. 30 tablet 5    spironolactone (ALDACTONE) 100 MG tablet Take 1 tablet (100 mg total) by mouth once daily. 90 tablet 2    spironolactone (ALDACTONE) 50 MG tablet Take 1 tablet (50 mg total) by mouth 2 (two) times daily. 120 tablet 1    sumatriptan (IMITREX) 50 MG tablet TAKE 1 TABLET (50 MG TOTAL) BY MOUTH EVERY 8 (EIGHT) HOURS AS NEEDED FOR  MIGRAINE. 27 tablet 3    SYNTHROID 200 mcg tablet Take 1 tablet (200 mcg total) by mouth before breakfast. 90 tablet 3    triamcinolone acetonide 0.1% (KENALOG) 0.1 % ointment AAA bid 454 g 1    ubrogepant (UBRELVY) 100 mg tablet Take 1 tablet (100 mg total) by mouth once as needed for Migraine. Okay to take 2nd tablet in 2 hours if mild relief, no more than 2x/week 14 tablet 5     No current facility-administered medications for this visit.       Medical History     Past Medical History:   Diagnosis Date    History of psychiatric care     History of psychiatric hospitalization     Hypothyroidism     Migraines     Psychiatric exam requested by [facility]     Psychiatric problem     Therapy      Past Surgical History:   Procedure Laterality Date    APPENDECTOMY       SECTION      HYSTERECTOMY      HYSTEROSCOPY WITH DILATION AND CURETTAGE OF UTERUS N/A 2020    Procedure: HYSTEROSCOPY, WITH DILATION AND CURETTAGE OF UTERUS;  Surgeon: Nesha Skaggs Mai, MD;  Location: [facility] OR;  Service: OB/GYN;  Laterality: N/A;  RN PRE OP 6- COVID & T&S 2020. [place]  H/P incomplete----UPT    LAPAROSCOPIC SALPINGECTOMY Bilateral 2021    Procedure: SALPINGECTOMY, LAPAROSCOPIC;  Surgeon: Nesha Skaggs Mai, MD;  Location: [facility] OR;  Service: OB/GYN;  Laterality: Bilateral;    LAPAROSCOPIC TOTAL HYSTERECTOMY N/A 2021    Procedure: HYSTERECTOMY, TOTAL, LAPAROSCOPIC;  Surgeon: Nesha Skaggs Mai, MD;  Location: [facility] OR;  Service: OB/GYN;  Laterality: N/A;  RN PREOP 21,-- COVID NEGATIVE ON -- T/S, and HCG quant done  with bilateral salpingectomy and cystoscopy  NEED CONSENTS    LASER ABLATION OF THE CERVIX       Family History   Problem Relation Age of Onset    Hypertension Mother     Hypertension Father     Stroke Father     Ovarian cancer Paternal Grandmother      Social History     Socioeconomic History    Marital status: Single    Number of children: 1   Occupational History    Occupation: Unemployed -  substitute teaching   Tobacco Use    Smoking status: Never    Smokeless tobacco: Never   Substance and Sexual Activity    Alcohol use: No    Drug use: No    Sexual activity: Not Currently     Partners: Male       Exam     Vitals:    01/31/23 0900   BP: (!) 140/87   Pulse: 68      Physical Exam:  General: She is not in acute distress. She is not ill-appearing.   HENT: Normocephalic and atraumatic. Moist mucous membranes.  Eyes: Conjunctivae normal.   Pulmonary: Pulmonary effort is normal.   Abdominal: Abdomen is soft and flat.   Skin: Skin is warm and dry. No rashes.   Psychiatric: Mood normal.        Neurologic Exam   Mental status: oriented to person, place, and time  Attention: Normal. Concentration: normal.  Speech: speech is normal.  Cranial Nerves: PERRL, EOMI intact, V1-V3 Facial sensation intact. Symmetric facies. Hearing grossly intact. Palate and uvula midline, symmetric. No tongue deviation. Trapezius strength intact.     Motor exam: bulk and tone normal. Strength 5/5 in bilateral upper extremities: deltoids, biceps, triceps, wrist flexion/extension, finger abduction/adduction. Strength 5/5 in bilateral lower extremities: hip flexion/extension, thigh adduction/abduction, knee flexion/extension, dorsiflexion/plantarflexion, foot eversion/inversion.    Reflexes: 2+ in bilateral upper extremities: biceps and brachiaradialis, 2+ in bilateral lower extremities: patellar and achilles  Plantar reflex: normal  Conner's/Clonus: negative    Sensory exam: light touch intact    Gait exam: normal  Romberg: negative  Coordination: normal finger to nose, heel to shin    Tremor: none    Labs and Imaging     Labs: reviewed  12/2022 TSH wnl    Imaging: reviewed  02/2020 MRI brain w wo contrast personally reviewed  - no acute intracranial processes    Assessment and Plan     Problem List Items Addressed This Visit          Neuro    Migraine without status migrainosus, not intractable    Migraine without aura, intractable,  without status migrainosus - Primary    Relevant Medications    ubrogepant (UBRELVY) 100 mg tablet    atogepant 60 mg Tab    magnesium oxide (MAG-OX) 400 mg (241.3 mg magnesium) tablet     This is a 52 year old female w hx of menstrual migraines s/p partial hysterectomy. Here with intractable migraines that are occurring daily, has been on sumatryptan for abortive with minimal relief. For prevention - Patient sleeps well so elavil would not be a good option for her. Has failed topiramate.   Stop sumatriptan given family hx of aneurysm and strokes  Start quilepta and magnesium 400mg daily for migraine prevention, ubrelvy for breakthrough. Patient to track how often she is taking ubrelvy    Follow-up: Follow up in about 3 months (around 4/30/2023).

## 2023-02-17 ENCOUNTER — PATIENT MESSAGE (OUTPATIENT)
Dept: FAMILY MEDICINE | Facility: CLINIC | Age: 53
End: 2023-02-17
Payer: COMMERCIAL

## 2023-02-17 DIAGNOSIS — F33.42 RECURRENT MAJOR DEPRESSIVE DISORDER, IN FULL REMISSION: Primary | ICD-10-CM

## 2023-02-17 RX ORDER — SERTRALINE HYDROCHLORIDE 100 MG/1
TABLET, FILM COATED ORAL
COMMUNITY
Start: 2013-09-09 | End: 2023-02-17 | Stop reason: SDUPTHER

## 2023-02-17 RX ORDER — SERTRALINE HYDROCHLORIDE 100 MG/1
100 TABLET, FILM COATED ORAL DAILY
Qty: 90 TABLET | Refills: 3 | Status: SHIPPED | OUTPATIENT
Start: 2023-02-17 | End: 2023-12-27

## 2023-02-17 NOTE — TELEPHONE ENCOUNTER
No new care gaps identified.  [facility] Embedded Care Gaps. Reference number: [id number]. 2/17/2023   3:52:16 PM CST

## 2023-02-21 ENCOUNTER — OFFICE VISIT (OUTPATIENT)
Dept: DERMATOLOGY | Facility: CLINIC | Age: 53
End: 2023-02-21
Payer: COMMERCIAL

## 2023-02-21 DIAGNOSIS — L70.0 ACNE VULGARIS: Primary | ICD-10-CM

## 2023-02-21 DIAGNOSIS — L65.9 HAIR LOSS: ICD-10-CM

## 2023-02-21 DIAGNOSIS — L28.1 PRURIGO NODULARIS: ICD-10-CM

## 2023-02-21 PROCEDURE — 1159F PR MEDICATION LIST DOCUMENTED IN MEDICAL RECORD: ICD-10-PCS | Mod: CPTII,95,, | Performed by: STUDENT IN AN ORGANIZED HEALTH CARE EDUCATION/TRAINING PROGRAM

## 2023-02-21 PROCEDURE — 1160F RVW MEDS BY RX/DR IN RCRD: CPT | Mod: CPTII,95,, | Performed by: STUDENT IN AN ORGANIZED HEALTH CARE EDUCATION/TRAINING PROGRAM

## 2023-02-21 PROCEDURE — 99213 PR OFFICE/OUTPT VISIT, EST, LEVL III, 20-29 MIN: ICD-10-PCS | Mod: 95,,, | Performed by: STUDENT IN AN ORGANIZED HEALTH CARE EDUCATION/TRAINING PROGRAM

## 2023-02-21 PROCEDURE — 1160F PR REVIEW ALL MEDS BY PRESCRIBER/CLIN PHARMACIST DOCUMENTED: ICD-10-PCS | Mod: CPTII,95,, | Performed by: STUDENT IN AN ORGANIZED HEALTH CARE EDUCATION/TRAINING PROGRAM

## 2023-02-21 PROCEDURE — 99213 OFFICE O/P EST LOW 20 MIN: CPT | Mod: 95,,, | Performed by: STUDENT IN AN ORGANIZED HEALTH CARE EDUCATION/TRAINING PROGRAM

## 2023-02-21 PROCEDURE — 1159F MED LIST DOCD IN RCRD: CPT | Mod: CPTII,95,, | Performed by: STUDENT IN AN ORGANIZED HEALTH CARE EDUCATION/TRAINING PROGRAM

## 2023-02-21 RX ORDER — SPIRONOLACTONE 100 MG/1
100 TABLET, FILM COATED ORAL DAILY
Qty: 90 TABLET | Refills: 1 | Status: SHIPPED | OUTPATIENT
Start: 2023-02-21 | End: 2024-02-21

## 2023-02-21 RX ORDER — CLOBETASOL PROPIONATE 0.46 MG/ML
SOLUTION TOPICAL DAILY
Qty: 50 ML | Refills: 3 | Status: SHIPPED | OUTPATIENT
Start: 2023-02-21 | End: 2023-06-09 | Stop reason: SDUPTHER

## 2023-02-21 RX ORDER — TRIAMCINOLONE ACETONIDE 1 MG/G
OINTMENT TOPICAL
Qty: 454 G | Refills: 1 | Status: SHIPPED | OUTPATIENT
Start: 2023-02-21

## 2023-02-21 NOTE — PROGRESS NOTES
The patient location is: home  The chief complaint leading to consultation is: follow-up acne, prurigo nodularis, and hair loss    Visit type: audiovisual    Face to Face time with patient: 10mins  10 minutes of total time spent on the encounter, which includes face to face time and non-face to face time preparing to see the patient (eg, review of tests), Obtaining and/or reviewing separately obtained history, Documenting clinical information in the electronic or other health record, Independently interpreting results (not separately reported) and communicating results to the patient/family/caregiver, or Care coordination (not separately reported).         Each patient to whom he or she provides medical services by telemedicine is:  (1) informed of the relationship between the physician and patient and the respective role of any other health care provider with respect to management of the patient; and (2) notified that he or she may decline to receive medical services by telemedicine and may withdraw from such care at any time.    Notes: Patient presents for follow-up, last seen on 12/21/22, for prurigo nodularis, using TAC cream with good improvement. Lesions are less bothersome and not as thickened as previous. Also treated for hair loss/thinning, using topical clobetasol solution with noticeable hair regrowth along the frontal hairline. Tolerating medications well.     ROS: Patient has noticed continued improvement in acne with stable symptoms, tolerating spironolactone well.     PE: const/neuro - AAOx3, NAD          Skin -       A/P: 1) Prurigo nodularis - improving. Continue TAC as needed. Counseled patient on gentle skin care regimen, including need for sensitive soaps/detergents, as well as need for frequent use of sensitize moisturizers.             2) Acne vulgaris - improving, symptoms stable. Continue spironolactone 100mg daily             3) Hair loss/thinning - regrowth noted. Continue topical  clobetasol daily as needed.     Refills of all medications sent.

## 2023-02-24 ENCOUNTER — PROCEDURE VISIT (OUTPATIENT)
Dept: NEUROLOGY | Facility: CLINIC | Age: 53
End: 2023-02-24
Payer: COMMERCIAL

## 2023-02-24 VITALS
HEIGHT: 63 IN | SYSTOLIC BLOOD PRESSURE: 110 MMHG | BODY MASS INDEX: 31.03 KG/M2 | HEART RATE: 71 BPM | WEIGHT: 175.13 LBS | DIASTOLIC BLOOD PRESSURE: 79 MMHG

## 2023-02-24 DIAGNOSIS — G51.31 HEMIFACIAL SPASM OF RIGHT SIDE OF FACE: ICD-10-CM

## 2023-02-24 PROCEDURE — 67345 DESTROY NERVE OF EYE MUSCLE: CPT | Mod: RT,S$GLB,, | Performed by: PSYCHIATRY & NEUROLOGY

## 2023-02-24 PROCEDURE — 67345 PR DESTROY NERVE OF EYE MUSCLE: ICD-10-PCS | Mod: RT,S$GLB,, | Performed by: PSYCHIATRY & NEUROLOGY

## 2023-02-24 NOTE — PROCEDURES
"    [facility]     PCP/Referring Provider: Ilda Bailey MD  [address] [place] [address]  Date of Service: 2/24/2023    Chief Complaint: twitching    Interval Hx    Since last visit,   Botox has wron off and spasming returned  Continues to have facial pulling in her R eye and around R mouth  But much better covered  Botox lasts 3 mos  No overweakening around R mouth since omitting this injection  Here for botox      "priorHPI: Magalis Rosales is a R HANDED 52 y.o. female with a medical issues significant for hypothyroidism, migraines, who presents with R facial twitching since Summer 2019. She noted her R face and R eye would twitch. She notes also A R ear ringing (episodic) with L cheek pain around the same time. No obvious bell's palsy beforehand. No change to taste. She visited the ER for transient slurred speech September. MRI brain wwo normal and MRA normal. No change to her facial sensation. No trouble swallowing.   No weightloss noted.      Review of Systems:   Review of Systems   Constitutional:  Negative for fever.   HENT:  Negative for congestion.    Eyes:  Negative for double vision.   Respiratory:  Negative for cough and shortness of breath.    Cardiovascular:  Negative for chest pain and leg swelling.   Gastrointestinal:  Negative for nausea.   Genitourinary:  Negative for dysuria.   Musculoskeletal:  Negative for falls.   Skin:  Negative for rash.   Neurological:  Negative for tremors, speech change and headaches.   Psychiatric/Behavioral:  Negative for depression.      Neuroleptic exposure:  None    Current Medications:  Outpatient Encounter Medications as of 2/24/2023   Medication Sig Dispense Refill    atogepant 60 mg Tab Take 60 mg by mouth once daily. 30 tablet 5    chlorhexidine (PERIDEX) 0.12 % solution       clobetasoL (TEMOVATE) 0.05 % external solution Apply topically once daily. 50 mL 3    clobetasoL (TEMOVATE) 0.05 % external solution Apply topically once " daily. 50 mL 3    clobetasoL (TEMOVATE) 0.05 % external solution Apply topically once daily. 50 mL 2    clobetasoL (TEMOVATE) 0.05 % external solution Apply topically once daily. Apply to the scalp 50 mL 3    magnesium oxide (MAG-OX) 400 mg (241.3 mg magnesium) tablet Take 1 tablet (400 mg total) by mouth once daily. 30 tablet 5    sertraline (ZOLOFT) 100 MG tablet Take 1 tablet (100 mg total) by mouth once daily. 90 tablet 3    spironolactone (ALDACTONE) 100 MG tablet Take 1 tablet (100 mg total) by mouth once daily. 90 tablet 2    spironolactone (ALDACTONE) 100 MG tablet Take 1 tablet (100 mg total) by mouth once daily. 90 tablet 1    spironolactone (ALDACTONE) 50 MG tablet Take 1 tablet (50 mg total) by mouth 2 (two) times daily. 120 tablet 1    sumatriptan (IMITREX) 50 MG tablet Take 1 tablet (50 mg total) by mouth every 8 (eight) hours as needed for Migraine. 27 tablet 3    SYNTHROID 200 mcg tablet Take 1 tablet (200 mcg total) by mouth before breakfast. 90 tablet 3    triamcinolone acetonide 0.1% (KENALOG) 0.1 % ointment AAA bid 454 g 1    azelastine (ASTELIN) 137 mcg (0.1 %) nasal spray 1 spray (137 mcg total) by Nasal route 2 (two) times daily. for 10 days 30 mL 0    [] ubrogepant (UBRELVY) 100 mg tablet Take 1 tablet (100 mg total) by mouth once as needed for Migraine. Okay to take 2nd tablet in 2 hours if mild relief, no more than 2x/week 14 tablet 5    [DISCONTINUED] clobetasoL (TEMOVATE) 0.05 % external solution Apply topically once daily. Apply to the scalp 50 mL 3    [DISCONTINUED] sumatriptan (IMITREX) 50 MG tablet TAKE 1 TABLET (50 MG TOTAL) BY MOUTH EVERY 8 (EIGHT) HOURS AS NEEDED FOR MIGRAINE. 27 tablet 3    [DISCONTINUED] triamcinolone acetonide 0.1% (KENALOG) 0.1 % ointment AAA bid 454 g 1     Facility-Administered Encounter Medications as of 2023   Medication Dose Route Frequency Provider Last Rate Last Admin    [COMPLETED] onabotulinumtoxina injection 100 Units  100 Units  Intramuscular 1 time in Clinic/HOD Ilda Bailey MD   100 Units at 23 1108       Past Medical History:  Patient Active Problem List   Diagnosis    Uterine fibroid    Dysarthria    Acquired hypothyroidism    Migraine without status migrainosus, not intractable    Bell's palsy    Hemifacial spasm    Uterine leiomyoma    IMB (intermenstrual bleeding)    Preop examination    Preop testing    S/P laparoscopic hysterectomy    Migraine without aura, intractable, without status migrainosus       Past Surgical History:  Past Surgical History:   Procedure Laterality Date    APPENDECTOMY       SECTION      HYSTERECTOMY      HYSTEROSCOPY WITH DILATION AND CURETTAGE OF UTERUS N/A 2020    Procedure: HYSTEROSCOPY, WITH DILATION AND CURETTAGE OF UTERUS;  Surgeon: Nesha Skaggs Mai, MD;  Location: [facility] OR;  Service: OB/GYN;  Laterality: N/A;  RN PRE OP 6- COVID & T&S 2020. [place]  H/P incomplete----UPT    LAPAROSCOPIC SALPINGECTOMY Bilateral 2021    Procedure: SALPINGECTOMY, LAPAROSCOPIC;  Surgeon: Nesha Skaggs Mai, MD;  Location: [facility] OR;  Service: OB/GYN;  Laterality: Bilateral;    LAPAROSCOPIC TOTAL HYSTERECTOMY N/A 2021    Procedure: HYSTERECTOMY, TOTAL, LAPAROSCOPIC;  Surgeon: Nesha Skaggs Mai, MD;  Location: [facility] OR;  Service: OB/GYN;  Laterality: N/A;  RN PREOP 21,-- COVID NEGATIVE ON -- T/S, and HCG quant done  with bilateral salpingectomy and cystoscopy  NEED CONSENTS    LASER ABLATION OF THE CERVIX         Current Living Situation: home    Social:  Social History     Socioeconomic History    Marital status: Single    Number of children: 1   Occupational History    Occupation: Unemployed - substitute teaching   Tobacco Use    Smoking status: Never    Smokeless tobacco: Never   Substance and Sexual Activity    Alcohol use: No    Drug use: No    Sexual activity: Not Currently     Partners: Male       Family History:  Family History   Problem Relation Age of Onset     "Hypertension Mother     Hypertension Father     Stroke Father     Ovarian cancer Paternal Grandmother        PHYSICAL:  /79   Pulse 71   Ht 5' 3" (1.6 m)   Wt 79.5 kg (175 lb 2.5 oz)   LMP 01/21/2021 (Approximate)   BMI 31.03 kg/m²     Focused exam  Mild HFS R in V1-2-3 - eye closue is more prominent    "General Medical Examination:  General: Good hygiene, appropriate appearance.  HEENT: Normocephalic, atraumatic.   Neck: Supple.   Chest: Unlabored breathing.   CV: Symmetric pulses.   Ext: No clubbing, cyanosis, or edema.     Mental Status:  Mood/Affect: Appropriate/congruent.  Level of consciousness: Awake, alert.  Orientation: Oriented to person, place, time and situation.  Language: No Dysarthria    Cranial nerves:  I: Not tested  II: PERRL, VFF to counting  III, IV, VI: EOMI with conjugate gaze and no nystagmus on end gaze  V: Facial sensation intact and symmetric over the bilateral V1-V3  VII: Facial muscle activation nl  VIII: Hearing intact in the b/l ears and symmetrical to finger rub  IX, X, XII: TUP midline - no atrophy or fasiculations  X: SCMs and shoulder shrug full strength b/l and symmetric        Motor:   -UE: 5/5 deltoids; 5/5 biceps, triceps; 5/5 wrist flexors, extensors; 5/5 interosseous; 5/5   -LEs: 5/5 hip flexion, extension; 5/5 knee flexion, extension; 5/5 ankle flexion, extension      DTRs:  ? Biceps Triceps Brachioradialis Knee Ankle   Left 2+ 2+ 2+ 2+ 2+   Right 2+ 2+ 2+ 2+ 2+     ? Finger taps Finger flicks ALMA Heel taps   Left 0 0 0 0   Right 0 0 0 0   Neck tone: 0  ? Arm Leg   Left 0 0   Right 0 0     Sensation:   -Light touch: Intact and symmetric in the bilateral upper and lower extremities.  -Temp: Intact and symmetric in the bilateral upper and lower extremities.  -Vibration: Intact and symmetric in the bilateral upper and lower extremities.  -Pin prick: Intact and symmetric in the bilateral upper and lower extremities.  -Proprioception: Intact and symmetric in the " "bilateral upper and lower extremities.    Coordination:   -Finger to nose: 0  -Heel to shin: 0    Gait:  -Arises from chair without use of hands.  -Casual gait is: nl based  -Stride length: nl"    Laboratory Data:  NA    Imaging:  No central lesions or peripheral lesions to explain bell's palsy  Spoke with neuro-radiology who corroborated no peripheral or central lesions    Procedure:   Patient was prepped in usual fashion with alcohol pads.     100 units of onobotulinum toxin was prepared  61 units were wasted.   Muscles and doses that were injected:     Patients signed consent forms and we answered all questions about risks and benefits of botox injections. They agreed to the procedure.  ? L R   ?orbicularis oculi superior lateral ? ?2   ?orbicularis oculi superior lateral ? ?2   orbicularis oculi  lateral  2   ?orbicularis oculi inferior lateral ? ?2   orbicularis oculi inferior lateral  2   Mentalis   5   rhizorus  5   Orbicularis oris  omitted   Above eybrow  omitted   masseter  5U   ?nasalis ? ?5U   The patient tolerated the procedure well and there were no immediate complications following the procedure.         Assessment//Plan:   Problem List Items Addressed This Visit          Neuro    Hemifacial spasm    Overview     From Bell's palsy         Current Assessment & Plan     Did well with botox for hemifacial spasm  No facial droop last injection              Ilda Bailey MD, MS  [facility]  Department of Neurology  Movement Disorders      Procedures    "

## 2023-03-05 ENCOUNTER — PATIENT MESSAGE (OUTPATIENT)
Dept: NEUROLOGY | Facility: CLINIC | Age: 53
End: 2023-03-05
Payer: COMMERCIAL

## 2023-03-07 ENCOUNTER — PATIENT MESSAGE (OUTPATIENT)
Dept: NEUROLOGY | Facility: CLINIC | Age: 53
End: 2023-03-07
Payer: COMMERCIAL

## 2023-03-21 ENCOUNTER — PATIENT MESSAGE (OUTPATIENT)
Dept: FAMILY MEDICINE | Facility: CLINIC | Age: 53
End: 2023-03-21
Payer: COMMERCIAL

## 2023-03-21 DIAGNOSIS — K92.1 HEMATOCHEZIA: Primary | ICD-10-CM

## 2023-03-28 ENCOUNTER — LAB VISIT (OUTPATIENT)
Dept: LAB | Facility: HOSPITAL | Age: 53
End: 2023-03-28
Payer: COMMERCIAL

## 2023-03-28 ENCOUNTER — OFFICE VISIT (OUTPATIENT)
Dept: SURGERY | Facility: CLINIC | Age: 53
End: 2023-03-28
Payer: COMMERCIAL

## 2023-03-28 VITALS
SYSTOLIC BLOOD PRESSURE: 112 MMHG | HEIGHT: 63 IN | HEART RATE: 76 BPM | BODY MASS INDEX: 29.92 KG/M2 | DIASTOLIC BLOOD PRESSURE: 78 MMHG | WEIGHT: 168.88 LBS

## 2023-03-28 DIAGNOSIS — K92.1 HEMATOCHEZIA: ICD-10-CM

## 2023-03-28 DIAGNOSIS — Z12.11 SCREENING FOR MALIGNANT NEOPLASM OF COLON: Primary | ICD-10-CM

## 2023-03-28 LAB
ANION GAP SERPL CALC-SCNC: 10 MMOL/L (ref 8–16)
BASOPHILS # BLD AUTO: 0.03 K/UL (ref 0–0.2)
BASOPHILS NFR BLD: 0.4 % (ref 0–1.9)
BUN SERPL-MCNC: 11 MG/DL (ref 6–20)
CALCIUM SERPL-MCNC: 9.9 MG/DL (ref 8.7–10.5)
CHLORIDE SERPL-SCNC: 102 MMOL/L (ref 95–110)
CO2 SERPL-SCNC: 25 MMOL/L (ref 23–29)
CREAT SERPL-MCNC: 0.9 MG/DL (ref 0.5–1.4)
CRP SERPL-MCNC: 1.5 MG/L (ref 0–8.2)
DIFFERENTIAL METHOD: ABNORMAL
EOSINOPHIL # BLD AUTO: 0 K/UL (ref 0–0.5)
EOSINOPHIL NFR BLD: 0.4 % (ref 0–8)
ERYTHROCYTE [DISTWIDTH] IN BLOOD BY AUTOMATED COUNT: 13.4 % (ref 11.5–14.5)
EST. GFR  (NO RACE VARIABLE): >60 ML/MIN/1.73 M^2
GLUCOSE SERPL-MCNC: 81 MG/DL (ref 70–110)
HCT VFR BLD AUTO: 43.7 % (ref 37–48.5)
HGB BLD-MCNC: 13.7 G/DL (ref 12–16)
IMM GRANULOCYTES # BLD AUTO: 0.02 K/UL (ref 0–0.04)
IMM GRANULOCYTES NFR BLD AUTO: 0.3 % (ref 0–0.5)
LYMPHOCYTES # BLD AUTO: 1.8 K/UL (ref 1–4.8)
LYMPHOCYTES NFR BLD: 24.4 % (ref 18–48)
MCH RBC QN AUTO: 28.4 PG (ref 27–31)
MCHC RBC AUTO-ENTMCNC: 31.4 G/DL (ref 32–36)
MCV RBC AUTO: 91 FL (ref 82–98)
MONOCYTES # BLD AUTO: 0.3 K/UL (ref 0.3–1)
MONOCYTES NFR BLD: 3.7 % (ref 4–15)
NEUTROPHILS # BLD AUTO: 5.2 K/UL (ref 1.8–7.7)
NEUTROPHILS NFR BLD: 70.8 % (ref 38–73)
NRBC BLD-RTO: 0 /100 WBC
PLATELET # BLD AUTO: 298 K/UL (ref 150–450)
PMV BLD AUTO: 12.2 FL (ref 9.2–12.9)
POTASSIUM SERPL-SCNC: 4.1 MMOL/L (ref 3.5–5.1)
RBC # BLD AUTO: 4.82 M/UL (ref 4–5.4)
SODIUM SERPL-SCNC: 137 MMOL/L (ref 136–145)
WBC # BLD AUTO: 7.3 K/UL (ref 3.9–12.7)

## 2023-03-28 PROCEDURE — 99204 PR OFFICE/OUTPT VISIT, NEW, LEVL IV, 45-59 MIN: ICD-10-PCS | Mod: 25,S$GLB,, | Performed by: NURSE PRACTITIONER

## 2023-03-28 PROCEDURE — 3008F PR BODY MASS INDEX (BMI) DOCUMENTED: ICD-10-PCS | Mod: CPTII,S$GLB,, | Performed by: NURSE PRACTITIONER

## 2023-03-28 PROCEDURE — 1160F RVW MEDS BY RX/DR IN RCRD: CPT | Mod: CPTII,S$GLB,, | Performed by: NURSE PRACTITIONER

## 2023-03-28 PROCEDURE — 86140 C-REACTIVE PROTEIN: CPT | Performed by: NURSE PRACTITIONER

## 2023-03-28 PROCEDURE — 3078F DIAST BP <80 MM HG: CPT | Mod: CPTII,S$GLB,, | Performed by: NURSE PRACTITIONER

## 2023-03-28 PROCEDURE — 3078F PR MOST RECENT DIASTOLIC BLOOD PRESSURE < 80 MM HG: ICD-10-PCS | Mod: CPTII,S$GLB,, | Performed by: NURSE PRACTITIONER

## 2023-03-28 PROCEDURE — 46600 DIAGNOSTIC ANOSCOPY SPX: CPT | Mod: S$GLB,,, | Performed by: NURSE PRACTITIONER

## 2023-03-28 PROCEDURE — 99204 OFFICE O/P NEW MOD 45 MIN: CPT | Mod: 25,S$GLB,, | Performed by: NURSE PRACTITIONER

## 2023-03-28 PROCEDURE — 80048 BASIC METABOLIC PNL TOTAL CA: CPT | Performed by: NURSE PRACTITIONER

## 2023-03-28 PROCEDURE — 1160F PR REVIEW ALL MEDS BY PRESCRIBER/CLIN PHARMACIST DOCUMENTED: ICD-10-PCS | Mod: CPTII,S$GLB,, | Performed by: NURSE PRACTITIONER

## 2023-03-28 PROCEDURE — 1159F PR MEDICATION LIST DOCUMENTED IN MEDICAL RECORD: ICD-10-PCS | Mod: CPTII,S$GLB,, | Performed by: NURSE PRACTITIONER

## 2023-03-28 PROCEDURE — 3074F PR MOST RECENT SYSTOLIC BLOOD PRESSURE < 130 MM HG: ICD-10-PCS | Mod: CPTII,S$GLB,, | Performed by: NURSE PRACTITIONER

## 2023-03-28 PROCEDURE — 3008F BODY MASS INDEX DOCD: CPT | Mod: CPTII,S$GLB,, | Performed by: NURSE PRACTITIONER

## 2023-03-28 PROCEDURE — 99999 PR PBB SHADOW E&M-EST. PATIENT-LVL V: ICD-10-PCS | Mod: PBBFAC,,, | Performed by: NURSE PRACTITIONER

## 2023-03-28 PROCEDURE — 46600 PR DIAG2STIC A2SCOPY: ICD-10-PCS | Mod: S$GLB,,, | Performed by: NURSE PRACTITIONER

## 2023-03-28 PROCEDURE — 36415 COLL VENOUS BLD VENIPUNCTURE: CPT | Performed by: NURSE PRACTITIONER

## 2023-03-28 PROCEDURE — 85025 COMPLETE CBC W/AUTO DIFF WBC: CPT | Performed by: NURSE PRACTITIONER

## 2023-03-28 PROCEDURE — 99999 PR PBB SHADOW E&M-EST. PATIENT-LVL V: CPT | Mod: PBBFAC,,, | Performed by: NURSE PRACTITIONER

## 2023-03-28 PROCEDURE — 1159F MED LIST DOCD IN RCRD: CPT | Mod: CPTII,S$GLB,, | Performed by: NURSE PRACTITIONER

## 2023-03-28 PROCEDURE — 3074F SYST BP LT 130 MM HG: CPT | Mod: CPTII,S$GLB,, | Performed by: NURSE PRACTITIONER

## 2023-03-28 RX ORDER — ATOGEPANT 60 MG/1
TABLET ORAL
COMMUNITY
Start: 2023-01-06 | End: 2023-03-28

## 2023-03-28 RX ORDER — UBROGEPANT 100 MG/1
TABLET ORAL
COMMUNITY
Start: 2023-03-03 | End: 2023-05-29

## 2023-03-28 RX ORDER — HYDROCORTISONE 25 MG/G
CREAM TOPICAL 2 TIMES DAILY
Qty: 28 G | Refills: 1 | Status: SHIPPED | OUTPATIENT
Start: 2023-03-28

## 2023-03-28 NOTE — PATIENT INSTRUCTIONS
ONCE CONSTIPATION RESOLVES AND IF LABS ARE NORMAL Start fiber supplement such as Fibercon (capsule) Citrucel or Metamucil every day, increase as tolerated per  instructions to achieve one to three well formed and easy to pass stools per 7 day period  Water intake of 64 oz per day  May use prescribed cortisone cream to hemorrhoids twice a day not to exceed more than 2 weeks at a time. Take 2 week medication vacation holiday then resume as needed.  Warm sitz baths as needed  Do not sit on the toilet for more than 5 mins at a time     Start taking miralax one capful twice a day x 14 days. Mix with 8-10 oz of liquid. Then start taking it once nightly there after. Miralax is generally a safe medication and can be taken long term daily.     What is constipation?Constipation is a common problem that makes it hard to have bowel movements. Your bowel movements might be:  ?Too hard  ?Too small  ?Hard to get out  ?Happening fewer than 3 times a week  What causes constipation?Constipation can be caused by:  ?Side effects of some medicines  ?Poor diet  ?Diseases of the digestive system   What other symptoms should I watch for?These symptoms could signal a more serious problem:  ?Blood in the toilet or on the toilet paper after having a bowel movement  ?Fever  ?Weight loss  ?Feeling weak  Is there anything I can do on my own to get rid of constipation?Yes. Try these steps:  ?Eat foods that have a lot of fiber. Good choices are fruits, vegetables, prune juice, and cereal  ?Drink plenty of water and other fluids.  ?When you feel the need to go to the bathroom, go to the bathroom. Don't hold it.

## 2023-03-29 ENCOUNTER — PATIENT MESSAGE (OUTPATIENT)
Dept: SURGERY | Facility: CLINIC | Age: 53
End: 2023-03-29
Payer: COMMERCIAL

## 2023-03-29 ENCOUNTER — TELEPHONE (OUTPATIENT)
Dept: ENDOSCOPY | Facility: HOSPITAL | Age: 53
End: 2023-03-29
Payer: COMMERCIAL

## 2023-03-29 NOTE — TELEPHONE ENCOUNTER
"----- Message from Berna Magana NP sent at 3/28/2023  8:55 AM CDT -----  Regarding: COLON  Procedure: Colonoscopy    Diagnosis: Rectal bleeding    Procedure Timin-4 weeks    #If within 4 weeks selected, please avani as high priority#    #If greater than 12 weeks, please select "4-12 weeks" and delay sending until 2 months prior to requested date#     Provider: Any endoscopist    Location: No Preference    Additional Scheduling Information: No scheduling concerns    Prep Specifications:CONSTIPATION PREP    Have you attached a patient to this message: yes     "

## 2023-03-29 NOTE — TELEPHONE ENCOUNTER
"----- Message from Berna Magana NP sent at 3/28/2023  8:55 AM CDT -----  Regarding: COLON  Procedure: Colonoscopy    Diagnosis: Rectal bleeding    Procedure Timin-4 weeks    #If within 4 weeks selected, please avani as high priority#    #If greater than 12 weeks, please select "4-12 weeks" and delay sending until 2 months prior to requested date#     Provider: Any endoscopist    Location: No Preference    Additional Scheduling Information: No scheduling concerns    Prep Specifications:CONSTIPATION PREP    Have you attached a patient to this message: yes     " N/A

## 2023-03-31 ENCOUNTER — CLINICAL SUPPORT (OUTPATIENT)
Dept: ENDOSCOPY | Facility: HOSPITAL | Age: 53
End: 2023-03-31
Payer: COMMERCIAL

## 2023-03-31 VITALS — BODY MASS INDEX: 28.7 KG/M2 | HEIGHT: 63 IN | WEIGHT: 162 LBS

## 2023-03-31 DIAGNOSIS — Z12.11 SCREENING FOR MALIGNANT NEOPLASM OF COLON: ICD-10-CM

## 2023-03-31 RX ORDER — SODIUM, POTASSIUM,MAG SULFATES 17.5-3.13G
SOLUTION, RECONSTITUTED, ORAL ORAL
Qty: 2 KIT | Refills: 0 | Status: SHIPPED | OUTPATIENT
Start: 2023-03-31 | End: 2023-12-27

## 2023-04-10 ENCOUNTER — ANESTHESIA EVENT (OUTPATIENT)
Dept: ENDOSCOPY | Facility: HOSPITAL | Age: 53
End: 2023-04-10
Payer: COMMERCIAL

## 2023-04-11 ENCOUNTER — HOSPITAL ENCOUNTER (OUTPATIENT)
Facility: HOSPITAL | Age: 53
Discharge: HOME | End: 2023-04-11
Attending: STUDENT IN AN ORGANIZED HEALTH CARE EDUCATION/TRAINING PROGRAM | Admitting: STUDENT IN AN ORGANIZED HEALTH CARE EDUCATION/TRAINING PROGRAM
Payer: COMMERCIAL

## 2023-04-11 ENCOUNTER — ANESTHESIA (OUTPATIENT)
Dept: ENDOSCOPY | Facility: HOSPITAL | Age: 53
End: 2023-04-11
Payer: COMMERCIAL

## 2023-04-11 VITALS
RESPIRATION RATE: 20 BRPM | DIASTOLIC BLOOD PRESSURE: 63 MMHG | SYSTOLIC BLOOD PRESSURE: 115 MMHG | OXYGEN SATURATION: 100 % | TEMPERATURE: 98 F | HEART RATE: 68 BPM

## 2023-04-11 DIAGNOSIS — Z12.11 SPECIAL SCREENING FOR MALIGNANT NEOPLASM OF COLON: ICD-10-CM

## 2023-04-11 PROCEDURE — 63600175 PHARM REV CODE 636 W HCPCS: Performed by: NURSE ANESTHETIST, CERTIFIED REGISTERED

## 2023-04-11 PROCEDURE — 25000003 PHARM REV CODE 250: Performed by: NURSE ANESTHETIST, CERTIFIED REGISTERED

## 2023-04-11 PROCEDURE — 37000009 HC ANESTHESIA EA ADD 15 MINS: Performed by: STUDENT IN AN ORGANIZED HEALTH CARE EDUCATION/TRAINING PROGRAM

## 2023-04-11 PROCEDURE — 37000008 HC ANESTHESIA 1ST 15 MINUTES: Performed by: STUDENT IN AN ORGANIZED HEALTH CARE EDUCATION/TRAINING PROGRAM

## 2023-04-11 PROCEDURE — 25000003 PHARM REV CODE 250: Performed by: ANESTHESIOLOGY

## 2023-04-11 PROCEDURE — D9220A PRA ANESTHESIA: ICD-10-PCS | Mod: CRNA,,, | Performed by: NURSE ANESTHETIST, CERTIFIED REGISTERED

## 2023-04-11 PROCEDURE — G0121 COLON CA SCRN NOT HI RSK IND: ICD-10-PCS | Mod: ,,, | Performed by: STUDENT IN AN ORGANIZED HEALTH CARE EDUCATION/TRAINING PROGRAM

## 2023-04-11 PROCEDURE — G0121 COLON CA SCRN NOT HI RSK IND: HCPCS | Performed by: STUDENT IN AN ORGANIZED HEALTH CARE EDUCATION/TRAINING PROGRAM

## 2023-04-11 PROCEDURE — D9220A PRA ANESTHESIA: ICD-10-PCS | Mod: ANES,,, | Performed by: ANESTHESIOLOGY

## 2023-04-11 PROCEDURE — D9220A PRA ANESTHESIA: Mod: CRNA,,, | Performed by: NURSE ANESTHETIST, CERTIFIED REGISTERED

## 2023-04-11 PROCEDURE — G0121 COLON CA SCRN NOT HI RSK IND: HCPCS | Mod: ,,, | Performed by: STUDENT IN AN ORGANIZED HEALTH CARE EDUCATION/TRAINING PROGRAM

## 2023-04-11 PROCEDURE — D9220A PRA ANESTHESIA: Mod: ANES,,, | Performed by: ANESTHESIOLOGY

## 2023-04-11 RX ORDER — PROPOFOL 10 MG/ML
VIAL (ML) INTRAVENOUS
Status: DISCONTINUED | OUTPATIENT
Start: 2023-04-11 | End: 2023-04-11

## 2023-04-11 RX ORDER — LIDOCAINE HYDROCHLORIDE 10 MG/ML
INJECTION, SOLUTION INTRAVENOUS
Status: DISCONTINUED | OUTPATIENT
Start: 2023-04-11 | End: 2023-04-11

## 2023-04-11 RX ORDER — SODIUM CHLORIDE 9 MG/ML
INJECTION, SOLUTION INTRAVENOUS CONTINUOUS
Status: DISCONTINUED | OUTPATIENT
Start: 2023-04-11 | End: 2023-04-11 | Stop reason: HOSPADM

## 2023-04-11 RX ORDER — PROPOFOL 10 MG/ML
INJECTION, EMULSION INTRAVENOUS
Status: DISCONTINUED
Start: 2023-04-11 | End: 2023-04-11 | Stop reason: HOSPADM

## 2023-04-11 RX ORDER — LIDOCAINE HYDROCHLORIDE 10 MG/ML
INJECTION, SOLUTION EPIDURAL; INFILTRATION; INTRACAUDAL; PERINEURAL
Status: DISCONTINUED
Start: 2023-04-11 | End: 2023-04-11 | Stop reason: HOSPADM

## 2023-04-11 RX ADMIN — SODIUM CHLORIDE: 0.9 INJECTION, SOLUTION INTRAVENOUS at 08:04

## 2023-04-11 RX ADMIN — PROPOFOL 40 MG: 10 INJECTION, EMULSION INTRAVENOUS at 08:04

## 2023-04-11 RX ADMIN — PROPOFOL 100 MG: 10 INJECTION, EMULSION INTRAVENOUS at 08:04

## 2023-04-11 RX ADMIN — LIDOCAINE HYDROCHLORIDE 50 MG: 10 INJECTION, SOLUTION INTRAVENOUS at 08:04

## 2023-04-11 NOTE — TRANSFER OF CARE
Anesthesia Transfer of Care Note    Patient: Magalis Rosales    Procedure(s) Performed: Procedure(s) (LRB):  COLONOSCOPY (N/A)    Patient location: GI    Anesthesia Type: general    Transport from OR: Transported from OR on room air with adequate spontaneous ventilation    Post pain: adequate analgesia    Post assessment: no apparent anesthetic complications and tolerated procedure well    Post vital signs: stable    Level of consciousness: sedated    Nausea/Vomiting: no nausea/vomiting    Complications: none    Transfer of care protocol was followed      Last vitals:   Visit Vitals  BP (!) 96/56 (BP Location: Left arm, Patient Position: Lying)   Pulse 77   Temp 36.8 °C (98.2 °F) (Oral)   Resp 14   LMP 01/21/2021 (Approximate)   SpO2 98%   Breastfeeding No

## 2023-04-11 NOTE — H&P
Short Stay Endoscopy History and Physical    PCP - Brock Womack MD  Referring Physician - PRE-ADMIT, ENDO -[facility]  No address on file    Procedure - Colonoscopy  ASA - per anesthesia  Mallampati - per anesthesia  History of Anesthesia problems - no  Family history Anesthesia problems -  no   Plan of anesthesia - General    HPI  52 y.o. female  Reason for procedure:   Screening for malignant neoplasm of colon [Z12.11]        ROS:  Constitutional: No fevers, chills, No weight loss  CV: No chest pain  Pulm: No cough, No shortness of breath  GI: see HPI    Medical History:  has a past medical history of History of psychiatric care, History of psychiatric hospitalization, Hypothyroidism, Migraines, Psychiatric exam requested by authority, Psychiatric problem, and Therapy.    Surgical History:  has a past surgical history that includes Appendectomy;  section; Laser ablation of the cervix; Hysteroscopy with dilation and curettage of uterus (N/A, 2020); Laparoscopic total hysterectomy (N/A, 2021); Laparoscopic salpingectomy (Bilateral, 2021); and Hysterectomy.    Family History: family history includes Hypertension in her father and mother; Ovarian cancer in her paternal grandmother; Stroke in her father..    Social History:  reports that she has never smoked. She has never used smokeless tobacco. She reports that she does not drink alcohol and does not use drugs.    Review of patient's allergies indicates:  No Known Allergies    Medications:   Medications Prior to Admission   Medication Sig Dispense Refill Last Dose    atogepant 60 mg Tab Take 60 mg by mouth once daily. 30 tablet 5     azelastine (ASTELIN) 137 mcg (0.1 %) nasal spray 1 spray (137 mcg total) by Nasal route 2 (two) times daily. for 10 days 30 mL 0     chlorhexidine (PERIDEX) 0.12 % solution        clobetasoL (TEMOVATE) 0.05 % external solution Apply topically once daily. 50 mL 3     clobetasoL (TEMOVATE) 0.05 %  external solution Apply topically once daily. 50 mL 3     clobetasoL (TEMOVATE) 0.05 % external solution Apply topically once daily. 50 mL 2     clobetasoL (TEMOVATE) 0.05 % external solution Apply topically once daily. Apply to the scalp 50 mL 3     hydrocortisone (ANUSOL-HC) 2.5 % rectal cream Place rectally 2 (two) times daily. 28 g 1     magnesium oxide (MAG-OX) 400 mg (241.3 mg magnesium) tablet Take 1 tablet (400 mg total) by mouth once daily. 30 tablet 5     sertraline (ZOLOFT) 100 MG tablet Take 1 tablet (100 mg total) by mouth once daily. 90 tablet 3     sodium,potassium,mag sulfates (SUPREP BOWEL PREP KIT) 17.5-3.13-1.6 gram SolR Follow instructions given by Endoscopy scheduling nurse 2 kit 0     spironolactone (ALDACTONE) 100 MG tablet Take 1 tablet (100 mg total) by mouth once daily. 90 tablet 2     spironolactone (ALDACTONE) 100 MG tablet Take 1 tablet (100 mg total) by mouth once daily. 90 tablet 1     spironolactone (ALDACTONE) 50 MG tablet Take 1 tablet (50 mg total) by mouth 2 (two) times daily. 120 tablet 1     sumatriptan (IMITREX) 50 MG tablet Take 1 tablet (50 mg total) by mouth every 8 (eight) hours as needed for Migraine. 27 tablet 3     SYNTHROID 200 mcg tablet Take 1 tablet (200 mcg total) by mouth before breakfast. 90 tablet 3     triamcinolone acetonide 0.1% (KENALOG) 0.1 % ointment AAA bid 454 g 1     UBRELVY 100 mg tablet Take by mouth.          Physical Exam:    Vital Signs: There were no vitals filed for this visit.    General Appearance: Well appearing in no acute distress  Abdomen: Soft, non tender, non distended with normal bowel sounds, no masses    Labs:  Lab Results   Component Value Date    WBC 7.30 03/28/2023    HGB 13.7 03/28/2023    HCT 43.7 03/28/2023     03/28/2023    CHOL 173 09/18/2019    TRIG 174 (H) 09/18/2019    HDL 60 09/18/2019    ALT 19 12/27/2022    AST 30 12/27/2022     03/28/2023    K 4.1 03/28/2023     03/28/2023    CREATININE 0.9 03/28/2023     BUN 11 03/28/2023    CO2 25 03/28/2023    TSH 2.976 12/23/2022    INR 1.0 09/18/2019    HGBA1C 5.0 04/15/2021       I have explained the risks and benefits of this endoscopic procedure to the patient including but not limited to bleeding, inflammation, infection, perforation, and death.    Assessment/Plan:     CRC Screening     - Proceed with colonoscopy     Emily Fairbanks MD  Gastroenterology   [facility]

## 2023-04-11 NOTE — OR NURSING
PROCEDURE AND RECOVERY COMPLETED. DR. WRIGHT DISCUSSED FINDINGS WITH PATIENT AND DISCHARGE INSTRUCTIONS GIVEN; UNDERSTANDING VERBALIZED; ASSISTED UP WITHOUT UNTOWARD EFFECTS. PATIENT READY FOR DISCHARGE

## 2023-04-11 NOTE — ANESTHESIA PREPROCEDURE EVALUATION
04/11/2023    Pre-operative evaluation for Procedure(s) (LRB):  COLONOSCOPY (N/A)    Magalis Rosales is a 52 y.o. female     Patient Active Problem List   Diagnosis    Uterine fibroid    Dysarthria    Acquired hypothyroidism    Migraine without status migrainosus, not intractable    Bell's palsy    Hemifacial spasm    Uterine leiomyoma    IMB (intermenstrual bleeding)    Preop examination    Preop testing    S/P laparoscopic hysterectomy    Migraine without aura, intractable, without status migrainosus       Review of patient's allergies indicates:  No Known Allergies    No current facility-administered medications on file prior to encounter.     Current Outpatient Medications on File Prior to Encounter   Medication Sig Dispense Refill    atogepant 60 mg Tab Take 60 mg by mouth once daily. 30 tablet 5    chlorhexidine (PERIDEX) 0.12 % solution       clobetasoL (TEMOVATE) 0.05 % external solution Apply topically once daily. 50 mL 3    clobetasoL (TEMOVATE) 0.05 % external solution Apply topically once daily. 50 mL 2    clobetasoL (TEMOVATE) 0.05 % external solution Apply topically once daily. Apply to the scalp 50 mL 3    hydrocortisone (ANUSOL-HC) 2.5 % rectal cream Place rectally 2 (two) times daily. 28 g 1    magnesium oxide (MAG-OX) 400 mg (241.3 mg magnesium) tablet Take 1 tablet (400 mg total) by mouth once daily. 30 tablet 5    sertraline (ZOLOFT) 100 MG tablet Take 1 tablet (100 mg total) by mouth once daily. 90 tablet 3    sodium,potassium,mag sulfates (SUPREP BOWEL PREP KIT) 17.5-3.13-1.6 gram SolR Follow instructions given by Endoscopy scheduling nurse 2 kit 0    spironolactone (ALDACTONE) 100 MG tablet Take 1 tablet (100 mg total) by mouth once daily. 90 tablet 2    spironolactone (ALDACTONE) 100 MG tablet Take 1 tablet (100 mg total) by mouth once daily. 90 tablet 1     spironolactone (ALDACTONE) 50 MG tablet Take 1 tablet (50 mg total) by mouth 2 (two) times daily. 120 tablet 1    sumatriptan (IMITREX) 50 MG tablet Take 1 tablet (50 mg total) by mouth every 8 (eight) hours as needed for Migraine. 27 tablet 3    SYNTHROID 200 mcg tablet Take 1 tablet (200 mcg total) by mouth before breakfast. 90 tablet 3    triamcinolone acetonide 0.1% (KENALOG) 0.1 % ointment AAA bid 454 g 1    UBRELVY 100 mg tablet Take by mouth.      azelastine (ASTELIN) 137 mcg (0.1 %) nasal spray 1 spray (137 mcg total) by Nasal route 2 (two) times daily. for 10 days 30 mL 0       Past Surgical History:   Procedure Laterality Date    APPENDECTOMY       SECTION      HYSTERECTOMY      HYSTEROSCOPY WITH DILATION AND CURETTAGE OF UTERUS N/A 2020    Procedure: HYSTEROSCOPY, WITH DILATION AND CURETTAGE OF UTERUS;  Surgeon: Nesha Skaggs Mai, MD;  Location: [facility] OR;  Service: OB/GYN;  Laterality: N/A;  RN PRE OP 6- COVID & T&S 2020. CA  H/P incomplete----UPT    LAPAROSCOPIC SALPINGECTOMY Bilateral 2021    Procedure: SALPINGECTOMY, LAPAROSCOPIC;  Surgeon: Nesha Skaggs Mai, MD;  Location: [facility] OR;  Service: OB/GYN;  Laterality: Bilateral;    LAPAROSCOPIC TOTAL HYSTERECTOMY N/A 2021    Procedure: HYSTERECTOMY, TOTAL, LAPAROSCOPIC;  Surgeon: Nesha Skaggs Mai, MD;  Location: [facility] OR;  Service: OB/GYN;  Laterality: N/A;  RN PREOP 21,-- COVID NEGATIVE ON -- T/S, and HCG quant done  with bilateral salpingectomy and cystoscopy  NEED CONSENTS    LASER ABLATION OF THE CERVIX         Social History     Socioeconomic History    Marital status: Single    Number of children: 1   Occupational History    Occupation: Unemployed - substitute teaching   Tobacco Use    Smoking status: Never    Smokeless tobacco: Never   Substance and Sexual Activity    Alcohol use: No    Drug use: No    Sexual activity: Not Currently     Partners: Male           Pre-op Assessment    I have  reviewed the Patient Summary Reports.     I have reviewed the Nursing Notes. I have reviewed the NPO Status.      Review of Systems  Anesthesia Hx:  No problems with previous Anesthesia   Denies Personal Hx of Anesthesia complications.   Social:  Non-Smoker, No Alcohol Use    Hematology/Oncology:         -- Anemia:   EENT/Dental:EENT/Dental Normal   Cardiovascular:   Exercise tolerance: good ECG has been reviewed.    Pulmonary:  Pulmonary Normal    Hepatic/GI:   Bowel Prep.    Neurological:   Headaches    Endocrine:   Hypothyroidism        Physical Exam  General: Well nourished, Cooperative, Alert and Oriented    Airway:  Mallampati: I   Mouth Opening: Normal  TM Distance: Normal  Tongue: Normal  Neck ROM: Normal ROM    Dental:  Intact    Chest/Lungs:  Normal Respiratory Rate    Heart:  Rate: Normal  Rhythm: Regular Rhythm  Sounds: Normal        Anesthesia Plan  Type of Anesthesia, risks & benefits discussed:    Anesthesia Type: Gen Natural Airway  Intra-op Monitoring Plan: Standard ASA Monitors  Induction:  IV  Informed Consent: Informed consent signed with the Patient and all parties understand the risks and agree with anesthesia plan.  All questions answered.   ASA Score: 2    Ready For Surgery From Anesthesia Perspective.     .

## 2023-04-11 NOTE — ANESTHESIA POSTPROCEDURE EVALUATION
Anesthesia Post Evaluation    Patient: Magalis Rosales    Procedure(s) Performed: Procedure(s) (LRB):  COLONOSCOPY (N/A)    Final Anesthesia Type: general      Patient location during evaluation: GI PACU  Patient participation: Yes- Able to Participate  Level of consciousness: awake and alert and oriented  Post-procedure vital signs: reviewed and stable  Pain management: adequate  Airway patency: patent    PONV status at discharge: No PONV  Anesthetic complications: no      Cardiovascular status: blood pressure returned to baseline, hemodynamically stable and stable  Respiratory status: unassisted, spontaneous ventilation and room air  Hydration status: euvolemic  Follow-up not needed.          Vitals Value Taken Time   /63 04/11/23 0922   Temp 36.8 °C (98.2 °F) 04/11/23 0852   Pulse 64 04/11/23 0907   Resp 18 04/11/23 0907   SpO2 100 % 04/11/23 0907         No case tracking events are documented in the log.      Pain/Umm Score: Umm Score: 10 (4/11/2023  9:22 AM)

## 2023-04-11 NOTE — PROVATION PATIENT INSTRUCTIONS
Discharge Summary/Instructions after an Endoscopic Procedure  Patient Name: Magalis Rosales  Patient MRN: [id number]  Patient YOB: 1970 Tuesday, April 11, 2023  Emily Fairbanks MD  Dear patient,  As a result of recent federal legislation (The Federal Cures Act), you may   receive lab or pathology results from your procedure in your [facility]   account before your physician is able to contact you. Your physician or   their representative will relay the results to you with their   recommendations at their soonest availability.  Thank you,  RESTRICTIONS:  During your procedure today, you received medications for sedation.  These   medications may affect your judgment, balance and coordination.  Therefore,   for 24 hours, you have the following restrictions:   - DO NOT drive a car, operate machinery, make legal/financial decisions,   sign important papers or drink alcohol.    ACTIVITY:  Today: no heavy lifting, straining or running due to procedural   sedation/anesthesia.  The following day: return to full activity including work.  DIET:  Eat and drink normally unless instructed otherwise.     TREATMENT FOR COMMON SIDE EFFECTS:  - Mild abdominal pain, nausea, belching, bloating or excessive gas:  rest,   eat lightly and use a heating pad.  - Sore Throat: treat with throat lozenges and/or gargle with warm salt   water.  - Because air was used during the procedure, expelling large amounts of air   from your rectum or belching is normal.  - If a bowel prep was taken, you may not have a bowel movement for 1-3 days.    This is normal.  SYMPTOMS TO WATCH FOR AND REPORT TO YOUR PHYSICIAN:  1. Abdominal pain or bloating, other than gas cramps.  2. Chest pain.  3. Back pain.  4. Signs of infection such as: chills or fever occurring within 24 hours   after the procedure.  5. Rectal bleeding, which would show as bright red, maroon, or black stools.   (A tablespoon of blood from the rectum is not serious, especially  if   hemorrhoids are present.)  6. Vomiting.  7. Weakness or dizziness.  GO DIRECTLY TO THE NEAREST EMERGENCY ROOM IF YOU HAVE ANY OF THE FOLLOWING:      Difficulty breathing              Chills and/or fever over 101 F   Persistent vomiting and/or vomiting blood   Severe abdominal pain   Severe chest pain   Black, tarry stools   Bleeding- more than one tablespoon   Any other symptom or condition that you feel may need urgent attention  Your doctor recommends these additional instructions:  If any biopsies were taken, your doctors clinic will contact you in 1 to 2   weeks with any results.  - Discharge patient to home (ambulatory).   - Resume regular diet.   - Continue present medications.   - Repeat colonoscopy in 10 years for screening purposes.  For questions, problems or results please call your physician - Emily Fairbanks MD at Work:  [phone].  [facility] can be reached at [phone]     IF A COMPLICATION OR EMERGENCY SITUATION ARISES AND YOU ARE UNABLE TO REACH   YOUR PHYSICIAN - GO DIRECTLY TO THE EMERGENCY ROOM.  Emily Fairbanks MD  4/11/2023 8:53:15 AM  This report has been verified and signed electronically.  Dear patient,  As a result of recent federal legislation (The Federal Cures Act), you may   receive lab or pathology results from your procedure in your [facility]   account before your physician is able to contact you. Your physician or   their representative will relay the results to you with their   recommendations at their soonest availability.  Thank you,  PROVATION

## 2023-04-12 ENCOUNTER — PATIENT MESSAGE (OUTPATIENT)
Dept: NEUROLOGY | Facility: CLINIC | Age: 53
End: 2023-04-12
Payer: COMMERCIAL

## 2023-04-12 ENCOUNTER — TELEPHONE (OUTPATIENT)
Dept: NEUROLOGY | Facility: CLINIC | Age: 53
End: 2023-04-12
Payer: COMMERCIAL

## 2023-04-12 NOTE — TELEPHONE ENCOUNTER
----- Message from Rosa Wagner sent at 4/12/2023  3:48 PM CDT -----  Contact: pt @ [phone]  Magalis Rosales calling regarding Patient Advice (message) for #pt is returning call from Vanessa and will like to keep the 6/2 appt at 9am. Asking for call back

## 2023-04-17 NOTE — H&P
"[facility] Medicine  History & Physical    Patient Name: Magalis Rosales  MRN: [id number]  Admission Date: 2019  Attending Physician: Fabio Rivas MD   Primary Care Provider: Brock Womack MD         Patient information was obtained from patient, past medical records and ER records.     Subjective:     Principal Problem:Dysarthria    Chief Complaint:   Chief Complaint   Patient presents with    Otalgia     pt reports right ear has been poudning for two months. today states she felt twitching in the right side of the face and also felt like her speech was slurred while at work.        HPI: Magalis Rosales 48 y.o. female with hypothyroidism, migraines presents to the hospital with a chief complaint of speech difficulty. She reports today she began to experience difficulty speaking and felt as though she was slurring her speech. It has been constant all day. She does not remember seeing any facial droop. She has also been experiencing left sided "facial twitching" that she has been followed with neurology at . She also experienced a left sided headache described as a migraine without radiation that was relieved with Fioricet in the ED. She denies any numbness or weakness in an extremity. She was recently seen at  and had an MRI with neurology. She reports they then recommended further nerve testing, but the patient never followed up. She denies fever, chest pain, LOC, head trauma, dizziness, numbness, visual disturbance.     In the ED, TSH of 12 with T4 0.87, CT without acute abnormality, electrolytes within normal limits.     Past Medical History:   Diagnosis Date    History of psychiatric care     History of psychiatric hospitalization     Hypothyroidism     Migraines     Psychiatric exam requested by authority     Psychiatric problem     Therapy        Past Surgical History:   Procedure Laterality Date    APPENDECTOMY       SECTION         Review of patient's " Called Ms. Guerrero to discuss MRI results and my  recommendations. I spoke with Dr. Delgado, Orthopedic Spine Physician and Dr. Sanchez, Pain Treatment Physician. Dr. Delgado says that there is nothing surgical and weight loss would help with the epidural lipomatosis. He also stated that injections from pain treatment might help with pain. I called Dr. Sanchez who agreed Ms. Guerrero might be helped with spinal injections. I verbalized to Ms. Guerrero that the epidural Lipomatosis is not work related and not due to the accident she had while at work. I also told Ms. 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 I told Ms. Guerrero that workers comp. May not pay for pain treatment referral or injections but that I would make the referral any way. She agreed and thanked me.    allergies indicates:  No Known Allergies    No current facility-administered medications on file prior to encounter.      Current Outpatient Medications on File Prior to Encounter   Medication Sig    sertraline (ZOLOFT) 100 MG tablet Take 100 mg by mouth once daily. Take 1 and half tab by mouth every day    levothyroxine (SYNTHROID, LEVOTHROID) 175 MCG tablet Take 1 tablet (175 mcg total) by mouth once daily.    sumatriptan (IMITREX) 50 MG tablet TAKE 1 TABLET BY MOUTH AT ONSET OF HEADACHE, MAY REPEAT IN 2 HOURS. NOT MORE THAN 2 IN 24 HOURS    topiramate (TOPAMAX) 100 MG tablet Take 100 mg by mouth 2 (two) times daily.     Family History     Problem Relation (Age of Onset)    Hypertension Mother, Father    Stroke Father        Tobacco Use    Smoking status: Never Smoker    Smokeless tobacco: Never Used   Substance and Sexual Activity    Alcohol use: No    Drug use: No    Sexual activity: Not Currently     Partners: Male     Review of Systems   Constitutional: Negative for chills and fever.   HENT: Negative for nosebleeds and tinnitus.    Eyes: Negative for photophobia and visual disturbance.   Respiratory: Negative for shortness of breath and wheezing.    Cardiovascular: Negative for chest pain, palpitations and leg swelling.   Gastrointestinal: Negative for abdominal distention, nausea and vomiting.   Genitourinary: Negative for dysuria, flank pain and hematuria.   Musculoskeletal: Negative for gait problem and joint swelling.   Skin: Negative for rash and wound.   Neurological: Positive for facial asymmetry, speech difficulty and headaches. Negative for seizures, syncope and numbness.     Objective:     Vital Signs (Most Recent):  Temp: 98.7 °F (37.1 °C) (09/17/19 2053)  Pulse: 62 (09/17/19 2033)  Resp: 18 (09/17/19 2033)  BP: 120/77 (09/17/19 2033)  SpO2: 100 % (09/17/19 2033) Vital Signs (24h Range):  Temp:  [98.5 °F (36.9 °C)-98.7 °F (37.1 °C)] 98.7 °F (37.1 °C)  Pulse:  [61-91] 62  Resp:  [16-18]  18  SpO2:  [100 %] 100 %  BP: (105-140)/(57-80) 120/77     Weight: 67.6 kg (149 lb)  Body mass index is 26.39 kg/m².    Physical Exam   Constitutional: She is oriented to person, place, and time. She appears well-developed and well-nourished. No distress.   HENT:   Head: Normocephalic and atraumatic.   Right Ear: External ear normal.   Left Ear: External ear normal.   Eyes: Conjunctivae and EOM are normal. Right eye exhibits no discharge. Left eye exhibits no discharge.   Neck: Normal range of motion. No thyromegaly present.   Cardiovascular: Normal rate and regular rhythm.   No murmur heard.  Pulmonary/Chest: Effort normal and breath sounds normal. No respiratory distress.   Abdominal: Soft. Bowel sounds are normal. She exhibits no distension and no mass. There is no tenderness.   Musculoskeletal: She exhibits no edema or deformity.   Neurological: She is alert and oriented to person, place, and time.   5/5 strength upper and lower extremity. Able to open mouth and protrude jaw as well as move left and right. No sensation deficit. No slurred speech noted   Skin: Skin is warm and dry.   Psychiatric: She has a normal mood and affect. Her behavior is normal.   Nursing note and vitals reviewed.        CRANIAL NERVES     CN III, IV, VI   Extraocular motions are normal.        Significant Labs:   CBC:   Recent Labs   Lab 09/17/19  1806   WBC 8.72   HGB 9.5*   HCT 31.9*        CMP:   Recent Labs   Lab 09/17/19  1806      K 3.7      CO2 23   GLU 68*   BUN 14   CREATININE 1.0   CALCIUM 8.7   PROT 7.8   ALBUMIN 3.6   BILITOT 0.2   ALKPHOS 78   AST 39   ALT 23   ANIONGAP 8   EGFRNONAA >60     TSH:   Recent Labs   Lab 09/17/19  1806   TSH 12.744*       Significant Imaging:   Imaging Results          CT Head Without Contrast (Final result)  Result time 09/17/19 18:20:30    Final result by Parul Banegas MD (09/17/19 18:20:30)                 Impression:      No acute intracranial  abnormality.      Electronically signed by: Parul Banegas  Date:    09/17/2019  Time:    18:20             Narrative:    EXAMINATION:  CT OF THE HEAD WITHOUT    CLINICAL HISTORY:  Focal neuro deficit, new, fixed or worsening, >6 hours;    TECHNIQUE:  5 mm unenhanced axial images were obtained from the skull base to the vertex.    COMPARISON:  None.    FINDINGS:  The ventricles, basal cisterns, and cortical sulci are within normal limits for patient's stated age.  There is an age-indeterminate lacune in the right caudate head (series 2 axial image 13).  There is no acute intracranial hemorrhage, territorial infarct or mass effect, or midline shift. The visualized paranasal sinuses and mastoid air cells are clear.                                  Assessment/Plan:     * Dysarthria  CT head without acute abnormality. No facial droop or slurred speech on exam, patient reports difficulty speaking words.   -MRI/MRA/Echo pending  -Neurology consulted  -continue aspirin  -SLP consulted  -Telemetry  -Nursing swallow assessment  -neuro checks, fall precautions, aspiration precautions  -A1c/Lipid pending    Migraines  Reports migraine earlier in day, but resolved with fioricet in ED. Continue home topamax/zoloft.    Hypothyroid  TSH of 12 today, but T4 within normal limits. Continue home TSH      VTE Risk Mitigation (From admission, onward)        Ordered     IP VTE LOW RISK PATIENT  Once      09/17/19 2057     Place sequential compression device  Until discontinued      09/17/19 2057         VTE: SCD  Code: Full  Diet: Cardiac pending nursing swallow  Dispo: pending MRI/neuro ivy Babb PA-C  Department of Hospital Medicine   [facility]

## 2023-05-15 DIAGNOSIS — G43.829 MENSTRUAL MIGRAINE WITHOUT STATUS MIGRAINOSUS, NOT INTRACTABLE: ICD-10-CM

## 2023-05-15 RX ORDER — TOPIRAMATE 100 MG/1
TABLET, FILM COATED ORAL
Qty: 60 TABLET | Refills: 0 | OUTPATIENT
Start: 2023-05-15

## 2023-05-29 ENCOUNTER — OFFICE VISIT (OUTPATIENT)
Dept: NEUROLOGY | Facility: CLINIC | Age: 53
End: 2023-05-29
Payer: COMMERCIAL

## 2023-05-29 VITALS
BODY MASS INDEX: 29.02 KG/M2 | HEIGHT: 63 IN | DIASTOLIC BLOOD PRESSURE: 77 MMHG | WEIGHT: 163.81 LBS | SYSTOLIC BLOOD PRESSURE: 119 MMHG | HEART RATE: 68 BPM

## 2023-05-29 DIAGNOSIS — G43.019 MIGRAINE WITHOUT AURA, INTRACTABLE, WITHOUT STATUS MIGRAINOSUS: Primary | ICD-10-CM

## 2023-05-29 DIAGNOSIS — G51.31 HEMIFACIAL SPASM OF RIGHT SIDE OF FACE: ICD-10-CM

## 2023-05-29 PROCEDURE — 99214 PR OFFICE/OUTPT VISIT, EST, LEVL IV, 30-39 MIN: ICD-10-PCS | Mod: S$GLB,,, | Performed by: STUDENT IN AN ORGANIZED HEALTH CARE EDUCATION/TRAINING PROGRAM

## 2023-05-29 PROCEDURE — 3008F PR BODY MASS INDEX (BMI) DOCUMENTED: ICD-10-PCS | Mod: CPTII,S$GLB,, | Performed by: STUDENT IN AN ORGANIZED HEALTH CARE EDUCATION/TRAINING PROGRAM

## 2023-05-29 PROCEDURE — 3074F PR MOST RECENT SYSTOLIC BLOOD PRESSURE < 130 MM HG: ICD-10-PCS | Mod: CPTII,S$GLB,, | Performed by: STUDENT IN AN ORGANIZED HEALTH CARE EDUCATION/TRAINING PROGRAM

## 2023-05-29 PROCEDURE — 3008F BODY MASS INDEX DOCD: CPT | Mod: CPTII,S$GLB,, | Performed by: STUDENT IN AN ORGANIZED HEALTH CARE EDUCATION/TRAINING PROGRAM

## 2023-05-29 PROCEDURE — 1159F PR MEDICATION LIST DOCUMENTED IN MEDICAL RECORD: ICD-10-PCS | Mod: CPTII,S$GLB,, | Performed by: STUDENT IN AN ORGANIZED HEALTH CARE EDUCATION/TRAINING PROGRAM

## 2023-05-29 PROCEDURE — 3078F PR MOST RECENT DIASTOLIC BLOOD PRESSURE < 80 MM HG: ICD-10-PCS | Mod: CPTII,S$GLB,, | Performed by: STUDENT IN AN ORGANIZED HEALTH CARE EDUCATION/TRAINING PROGRAM

## 2023-05-29 PROCEDURE — 1159F MED LIST DOCD IN RCRD: CPT | Mod: CPTII,S$GLB,, | Performed by: STUDENT IN AN ORGANIZED HEALTH CARE EDUCATION/TRAINING PROGRAM

## 2023-05-29 PROCEDURE — 99999 PR PBB SHADOW E&M-EST. PATIENT-LVL III: ICD-10-PCS | Mod: PBBFAC,,, | Performed by: STUDENT IN AN ORGANIZED HEALTH CARE EDUCATION/TRAINING PROGRAM

## 2023-05-29 PROCEDURE — 3078F DIAST BP <80 MM HG: CPT | Mod: CPTII,S$GLB,, | Performed by: STUDENT IN AN ORGANIZED HEALTH CARE EDUCATION/TRAINING PROGRAM

## 2023-05-29 PROCEDURE — 3074F SYST BP LT 130 MM HG: CPT | Mod: CPTII,S$GLB,, | Performed by: STUDENT IN AN ORGANIZED HEALTH CARE EDUCATION/TRAINING PROGRAM

## 2023-05-29 PROCEDURE — 99214 OFFICE O/P EST MOD 30 MIN: CPT | Mod: S$GLB,,, | Performed by: STUDENT IN AN ORGANIZED HEALTH CARE EDUCATION/TRAINING PROGRAM

## 2023-05-29 PROCEDURE — 99999 PR PBB SHADOW E&M-EST. PATIENT-LVL III: CPT | Mod: PBBFAC,,, | Performed by: STUDENT IN AN ORGANIZED HEALTH CARE EDUCATION/TRAINING PROGRAM

## 2023-05-29 RX ORDER — LANOLIN ALCOHOL/MO/W.PET/CERES
400 CREAM (GRAM) TOPICAL DAILY
Qty: 30 TABLET | Refills: 5 | Status: SHIPPED | OUTPATIENT
Start: 2023-05-29 | End: 2023-11-14

## 2023-05-29 RX ORDER — UBROGEPANT 100 MG/1
100 TABLET ORAL ONCE AS NEEDED
Qty: 16 TABLET | Refills: 5 | Status: SHIPPED | OUTPATIENT
Start: 2023-05-29 | End: 2023-05-29

## 2023-05-29 NOTE — PROGRESS NOTES
Chief Complaint and Duration     Chief Complaint   Patient presents with    Headache   Since 2007, worsened recently    History of Present Illness     Magalis Rosales is a 52 y.o. R handed female with a history of multiple medical diagnoses as listed below that presents for evaluation and treatment of headache. She does have a headache at this time.    Started in 2007, gradually worse, no inciting event. Was related to menstrual cycle prior, has had partial hysterectomy. Now, happening every day.     Hx of strokes in family (aunt had aneurysm, dad has had strokes)    Medications tried - topiramate (failed), tramadol (stopped)  Abortives - imitrex currently    Sleeps well at night.     Interim period:  5/29/2023  Had 2 breakthrough migraines in the past month, no known triggers. Used 2 ubrelvy in past month. No changes in characteristics of migraines. Good sleep. No recent illnesses.      Description of Headaches:  Location of pain: frontal or temporal or apical  Radiation of pain?: radiates occipitally  Character of pain: stabbing  Severity of pain: can get up to 10/10  Accompanying symptoms: photosensitivity, phonophobia, nausea  Prodromal sx?: none  Rapidity of onset: gradual  Typical duration of individual headache: if take medication, works within 30 minutes. Or can last all day  Are most headaches similar in presentation? yes  Typical precipitants: no significant precipitants    Temporal Pattern of Headaches:  Started having HA's since 2007  Worst time of day: afternoon evening  Awaken from sleep?: yes - the tension builds  Seasonal pattern?: no  Clustering of HA's over time? 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 no  History of head/neck surgery? no  Family h/o headache problems? no  Use of meds that might worsen HA's (nitrates, exogenous  estrogens,    Nifedipine)? no  Exposure to carbon monoxide? no  Substance use: no    Review of Systems: (positive bolded)  Constitutional: Negative for fatigue, activity change, fevers, or chills.   HENT:  Negative for new visual disturbances or difficulty swallowing.    Respiratory:  Negative for shortness of breath.    Cardiovascular:  Negative for palpitations.   Gastrointestinal:  Negative for constipation, nausea, or vomiting.   Endocrine: Negative for temperature instability/intolerance.   Genitourinary:  Negative for difficulty urinating.   Musculoskeletal:  Negative for neck pain, back pain, myalgias, joint swelling, or gait problem.  Skin:  Negative for rash or lesions.   Neurological:  Negative for seizures, headaches, dizziness, tremors, syncope, speech difficulty, weakness, light-headedness, or numbness.   Hematological:  Does not bruise/bleed easily.   Psychiatric/Behavioral: Negative for decreased concentration or sleep disturbance.    Review of patient's allergies indicates:  No Known Allergies  Current Outpatient Medications   Medication Sig Dispense Refill    atogepant 60 mg Tab Take 60 mg by mouth once daily. 30 tablet 5    azelastine (ASTELIN) 137 mcg (0.1 %) nasal spray 1 spray (137 mcg total) by Nasal route 2 (two) times daily. for 10 days 30 mL 0    chlorhexidine (PERIDEX) 0.12 % solution       clobetasoL (TEMOVATE) 0.05 % external solution Apply topically once daily. 50 mL 3    clobetasoL (TEMOVATE) 0.05 % external solution Apply topically once daily. 50 mL 2    clobetasoL (TEMOVATE) 0.05 % external solution Apply topically once daily. Apply to the scalp 50 mL 3    hydrocortisone (ANUSOL-HC) 2.5 % rectal cream Place rectally 2 (two) times daily. 28 g 1    magnesium oxide (MAG-OX) 400 mg (241.3 mg magnesium) tablet Take 1 tablet (400 mg total) by mouth once daily. 30 tablet 5    sertraline (ZOLOFT) 100 MG tablet Take 1 tablet (100 mg total) by mouth once daily. 90 tablet 3     sodium,potassium,mag sulfates (SUPREP BOWEL PREP KIT) 17.5-3.13-1.6 gram SolR Follow instructions given by Endoscopy scheduling nurse 2 kit 0    spironolactone (ALDACTONE) 100 MG tablet Take 1 tablet (100 mg total) by mouth once daily. 90 tablet 2    spironolactone (ALDACTONE) 100 MG tablet Take 1 tablet (100 mg total) by mouth once daily. 90 tablet 1    spironolactone (ALDACTONE) 50 MG tablet Take 1 tablet (50 mg total) by mouth 2 (two) times daily. 120 tablet 1    sumatriptan (IMITREX) 50 MG tablet Take 1 tablet (50 mg total) by mouth every 8 (eight) hours as needed for Migraine. 27 tablet 3    SYNTHROID 200 mcg tablet Take 1 tablet (200 mcg total) by mouth before breakfast. 90 tablet 3    triamcinolone acetonide 0.1% (KENALOG) 0.1 % ointment AAA bid 454 g 1    UBRELVY 100 mg tablet Take 1 tablet (100 mg total) by mouth once as needed for Migraine. Okay to take a 2nd tablet (another 100mg) in 2 hrs if mild relief. 16 tablet 5     No current facility-administered medications for this visit.       Medical History     Past Medical History:   Diagnosis Date    History of psychiatric care     History of psychiatric hospitalization     Hypothyroidism     Migraines     Psychiatric exam requested by authority     Psychiatric problem     Therapy      Past Surgical History:   Procedure Laterality Date    APPENDECTOMY       SECTION      COLONOSCOPY N/A 2023    Procedure: COLONOSCOPY;  Surgeon: Emily Fairbanks MD;  Location: [facility] ENDO;  Service: Gastroenterology;  Laterality: N/A;  constipation prep/ inst portal-RB    HYSTERECTOMY      HYSTEROSCOPY WITH DILATION AND CURETTAGE OF UTERUS N/A 2020    Procedure: HYSTEROSCOPY, WITH DILATION AND CURETTAGE OF UTERUS;  Surgeon: Nesha Skaggs Mai, MD;  Location: [facility] OR;  Service: OB/GYN;  Laterality: N/A;  RN PRE OP 6- COVID & T&S 2020. CA  H/P incomplete----UPT    LAPAROSCOPIC SALPINGECTOMY Bilateral 2021    Procedure: SALPINGECTOMY, LAPAROSCOPIC;   Surgeon: Nesha Skaggs Mai, MD;  Location: [facility] OR;  Service: OB/GYN;  Laterality: Bilateral;    LAPAROSCOPIC TOTAL HYSTERECTOMY N/A 1/26/2021    Procedure: HYSTERECTOMY, TOTAL, LAPAROSCOPIC;  Surgeon: Nesha Skaggs Mai, MD;  Location: [facility] OR;  Service: OB/GYN;  Laterality: N/A;  RN PREOP 1/25/21,-- COVID NEGATIVE ON 1/25-- T/S, and HCG quant done  with bilateral salpingectomy and cystoscopy  NEED CONSENTS    LASER ABLATION OF THE CERVIX       Family History   Problem Relation Age of Onset    Hypertension Mother     Hypertension Father     Stroke Father     Ovarian cancer Paternal Grandmother      Social History     Socioeconomic History    Marital status: Single    Number of children: 1   Occupational History    Occupation: Unemployed - substitute teaching   Tobacco Use    Smoking status: Never    Smokeless tobacco: Never   Substance and Sexual Activity    Alcohol use: No    Drug use: No    Sexual activity: Not Currently     Partners: Male       Exam     Vitals:    05/29/23 0657   BP: 119/77   Pulse: 68      Physical Exam:  General: She is not in acute distress. She is not ill-appearing.   HENT: Normocephalic and atraumatic. Moist mucous membranes.  Eyes: Conjunctivae normal.   Pulmonary: Pulmonary effort is normal.   Abdominal: Abdomen is soft and flat.   Skin: Skin is warm and dry. No rashes.   Psychiatric: Mood normal.        Neurologic Exam   Mental status: oriented to person, place, and time  Attention: Normal. Concentration: normal.  Speech: speech is normal.  Cranial Nerves: PERRL, EOMI intact, V1-V3 Facial sensation intact. Symmetric facies. Hearing grossly intact. Palate and uvula midline, symmetric. No tongue deviation. Trapezius strength intact.     Motor exam: bulk and tone normal. Strength 5/5 in bilateral upper extremities: deltoids, biceps, triceps, wrist flexion/extension, finger abduction/adduction. Strength 5/5 in bilateral lower extremities: hip flexion/extension, thigh adduction/abduction, knee  flexion/extension, dorsiflexion/plantarflexion, foot eversion/inversion.    Reflexes: 2+ in bilateral upper extremities: biceps and brachiaradialis, 2+ in bilateral lower extremities: patellar and achilles  Plantar reflex: normal  Conner's/Clonus: negative    Sensory exam: light touch intact    Gait exam: normal  Romberg: negative  Coordination: normal   Tremor: none    Labs and Imaging     Labs: reviewed  12/2022 TSH wnl    Imaging: reviewed  02/2020 MRI brain w wo contrast personally reviewed  - no acute intracranial processes    Assessment and Plan     Problem List Items Addressed This Visit          Neuro    Hemifacial spasm    Overview     From Bell's palsy         Migraine without aura, intractable, without status migrainosus - Primary    Relevant Medications    atogepant 60 mg Tab    magnesium oxide (MAG-OX) 400 mg (241.3 mg magnesium) tablet    UBRELVY 100 mg tablet     This is a 52 year old female w hx of menstrual migraines s/p partial hysterectomy. Following for intractable migraines that were daily, was on sumatriptan for abortive with minimal relief. For prevention - Patient sleeps well so elavil would not be a good option for her. Has failed topiramate.   Quilepta and magnesium 400mg daily for migraine prevention, ubrelvy for breakthrough. Patient to track how often she is taking ubrelvy (2 breakthroughs in the last month). Doing well.     For her hemifacial spasms, she gets botox every 3 months w movement disorder specialist.    Follow-up: Follow up in about 6 months (around 11/29/2023).

## 2023-06-02 ENCOUNTER — PROCEDURE VISIT (OUTPATIENT)
Dept: NEUROLOGY | Facility: CLINIC | Age: 53
End: 2023-06-02
Payer: COMMERCIAL

## 2023-06-02 VITALS
DIASTOLIC BLOOD PRESSURE: 84 MMHG | HEIGHT: 63 IN | BODY MASS INDEX: 29.26 KG/M2 | HEART RATE: 65 BPM | WEIGHT: 165.13 LBS | SYSTOLIC BLOOD PRESSURE: 118 MMHG

## 2023-06-02 DIAGNOSIS — G51.31 HEMIFACIAL SPASM OF RIGHT SIDE OF FACE: ICD-10-CM

## 2023-06-02 PROCEDURE — 64612 DESTROY NERVE FACE MUSCLE: CPT | Mod: RT,S$GLB,, | Performed by: PSYCHIATRY & NEUROLOGY

## 2023-06-02 PROCEDURE — 64612 PR DEST,NERVE,FACIAL: ICD-10-PCS | Mod: RT,S$GLB,, | Performed by: PSYCHIATRY & NEUROLOGY

## 2023-06-02 NOTE — ASSESSMENT & PLAN NOTE
Did well with botox for hemifacial spasm  Placed injection laterally around eye to prevent eyelid droop

## 2023-06-02 NOTE — PROCEDURES
"    MOVEMENT DISORDERS CLINIC     PCP/Referring Provider: Ilda Bailey MD  [id number] [place] [address]  Date of Service: 6/2/2023    Chief Complaint: twitching    Interval Hx    Since last visit,   Mild lateral eyedroop that then resolved  Botox has worn off and spasming returned  Continues to have facial pulling in her R eye and around R mouth  But much better covered  Botox lasts 3 mos  No overweakening around R mouth since omitting this injection  Here for botox      "priorHPI: Magalis Rosales is a R HANDED 52 y.o. female with a medical issues significant for hypothyroidism, migraines, who presents with R facial twitching since Summer 2019. She noted her R face and R eye would twitch. She notes also A R ear ringing (episodic) with L cheek pain around the same time. No obvious bell's palsy beforehand. No change to taste. She visited the ER for transient slurred speech September. MRI brain wwo normal and MRA normal. No change to her facial sensation. No trouble swallowing.   No weightloss noted.      Review of Systems:   Review of Systems   Constitutional:  Negative for fever.   HENT:  Negative for congestion.    Eyes:  Negative for double vision.   Respiratory:  Negative for cough and shortness of breath.    Cardiovascular:  Negative for chest pain and leg swelling.   Gastrointestinal:  Negative for nausea.   Genitourinary:  Negative for dysuria.   Musculoskeletal:  Negative for falls.   Skin:  Negative for rash.   Neurological:  Negative for tremors, speech change and headaches.   Psychiatric/Behavioral:  Negative for depression.      Neuroleptic exposure:  None    Current Medications:  Outpatient Encounter Medications as of 6/2/2023   Medication Sig Dispense Refill    atogepant 60 mg Tab Take 60 mg by mouth once daily. 30 tablet 5    chlorhexidine (PERIDEX) 0.12 % solution       clobetasoL (TEMOVATE) 0.05 % external solution Apply topically once daily. 50 mL 3    clobetasoL (TEMOVATE) 0.05 % " external solution Apply topically once daily. 50 mL 2    clobetasoL (TEMOVATE) 0.05 % external solution Apply topically once daily. Apply to the scalp 50 mL 3    hydrocortisone (ANUSOL-HC) 2.5 % rectal cream Place rectally 2 (two) times daily. 28 g 1    magnesium oxide (MAG-OX) 400 mg (241.3 mg magnesium) tablet Take 1 tablet (400 mg total) by mouth once daily. 30 tablet 5    sertraline (ZOLOFT) 100 MG tablet Take 1 tablet (100 mg total) by mouth once daily. 90 tablet 3    sodium,potassium,mag sulfates (SUPREP BOWEL PREP KIT) 17.5-3.13-1.6 gram SolR Follow instructions given by Endoscopy scheduling nurse 2 kit 0    spironolactone (ALDACTONE) 100 MG tablet Take 1 tablet (100 mg total) by mouth once daily. 90 tablet 2    spironolactone (ALDACTONE) 100 MG tablet Take 1 tablet (100 mg total) by mouth once daily. 90 tablet 1    spironolactone (ALDACTONE) 50 MG tablet Take 1 tablet (50 mg total) by mouth 2 (two) times daily. 120 tablet 1    sumatriptan (IMITREX) 50 MG tablet Take 1 tablet (50 mg total) by mouth every 8 (eight) hours as needed for Migraine. 27 tablet 3    SYNTHROID 200 mcg tablet Take 1 tablet (200 mcg total) by mouth before breakfast. 90 tablet 3    triamcinolone acetonide 0.1% (KENALOG) 0.1 % ointment AAA bid 454 g 1    azelastine (ASTELIN) 137 mcg (0.1 %) nasal spray 1 spray (137 mcg total) by Nasal route 2 (two) times daily. for 10 days 30 mL 0    [] UBRELVY 100 mg tablet Take 1 tablet (100 mg total) by mouth once as needed for Migraine. Okay to take a 2nd tablet (another 100mg) in 2 hrs if mild relief. 16 tablet 5    [DISCONTINUED] atogepant 60 mg Tab Take 60 mg by mouth once daily. 30 tablet 5    [DISCONTINUED] magnesium oxide (MAG-OX) 400 mg (241.3 mg magnesium) tablet Take 1 tablet (400 mg total) by mouth once daily. 30 tablet 5    [DISCONTINUED] UBRELVY 100 mg tablet Take by mouth.       Facility-Administered Encounter Medications as of 2023   Medication Dose Route Frequency Provider  Last Rate Last Admin    [COMPLETED] onabotulinumtoxina injection 100 Units  100 Units Intramuscular 1 time in Clinic/HOD Ilda Bailey MD   100 Units at 23 0915       Past Medical History:  Patient Active Problem List   Diagnosis    Uterine fibroid    Dysarthria    Acquired hypothyroidism    Migraine without status migrainosus, not intractable    Bell's palsy    Hemifacial spasm    Uterine leiomyoma    IMB (intermenstrual bleeding)    Preop examination    Preop testing    S/P laparoscopic hysterectomy    Migraine without aura, intractable, without status migrainosus       Past Surgical History:  Past Surgical History:   Procedure Laterality Date    APPENDECTOMY       SECTION      COLONOSCOPY N/A 2023    Procedure: COLONOSCOPY;  Surgeon: Emily Fairbanks MD;  Location: [facility] ENDO;  Service: Gastroenterology;  Laterality: N/A;  constipation prep/ inst portal-RB    HYSTERECTOMY      HYSTEROSCOPY WITH DILATION AND CURETTAGE OF UTERUS N/A 2020    Procedure: HYSTEROSCOPY, WITH DILATION AND CURETTAGE OF UTERUS;  Surgeon: Nesha Skaggs Mai, MD;  Location: [facility] OR;  Service: OB/GYN;  Laterality: N/A;  RN PRE OP 6- COVID & T&S 2020. CA  H/P incomplete----UPT    LAPAROSCOPIC SALPINGECTOMY Bilateral 2021    Procedure: SALPINGECTOMY, LAPAROSCOPIC;  Surgeon: Nesha Skaggs Mai, MD;  Location: [facility] OR;  Service: OB/GYN;  Laterality: Bilateral;    LAPAROSCOPIC TOTAL HYSTERECTOMY N/A 2021    Procedure: HYSTERECTOMY, TOTAL, LAPAROSCOPIC;  Surgeon: Nesha Skaggs Mai, MD;  Location: [facility] OR;  Service: OB/GYN;  Laterality: N/A;  RN PREOP 21,-- COVID NEGATIVE ON -- T/S, and HCG quant done  with bilateral salpingectomy and cystoscopy  NEED CONSENTS    LASER ABLATION OF THE CERVIX         Current Living Situation: home    Social:  Social History     Socioeconomic History    Marital status: Single    Number of children: 1   Occupational History    Occupation: Unemployed - substitute  "teaching   Tobacco Use    Smoking status: Never    Smokeless tobacco: Never   Substance and Sexual Activity    Alcohol use: No    Drug use: No    Sexual activity: Not Currently     Partners: Male       Family History:  Family History   Problem Relation Age of Onset    Hypertension Mother     Hypertension Father     Stroke Father     Ovarian cancer Paternal Grandmother        PHYSICAL:  /84   Pulse 65   Ht 5' 3" (1.6 m)   Wt 74.9 kg (165 lb 2 oz)   LMP 01/21/2021 (Approximate)   BMI 29.25 kg/m²     Focused exam  Mild HFS R in V1-2-3 - eye closue is more prominent    "General Medical Examination:  General: Good hygiene, appropriate appearance.  HEENT: Normocephalic, atraumatic.   Neck: Supple.   Chest: Unlabored breathing.   CV: Symmetric pulses.   Ext: No clubbing, cyanosis, or edema.     Mental Status:  Mood/Affect: Appropriate/congruent.  Level of consciousness: Awake, alert.  Orientation: Oriented to person, place, time and situation.  Language: No Dysarthria    Cranial nerves:  I: Not tested  II: PERRL, VFF to counting  III, IV, VI: EOMI with conjugate gaze and no nystagmus on end gaze  V: Facial sensation intact and symmetric over the bilateral V1-V3  VII: Facial muscle activation nl  VIII: Hearing intact in the b/l ears and symmetrical to finger rub  IX, X, XII: TUP midline - no atrophy or fasiculations  X: SCMs and shoulder shrug full strength b/l and symmetric      Motor:   -UE: 5/5 deltoids; 5/5 biceps, triceps; 5/5 wrist flexors, extensors; 5/5 interosseous; 5/5   -LEs: 5/5 hip flexion, extension; 5/5 knee flexion, extension; 5/5 ankle flexion, extension    DTRs:  ? Biceps Triceps Brachioradialis Knee Ankle   Left 2+ 2+ 2+ 2+ 2+   Right 2+ 2+ 2+ 2+ 2+     ? Finger taps Finger flicks ALMA Heel taps   Left 0 0 0 0   Right 0 0 0 0   Neck tone: 0  ? Arm Leg   Left 0 0   Right 0 0     Sensation:   -Light touch: Intact and symmetric in the bilateral upper and lower extremities.  -Temp: Intact and " "symmetric in the bilateral upper and lower extremities.  -Vibration: Intact and symmetric in the bilateral upper and lower extremities.  -Pin prick: Intact and symmetric in the bilateral upper and lower extremities.  -Proprioception: Intact and symmetric in the bilateral upper and lower extremities.    Coordination:   -Finger to nose: 0  -Heel to shin: 0    Gait:  -Arises from chair without use of hands.  -Casual gait is: nl based  -Stride length: nl"    Laboratory Data:  NA    Imaging:  No central lesions or peripheral lesions to explain bell's palsy  Spoke with neuro-radiology who corroborated no peripheral or central lesions    Procedure:   Patient was prepped in usual fashion with alcohol pads.     100 units of onobotulinum toxin was prepared  61 units were wasted.   Muscles and doses that were injected:     Patients signed consent forms and we answered all questions about risks and benefits of botox injections. They agreed to the procedure.  ? L R   ?orbicularis oculi superior lateral ? ?2   ?orbicularis oculi superior medial ? ?2   orbicularis oculi  lateral  2   ?orbicularis oculi inferior lateral ? ?2   orbicularis oculi inferior medial  2   Mentalis   5   rhizorus  5   Orbicularis oris  omitted   Above eybrow  omitted   masseter  5U   ?nasalis ? ?5U   The patient tolerated the procedure well and there were no immediate complications following the procedure.         Assessment//Plan:   Problem List Items Addressed This Visit          Neuro    Hemifacial spasm    Overview     From Bell's palsy         Current Assessment & Plan     Did well with botox for hemifacial spasm  Placed injection laterally around eye to prevent eyelid droop            Ilda Bailey MD, MS [facility]  Department of Neurology  Movement Disorders      Procedures    "

## 2023-06-09 DIAGNOSIS — L65.9 HAIR LOSS: ICD-10-CM

## 2023-06-16 RX ORDER — CLOBETASOL PROPIONATE 0.46 MG/ML
SOLUTION TOPICAL DAILY
Qty: 50 ML | Refills: 1 | Status: SHIPPED | OUTPATIENT
Start: 2023-06-16

## 2023-07-07 DIAGNOSIS — G51.31 HEMIFACIAL SPASM OF RIGHT SIDE OF FACE: Primary | ICD-10-CM

## 2023-08-28 ENCOUNTER — TELEPHONE (OUTPATIENT)
Dept: NEUROLOGY | Facility: CLINIC | Age: 53
End: 2023-08-28
Payer: COMMERCIAL

## 2023-08-28 NOTE — TELEPHONE ENCOUNTER
Called pt to r/s appt with Dr. Bailey on 09/15 since Dr. Bailey will not be in clinic on this date. Offered 09/12 at 9 AM. No answer, left vm.

## 2023-10-11 ENCOUNTER — PATIENT MESSAGE (OUTPATIENT)
Dept: NEUROLOGY | Facility: CLINIC | Age: 53
End: 2023-10-11
Payer: COMMERCIAL

## 2023-10-11 ENCOUNTER — TELEPHONE (OUTPATIENT)
Dept: NEUROLOGY | Facility: CLINIC | Age: 53
End: 2023-10-11
Payer: COMMERCIAL

## 2023-10-16 NOTE — PROGRESS NOTES
SUBJECTIVE:   Magalis Rosales is a 49 y.o. female   for annual well woman exam. Patient's last menstrual period was 2020 (exact date)..      She complains of IMB x 4-5 months.  Pt with known h/o uterine fibroids.  Never had problem before  Menses still regular and lasting 4-5 days,  Not heavy but now experienced IMB that lasted for 1 week for the past 4-5 months  Pt reported cramping during IMB but denies pain with menstrual cycles  She is not currently sexually active.    Pt is running out of insurance at the end of the month  Pt denies vasomotor symptoms    Contraception: none, not currently sexually active      Past Medical History:   Diagnosis Date    History of psychiatric care     History of psychiatric hospitalization     Hypothyroidism     Migraines     Psychiatric exam requested by authority     Psychiatric problem     Therapy      Past Surgical History:   Procedure Laterality Date    APPENDECTOMY       SECTION       Social History     Socioeconomic History    Marital status: Single     Spouse name: Not on file    Number of children: 1    Years of education: Not on file    Highest education level: Not on file   Occupational History    Occupation: Unemployed - substitute teaching   Social Needs    Financial resource strain: Not on file    Food insecurity:     Worry: Not on file     Inability: Not on file    Transportation needs:     Medical: Not on file     Non-medical: Not on file   Tobacco Use    Smoking status: Never Smoker    Smokeless tobacco: Never Used   Substance and Sexual Activity    Alcohol use: No    Drug use: No    Sexual activity: Not Currently     Partners: Male   Lifestyle    Physical activity:     Days per week: Not on file     Minutes per session: Not on file    Stress: Not on file   Relationships    Social connections:     Talks on phone: Not on file     Gets together: Not on file     Attends Confucianism service: Not on file     Active member of  club or organization: Not on file     Attends meetings of clubs or organizations: Not on file     Relationship status: Not on file   Other Topics Concern    Patient feels they ought to cut down on drinking/drug use Not Asked    Patient annoyed by others criticizing their drinking/drug use Not Asked    Patient has felt bad or guilty about drinking/drug use Not Asked    Patient has had a drink/used drugs as an eye opener in the AM Not Asked   Social History Narrative    Not on file     Family History   Problem Relation Age of Onset    Hypertension Mother     Hypertension Father     Stroke Father      OB History    Para Term  AB Living   1 1 1     1   SAB TAB Ectopic Multiple Live Births           1      # Outcome Date GA Lbr Ryley/2nd Weight Sex Delivery Anes PTL Lv   1 Term               Obstetric Comments   Gynhx;  Reg   Not currently sexually active   Uterine fibroid x years - no symptoms   H/o abnormal pap, s/p laser in , normal pap since then   MMG 2018 NEG         Current Outpatient Medications   Medication Sig Dispense Refill    chlorhexidine (PERIDEX) 0.12 % solution       fluocinonide (LIDEX) 0.05 % ointment Apply topically 2 (two) times daily. 15 g 0    gabapentin (NEURONTIN) 100 MG capsule Take 1 capsule (100 mg total) by mouth 3 (three) times daily. 90 capsule 11     mg tablet       levothyroxine (SYNTHROID) 200 MCG tablet Take 1 tablet (200 mcg total) by mouth once daily. 30 tablet 11    magnesium oxide (MAG-OX) 400 mg (241.3 mg magnesium) tablet Take 1 tablet (400 mg total) by mouth once daily. 90 tablet 3    sertraline (ZOLOFT) 100 MG tablet Take 1 tablet (100 mg total) by mouth once daily. 90 tablet 1    sumatriptan (IMITREX) 50 MG tablet TAKE 1 TABLET BY MOUTH AT ONSET OF HEADACHE, MAY REPEAT IN 2 HOURS. NOT MORE THAN 2 IN 24 HOURS 21 tablet 1    topiramate (TOPAMAX) 100 MG tablet TAKE ONE TABLET BY MOUTH TWICE DAILY 60 tablet 2    triamcinolone acetonide 0.1%  "(KENALOG) 0.1 % ointment Apply topically 2 (two) times daily. 30 g 0     No current facility-administered medications for this visit.      Allergies: Patient has no known allergies.       ROS:  GENERAL: Denies weight gain or weight loss. Feeling well overall.   SKIN: Denies rash or lesions.   HEAD: Denies head injury or headache.   NODES: Denies enlarged lymph nodes.   CHEST: Denies chest pain or shortness of breath.   CARDIOVASCULAR: Denies palpitations or left sided chest pain.   ABDOMEN: No abdominal pain, constipation, diarrhea, nausea, vomiting or rectal bleeding.   URINARY: No frequency, dysuria, hematuria, or burning on urination.  REPRODUCTIVE: Denies vaginal discharge, abnormal vaginal bleeding, lesions, pelvic pain  BREASTS: The patient performs breast self-examination and denies pain, lumps, or nipple discharge.   HEMATOLOGIC: No easy bruisability or excessive bleeding.  MUSCULOSKELETAL: Denies joint pain or swelling.   NEUROLOGIC: Denies syncope or weakness.   PSYCHIATRIC: Denies depression, anxiety or mood swings.      OBJECTIVE:   /80 (BP Location: Left arm, Patient Position: Sitting, BP Method: Medium (Manual))   Ht 5' 3" (1.6 m)   Wt 72.5 kg (159 lb 13.3 oz)   LMP 04/30/2020 (Exact Date)   BMI 28.31 kg/m²   The patient appears well, alert, oriented x 3, in no distress.  PSYCH:  Normal, full range of affect  NECK: negative, no thyromegaly, trachea midline  SKIN: normal, good color, good turgor and no acne, striae, hirsutism  BREAST EXAM: breasts appear normal, no suspicious masses, no skin or nipple changes or axillary nodes  ABDOMEN: soft, non-tender; bowel sounds normal; no masses,  no organomegaly and no hernias, masses, or hepatosplenomegaly  GENITALIA: normal external genitalia, no erythema, no discharge  BLADDER: soft  URETHRA: normal appearing urethra with no masses, tenderness or lesions and normal urethra, normal urethral meatus  VAGINA: Normal  CERVIX: no lesions or cervical motion " tenderness and cervical canal is pinpoint, not able to pass a pap smear brush.  Cervix being displaced posteriorly and to left side  UTERUS: enlarged and bulky.  12-14 weeks size  ADNEXA: no mass, fullness, tenderness    ASSESSMENT:  1. Health maintenance  -pap done. Discussed ASCCP guideline screening every 3 - 5 years.   -screening MMG ordered  -counseled on exercise and healthy diet  -bone health:  Discussed Vitamin D and Calcium supplementation, weight bearing exercises  2.  Discussed safety at home/school/work, healthy and balanced diet, sleep hygiene, as well as violence/weapons exposure.   3.  IMB:  Will check labs and pelvic US.  Discussed with pt she will need EMB. However I do not think she is a candidate for in office EMB given her cervical stenosis and cervix displaced due to fibroids, likely will need h'scope D&C  4. Discussed alternatives of hysterectomy for her fibroid include:  -medical treatment:  Oral contraception, IUD, depo provera, contraceptive patch, implant  -surgical treatment:  UAE/UFA, myomectomy  -definitive treatment:  Hysterectomy  -pt is not interested in hysterectomy.  Discussed with pt due to IMB - endometrial sampling is needed for further evaluation.  Will try to do h'scope before the end of the month     Female

## 2023-10-20 DIAGNOSIS — G43.019 MIGRAINE WITHOUT AURA, INTRACTABLE, WITHOUT STATUS MIGRAINOSUS: ICD-10-CM

## 2023-11-14 ENCOUNTER — OFFICE VISIT (OUTPATIENT)
Dept: NEUROLOGY | Facility: CLINIC | Age: 53
End: 2023-11-14
Payer: COMMERCIAL

## 2023-11-14 VITALS
WEIGHT: 150.56 LBS | HEART RATE: 76 BPM | DIASTOLIC BLOOD PRESSURE: 79 MMHG | SYSTOLIC BLOOD PRESSURE: 137 MMHG | HEIGHT: 63 IN | BODY MASS INDEX: 26.68 KG/M2

## 2023-11-14 DIAGNOSIS — G43.019 MIGRAINE WITHOUT AURA, INTRACTABLE, WITHOUT STATUS MIGRAINOSUS: Primary | ICD-10-CM

## 2023-11-14 DIAGNOSIS — G51.31 HEMIFACIAL SPASM OF RIGHT SIDE OF FACE: ICD-10-CM

## 2023-11-14 PROCEDURE — 3078F PR MOST RECENT DIASTOLIC BLOOD PRESSURE < 80 MM HG: ICD-10-PCS | Mod: CPTII,S$GLB,, | Performed by: STUDENT IN AN ORGANIZED HEALTH CARE EDUCATION/TRAINING PROGRAM

## 2023-11-14 PROCEDURE — 1159F MED LIST DOCD IN RCRD: CPT | Mod: CPTII,S$GLB,, | Performed by: STUDENT IN AN ORGANIZED HEALTH CARE EDUCATION/TRAINING PROGRAM

## 2023-11-14 PROCEDURE — 3075F SYST BP GE 130 - 139MM HG: CPT | Mod: CPTII,S$GLB,, | Performed by: STUDENT IN AN ORGANIZED HEALTH CARE EDUCATION/TRAINING PROGRAM

## 2023-11-14 PROCEDURE — 99999 PR PBB SHADOW E&M-EST. PATIENT-LVL III: CPT | Mod: PBBFAC,,, | Performed by: STUDENT IN AN ORGANIZED HEALTH CARE EDUCATION/TRAINING PROGRAM

## 2023-11-14 PROCEDURE — 1159F PR MEDICATION LIST DOCUMENTED IN MEDICAL RECORD: ICD-10-PCS | Mod: CPTII,S$GLB,, | Performed by: STUDENT IN AN ORGANIZED HEALTH CARE EDUCATION/TRAINING PROGRAM

## 2023-11-14 PROCEDURE — 3008F BODY MASS INDEX DOCD: CPT | Mod: CPTII,S$GLB,, | Performed by: STUDENT IN AN ORGANIZED HEALTH CARE EDUCATION/TRAINING PROGRAM

## 2023-11-14 PROCEDURE — 99999 PR PBB SHADOW E&M-EST. PATIENT-LVL III: ICD-10-PCS | Mod: PBBFAC,,, | Performed by: STUDENT IN AN ORGANIZED HEALTH CARE EDUCATION/TRAINING PROGRAM

## 2023-11-14 PROCEDURE — 3075F PR MOST RECENT SYSTOLIC BLOOD PRESS GE 130-139MM HG: ICD-10-PCS | Mod: CPTII,S$GLB,, | Performed by: STUDENT IN AN ORGANIZED HEALTH CARE EDUCATION/TRAINING PROGRAM

## 2023-11-14 PROCEDURE — 99214 OFFICE O/P EST MOD 30 MIN: CPT | Mod: S$GLB,,, | Performed by: STUDENT IN AN ORGANIZED HEALTH CARE EDUCATION/TRAINING PROGRAM

## 2023-11-14 PROCEDURE — 3078F DIAST BP <80 MM HG: CPT | Mod: CPTII,S$GLB,, | Performed by: STUDENT IN AN ORGANIZED HEALTH CARE EDUCATION/TRAINING PROGRAM

## 2023-11-14 PROCEDURE — 3008F PR BODY MASS INDEX (BMI) DOCUMENTED: ICD-10-PCS | Mod: CPTII,S$GLB,, | Performed by: STUDENT IN AN ORGANIZED HEALTH CARE EDUCATION/TRAINING PROGRAM

## 2023-11-14 PROCEDURE — 99214 PR OFFICE/OUTPT VISIT, EST, LEVL IV, 30-39 MIN: ICD-10-PCS | Mod: S$GLB,,, | Performed by: STUDENT IN AN ORGANIZED HEALTH CARE EDUCATION/TRAINING PROGRAM

## 2023-11-14 RX ORDER — UBROGEPANT 100 MG/1
100 TABLET ORAL
Qty: 16 TABLET | Refills: 5 | Status: SHIPPED | OUTPATIENT
Start: 2023-11-14

## 2023-11-14 NOTE — PROGRESS NOTES
Chief Complaint and Duration     Chief Complaint   Patient presents with    Migraine   Since 2007, worsened recently    History of Present Illness     Magalis Rosales is a 52 y.o. R handed female with a history of multiple medical diagnoses as listed below that presents for evaluation and treatment of headache. She does have a headache at this time.    Started in 2007, gradually worse, no inciting event. Was related to menstrual cycle prior, has had partial hysterectomy. Now, happening every day.     Hx of strokes in family (aunt had aneurysm, dad has had strokes)    Medications tried - topiramate (failed), tramadol (stopped)  Abortives - imitrex currently    Sleeps well at night.     Interim period:  5/29/2023  Had 2 breakthrough migraines in the past month, no known triggers. Used 2 ubrelvy in past month. No changes in characteristics of migraines. Good sleep. No recent illnesses.    11/14/23  Doing well, 3 headaches this past month, much improved.    Description of Headaches:  Location of pain: frontal or temporal or apical  Radiation of pain?: radiates occipitally  Character of pain: stabbing  Severity of pain: can get up to 10/10  Accompanying symptoms: photosensitivity, phonophobia, nausea  Prodromal sx?: none  Rapidity of onset: gradual  Typical duration of individual headache: if take medication, works within 30 minutes. Or can last all day  Are most headaches similar in presentation? yes  Typical precipitants: no significant precipitants    Temporal Pattern of Headaches:  Started having HA's since 2007  Worst time of day: afternoon evening  Awaken from sleep?: yes - the tension builds  Seasonal pattern?: no  Clustering of HA's over time? 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 no  History of head/neck surgery? no  Family h/o headache  problems? no  Use of meds that might worsen HA's (nitrates, exogenous estrogens,    Nifedipine)? no  Exposure to carbon monoxide? no  Substance use: no    Review of Systems: (positive bolded)  Constitutional: Negative for fatigue, activity change, fevers, or chills.   HENT:  Negative for new visual disturbances or difficulty swallowing.    Respiratory:  Negative for shortness of breath.    Cardiovascular:  Negative for palpitations.   Gastrointestinal:  Negative for constipation, nausea, or vomiting.   Endocrine: Negative for temperature instability/intolerance.   Genitourinary:  Negative for difficulty urinating.   Musculoskeletal:  Negative for neck pain, back pain, myalgias, joint swelling, or gait problem.  Skin:  Negative for rash or lesions.   Neurological:  Negative for seizures, headaches, dizziness, tremors, syncope, speech difficulty, weakness, light-headedness, or numbness.   Hematological:  Does not bruise/bleed easily.   Psychiatric/Behavioral: Negative for decreased concentration or sleep disturbance.    Review of patient's allergies indicates:  No Known Allergies  Current Outpatient Medications   Medication Sig Dispense Refill    chlorhexidine (PERIDEX) 0.12 % solution       clobetasoL (TEMOVATE) 0.05 % external solution Apply topically once daily. 50 mL 3    spironolactone (ALDACTONE) 100 MG tablet Take 1 tablet (100 mg total) by mouth once daily. 90 tablet 1    spironolactone (ALDACTONE) 50 MG tablet Take 1 tablet (50 mg total) by mouth 2 (two) times daily. 120 tablet 1    SYNTHROID 200 mcg tablet Take 1 tablet (200 mcg total) by mouth before breakfast. 90 tablet 3    triamcinolone acetonide 0.1% (KENALOG) 0.1 % ointment AAA bid 454 g 1    atogepant 60 mg Tab Take 1 tablet (60 mg) by mouth once daily. . Followup on 11/14 for further erfills 30 tablet 5    azelastine (ASTELIN) 137 mcg (0.1 %) nasal spray 1 spray (137 mcg total) by Nasal route 2 (two) times daily. for 10 days 30 mL 0    clobetasoL  (TEMOVATE) 0.05 % external solution Apply topically once daily. 50 mL 2    clobetasoL (TEMOVATE) 0.05 % external solution Apply topically once daily. Apply to the scalp 50 mL 1    hydrocortisone (ANUSOL-HC) 2.5 % rectal cream Place rectally 2 (two) times daily. (Patient not taking: Reported on 2023) 28 g 1    sertraline (ZOLOFT) 100 MG tablet Take 1 tablet (100 mg total) by mouth once daily. 90 tablet 3    sodium,potassium,mag sulfates (SUPREP BOWEL PREP KIT) 17.5-3.13-1.6 gram SolR Follow instructions given by Endoscopy scheduling nurse 2 kit 0    spironolactone (ALDACTONE) 100 MG tablet Take 1 tablet (100 mg total) by mouth once daily. 90 tablet 2    ubrogepant (UBRELVY) 100 mg tablet Take 1 tablet (100 mg total) by mouth as needed for Migraine. Take 2nd tablet (another 100mg) by mouth in 2 hrs, no more than 2 in 24 hrs 16 tablet 5     No current facility-administered medications for this visit.       Medical History     Past Medical History:   Diagnosis Date    History of psychiatric care     History of psychiatric hospitalization     Hypothyroidism     Migraines     Psychiatric exam requested by authority     Psychiatric problem     Therapy      Past Surgical History:   Procedure Laterality Date    APPENDECTOMY       SECTION      COLONOSCOPY N/A 2023    Procedure: COLONOSCOPY;  Surgeon: Emily Fairbanks MD;  Location: [facility];  Service: Gastroenterology;  Laterality: N/A;  constipation prep/ inst portal-RB    HYSTERECTOMY      HYSTEROSCOPY WITH DILATION AND CURETTAGE OF UTERUS N/A 2020    Procedure: HYSTEROSCOPY, WITH DILATION AND CURETTAGE OF UTERUS;  Surgeon: Nesha Skaggs Mai, MD;  Location: [facility] OR;  Service: OB/GYN;  Laterality: N/A;  RN PRE OP 6- COVID & T&S 2020. CA  H/P incomplete----UPT    LAPAROSCOPIC SALPINGECTOMY Bilateral 2021    Procedure: SALPINGECTOMY, LAPAROSCOPIC;  Surgeon: Nesha Skaggs Mai, MD;  Location: [facility] OR;  Service: OB/GYN;  Laterality:  Bilateral;    LAPAROSCOPIC TOTAL HYSTERECTOMY N/A 1/26/2021    Procedure: HYSTERECTOMY, TOTAL, LAPAROSCOPIC;  Surgeon: Nesha Skaggs Mai, MD;  Location: [facility] OR;  Service: OB/GYN;  Laterality: N/A;  RN PREOP 1/25/21,-- COVID NEGATIVE ON 1/25-- T/S, and HCG quant done  with bilateral salpingectomy and cystoscopy  NEED CONSENTS    LASER ABLATION OF THE CERVIX       Family History   Problem Relation Age of Onset    Hypertension Mother     Hypertension Father     Stroke Father     Ovarian cancer Paternal Grandmother      Social History     Socioeconomic History    Marital status: Single    Number of children: 1   Occupational History    Occupation: Unemployed - substitute teaching   Tobacco Use    Smoking status: Never    Smokeless tobacco: Never   Substance and Sexual Activity    Alcohol use: No    Drug use: No    Sexual activity: Not Currently     Partners: Male       Exam     Vitals:    11/14/23 0705   BP: 137/79   Pulse: 76      Physical Exam:  General: She is not in acute distress. She is not ill-appearing.   HENT: Normocephalic and atraumatic. Moist mucous membranes.  Eyes: Conjunctivae normal.   Pulmonary: Pulmonary effort is normal.   Abdominal: Abdomen is soft and flat.   Skin: Skin is warm and dry. No rashes.   Psychiatric: Mood normal.        Neurologic Exam   Mental status: oriented to person, place, and time  Attention: Normal. Concentration: normal.  Speech: speech is normal.  Cranial Nerves: PERRL, EOMI intact, V1-V3 Facial sensation intact. Symmetric facies. Hearing grossly intact. Palate and uvula midline, symmetric. No tongue deviation. Trapezius strength intact.     Motor exam: bulk and tone normal. Strength 5/5 in bilateral upper extremities: deltoids, biceps, triceps, wrist flexion/extension, finger abduction/adduction. Strength 5/5 in bilateral lower extremities: hip flexion/extension, thigh adduction/abduction, knee flexion/extension, dorsiflexion/plantarflexion, foot  eversion/inversion.    Reflexes: 2+ in bilateral upper extremities: biceps and brachiaradialis, 2+ in bilateral lower extremities: patellar and achilles  Plantar reflex: normal  Conner's/Clonus: negative    Sensory exam: light touch intact    Gait exam: normal  Romberg: negative  Coordination: normal   Tremor: none    Labs and Imaging     Labs: reviewed  12/2022 TSH wnl    Imaging: reviewed  02/2020 MRI brain w wo contrast personally reviewed  - no acute intracranial processes    Assessment and Plan     Problem List Items Addressed This Visit          Neuro    Hemifacial spasm    Overview     From Bell's palsy         Migraine without aura, intractable, without status migrainosus - Primary    Relevant Medications    ubrogepant (UBRELVY) 100 mg tablet    atogepant 60 mg Tab       This is a 52 year old female w hx of menstrual migraines s/p partial hysterectomy. Following for intractable migraines that were daily, was on sumatriptan for abortive with minimal relief. For prevention - Patient sleeps well so elavil would not be a good option for her. Has failed topiramate.   Quilepta and magnesium 400mg daily for migraine prevention, ubrelvy for breakthrough. Patient to track how often she is taking ubrelvy (3 breakthroughs in the last month). Doing well.       For her hemifacial spasms, she gets botox every 3 months w movement disorder specialist.  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    Follow-up: Follow up in about 7 months (around 6/14/2024).

## 2023-12-14 ENCOUNTER — TELEPHONE (OUTPATIENT)
Dept: FAMILY MEDICINE | Facility: CLINIC | Age: 53
End: 2023-12-14
Payer: COMMERCIAL

## 2023-12-14 NOTE — TELEPHONE ENCOUNTER
No answer. Left message for Patient to return call to clinic to reschedule appt provider out of the office.

## 2023-12-22 ENCOUNTER — OFFICE VISIT (OUTPATIENT)
Dept: OBSTETRICS AND GYNECOLOGY | Facility: CLINIC | Age: 53
End: 2023-12-22
Payer: COMMERCIAL

## 2023-12-22 VITALS
BODY MASS INDEX: 25.19 KG/M2 | SYSTOLIC BLOOD PRESSURE: 108 MMHG | DIASTOLIC BLOOD PRESSURE: 80 MMHG | WEIGHT: 142.19 LBS

## 2023-12-22 DIAGNOSIS — Z01.419 WELL WOMAN EXAM WITH ROUTINE GYNECOLOGICAL EXAM: Primary | ICD-10-CM

## 2023-12-22 DIAGNOSIS — Z12.31 BREAST CANCER SCREENING BY MAMMOGRAM: ICD-10-CM

## 2023-12-22 PROCEDURE — 3074F SYST BP LT 130 MM HG: CPT | Mod: CPTII,S$GLB,, | Performed by: OBSTETRICS & GYNECOLOGY

## 2023-12-22 PROCEDURE — 3074F PR MOST RECENT SYSTOLIC BLOOD PRESSURE < 130 MM HG: ICD-10-PCS | Mod: CPTII,S$GLB,, | Performed by: OBSTETRICS & GYNECOLOGY

## 2023-12-22 PROCEDURE — 99396 PREV VISIT EST AGE 40-64: CPT | Mod: S$GLB,,, | Performed by: OBSTETRICS & GYNECOLOGY

## 2023-12-22 PROCEDURE — 99999 PR PBB SHADOW E&M-EST. PATIENT-LVL II: ICD-10-PCS | Mod: PBBFAC,,, | Performed by: OBSTETRICS & GYNECOLOGY

## 2023-12-22 PROCEDURE — 3008F BODY MASS INDEX DOCD: CPT | Mod: CPTII,S$GLB,, | Performed by: OBSTETRICS & GYNECOLOGY

## 2023-12-22 PROCEDURE — 3079F DIAST BP 80-89 MM HG: CPT | Mod: CPTII,S$GLB,, | Performed by: OBSTETRICS & GYNECOLOGY

## 2023-12-22 PROCEDURE — 3079F PR MOST RECENT DIASTOLIC BLOOD PRESSURE 80-89 MM HG: ICD-10-PCS | Mod: CPTII,S$GLB,, | Performed by: OBSTETRICS & GYNECOLOGY

## 2023-12-22 PROCEDURE — 99999 PR PBB SHADOW E&M-EST. PATIENT-LVL II: CPT | Mod: PBBFAC,,, | Performed by: OBSTETRICS & GYNECOLOGY

## 2023-12-22 PROCEDURE — 99396 PR PREVENTIVE VISIT,EST,40-64: ICD-10-PCS | Mod: S$GLB,,, | Performed by: OBSTETRICS & GYNECOLOGY

## 2023-12-22 PROCEDURE — 3008F PR BODY MASS INDEX (BMI) DOCUMENTED: ICD-10-PCS | Mod: CPTII,S$GLB,, | Performed by: OBSTETRICS & GYNECOLOGY

## 2023-12-22 NOTE — PROGRESS NOTES
SUBJECTIVE:   Magalis Rosales is a 53 y.o. female   for annual well woman exam. Patient's last menstrual period was 2021 (approximate)..    s/p TLH/BS on 21 for symptomatic uterine fibroids.    She is doing well, no complaints    Past Medical History:   Diagnosis Date    History of psychiatric care     History of psychiatric hospitalization     Hypothyroidism     Migraines     Psychiatric exam requested by authority     Psychiatric problem     Therapy      Past Surgical History:   Procedure Laterality Date    APPENDECTOMY       SECTION      COLONOSCOPY N/A 2023    Procedure: COLONOSCOPY;  Surgeon: Emily Fairbanks MD;  Location: [facility] ENDO;  Service: Gastroenterology;  Laterality: N/A;  constipation prep/ inst portal-RB    HYSTERECTOMY      HYSTEROSCOPY WITH DILATION AND CURETTAGE OF UTERUS N/A 2020    Procedure: HYSTEROSCOPY, WITH DILATION AND CURETTAGE OF UTERUS;  Surgeon: Nesha Skaggs Mai, MD;  Location: [facility] OR;  Service: OB/GYN;  Laterality: N/A;  RN PRE OP 6- COVID & T&S 2020. [place]  H/P incomplete----UPT    LAPAROSCOPIC SALPINGECTOMY Bilateral 2021    Procedure: SALPINGECTOMY, LAPAROSCOPIC;  Surgeon: Nesha Skaggs Mai, MD;  Location: [facility] OR;  Service: OB/GYN;  Laterality: Bilateral;    LAPAROSCOPIC TOTAL HYSTERECTOMY N/A 2021    Procedure: HYSTERECTOMY, TOTAL, LAPAROSCOPIC;  Surgeon: Nesha Skaggs Mai, MD;  Location: [facility] OR;  Service: OB/GYN;  Laterality: N/A;  RN PREOP 21,-- COVID NEGATIVE ON -- T/S, and HCG quant done  with bilateral salpingectomy and cystoscopy  NEED CONSENTS    LASER ABLATION OF THE CERVIX       Social History     Socioeconomic History    Marital status: Single    Number of children: 1   Occupational History    Occupation: Unemployed - substitute teaching   Tobacco Use    Smoking status: Never    Smokeless tobacco: Never   Substance and Sexual Activity    Alcohol use: No    Drug use: No    Sexual activity: Not Currently      Partners: Male     Family History   Problem Relation Age of Onset    Hypertension Mother     Hypertension Father     Stroke Father     Ovarian cancer Paternal Grandmother      OB History    Para Term  AB Living   1 1 1     1   SAB IAB Ectopic Multiple Live Births           1      # Outcome Date GA Lbr Ryley/2nd Weight Sex Delivery Anes PTL Lv   1 Term    3.572 kg (7 lb 14 oz) F CS-Unspec         Birth Comments: OP position      Obstetric Comments   Gynhx;  Reg   S/p TLH/BS for fibroids 2021   H/o abnormal pap, s/p laser in , normal pap since then   Pap 2020 NEG/other HR HPV positive   MMG  NEG         Current Outpatient Medications   Medication Sig Dispense Refill    atogepant 60 mg Tab Take 1 tablet (60 mg) by mouth once daily. . Followup on  for further erfills 30 tablet 5    azelastine (ASTELIN) 137 mcg (0.1 %) nasal spray 1 spray (137 mcg total) by Nasal route 2 (two) times daily. for 10 days 30 mL 0    chlorhexidine (PERIDEX) 0.12 % solution       clobetasoL (TEMOVATE) 0.05 % external solution Apply topically once daily. 50 mL 3    clobetasoL (TEMOVATE) 0.05 % external solution Apply topically once daily. 50 mL 2    clobetasoL (TEMOVATE) 0.05 % external solution Apply topically once daily. Apply to the scalp 50 mL 1    hydrocortisone (ANUSOL-HC) 2.5 % rectal cream Place rectally 2 (two) times daily. (Patient not taking: Reported on 2023) 28 g 1    sertraline (ZOLOFT) 100 MG tablet Take 1 tablet (100 mg total) by mouth once daily. 90 tablet 3    sodium,potassium,mag sulfates (SUPREP BOWEL PREP KIT) 17.5-3.13-1.6 gram SolR Follow instructions given by Endoscopy scheduling nurse 2 kit 0    spironolactone (ALDACTONE) 100 MG tablet Take 1 tablet (100 mg total) by mouth once daily. 90 tablet 1    spironolactone (ALDACTONE) 50 MG tablet Take 1 tablet (50 mg total) by mouth 2 (two) times daily. 120 tablet 1    SYNTHROID 200 mcg tablet Take 1 tablet (200 mcg total) by mouth before  breakfast. 90 tablet 3    triamcinolone acetonide 0.1% (KENALOG) 0.1 % ointment AAA bid 454 g 1    ubrogepant (UBRELVY) 100 mg tablet Take 1 tablet (100 mg total) by mouth as needed for Migraine. Take 2nd tablet (another 100mg) by mouth in 2 hrs, no more than 2 in 24 hrs 16 tablet 5     No current facility-administered medications for this visit.     Allergies: Patient has no known allergies.       ROS:  GENERAL: Denies weight gain or weight loss. Feeling well overall.   SKIN: Denies rash or lesions.   HEAD: Denies head injury or headache.   NODES: Denies enlarged lymph nodes.   CHEST: Denies chest pain or shortness of breath.   CARDIOVASCULAR: Denies palpitations or left sided chest pain.   ABDOMEN: No abdominal pain, constipation, diarrhea, nausea, vomiting or rectal bleeding.   URINARY: No frequency, dysuria, hematuria, or burning on urination.  REPRODUCTIVE: Denies vaginal discharge, abnormal vaginal bleeding, lesions, pelvic pain  BREASTS: The patient performs breast self-examination and denies pain, lumps, or nipple discharge.   HEMATOLOGIC: No easy bruisability or excessive bleeding.  MUSCULOSKELETAL: Denies joint pain or swelling.   NEUROLOGIC: Denies syncope or weakness.   PSYCHIATRIC: Denies depression, anxiety or mood swings.      OBJECTIVE:   /80   Wt 64.5 kg (142 lb 3.2 oz)   LMP 01/21/2021 (Approximate)   BMI 25.19 kg/m²   The patient appears well, alert, oriented x 3, in no distress.  PSYCH:  Normal, full range of affect  NECK: negative, no thyromegaly, trachea midline  SKIN: normal, good color, good turgor and no acne, striae, hirsutism  BREAST EXAM: breasts appear normal, no suspicious masses, no skin or nipple changes or axillary nodes  ABDOMEN: soft, non-tender; bowel sounds normal; no masses,  no organomegaly and no hernias, masses, or hepatosplenomegaly  GENITALIA: normal external genitalia, no erythema, no discharge  BLADDER: soft  URETHRA: normal appearing urethra with no masses,  tenderness or lesions and normal urethra, normal urethral meatus  VAGINA: Normal  CERVIX: absent  UTERUS: absent  ADNEXA: no mass, fullness, tenderness      ASSESSMENT:   1. Health maintenance  -pap not indicated  -screening MMG ordered  -counseled on exercise and healthy diet,   -bone health:  Discussed Vitamin D and Calcium supplementation, weight bearing exercises  2.  Discussed safety at home/school/work, healthy and balanced diet, sleep hygiene, as well as violence/weapons exposure.   3.  RTC  every 1 -2 years

## 2023-12-26 ENCOUNTER — TELEPHONE (OUTPATIENT)
Dept: FAMILY MEDICINE | Facility: CLINIC | Age: 53
End: 2023-12-26
Payer: COMMERCIAL

## 2023-12-27 ENCOUNTER — LAB VISIT (OUTPATIENT)
Dept: LAB | Facility: HOSPITAL | Age: 53
End: 2023-12-27
Attending: FAMILY MEDICINE
Payer: COMMERCIAL

## 2023-12-27 ENCOUNTER — OFFICE VISIT (OUTPATIENT)
Dept: FAMILY MEDICINE | Facility: CLINIC | Age: 53
End: 2023-12-27
Payer: COMMERCIAL

## 2023-12-27 VITALS
WEIGHT: 142.63 LBS | SYSTOLIC BLOOD PRESSURE: 100 MMHG | DIASTOLIC BLOOD PRESSURE: 62 MMHG | TEMPERATURE: 98 F | HEIGHT: 63 IN | BODY MASS INDEX: 25.27 KG/M2

## 2023-12-27 DIAGNOSIS — G43.019 MIGRAINE WITHOUT AURA, INTRACTABLE, WITHOUT STATUS MIGRAINOSUS: ICD-10-CM

## 2023-12-27 DIAGNOSIS — Z12.11 COLON CANCER SCREENING: ICD-10-CM

## 2023-12-27 DIAGNOSIS — E03.9 ACQUIRED HYPOTHYROIDISM: ICD-10-CM

## 2023-12-27 DIAGNOSIS — Z00.00 WELL WOMAN EXAM WITHOUT GYNECOLOGICAL EXAM: ICD-10-CM

## 2023-12-27 DIAGNOSIS — G51.31 HEMIFACIAL SPASM OF RIGHT SIDE OF FACE: ICD-10-CM

## 2023-12-27 DIAGNOSIS — Z00.00 WELL WOMAN EXAM WITHOUT GYNECOLOGICAL EXAM: Primary | ICD-10-CM

## 2023-12-27 PROBLEM — R47.1 DYSARTHRIA: Status: RESOLVED | Noted: 2019-09-17 | Resolved: 2023-12-27

## 2023-12-27 PROBLEM — Z01.818 PREOP EXAMINATION: Status: RESOLVED | Noted: 2021-01-26 | Resolved: 2023-12-27

## 2023-12-27 PROBLEM — Z01.818 PREOP TESTING: Status: RESOLVED | Noted: 2021-01-26 | Resolved: 2023-12-27

## 2023-12-27 LAB
ALBUMIN SERPL BCP-MCNC: 3.4 G/DL (ref 3.5–5.2)
ALP SERPL-CCNC: 63 U/L (ref 55–135)
ALT SERPL W/O P-5'-P-CCNC: 11 U/L (ref 10–44)
ANION GAP SERPL CALC-SCNC: 12 MMOL/L (ref 8–16)
AST SERPL-CCNC: 17 U/L (ref 10–40)
BASOPHILS # BLD AUTO: 0.03 K/UL (ref 0–0.2)
BASOPHILS NFR BLD: 0.4 % (ref 0–1.9)
BILIRUB SERPL-MCNC: 0.5 MG/DL (ref 0.1–1)
BUN SERPL-MCNC: 13 MG/DL (ref 6–20)
CALCIUM SERPL-MCNC: 9 MG/DL (ref 8.7–10.5)
CHLORIDE SERPL-SCNC: 105 MMOL/L (ref 95–110)
CHOLEST SERPL-MCNC: 183 MG/DL (ref 120–199)
CHOLEST/HDLC SERPL: 3.7 {RATIO} (ref 2–5)
CO2 SERPL-SCNC: 22 MMOL/L (ref 23–29)
CREAT SERPL-MCNC: 0.7 MG/DL (ref 0.5–1.4)
DIFFERENTIAL METHOD: NORMAL
EOSINOPHIL # BLD AUTO: 0 K/UL (ref 0–0.5)
EOSINOPHIL NFR BLD: 0.5 % (ref 0–8)
ERYTHROCYTE [DISTWIDTH] IN BLOOD BY AUTOMATED COUNT: 13 % (ref 11.5–14.5)
EST. GFR  (NO RACE VARIABLE): >60 ML/MIN/1.73 M^2
GLUCOSE SERPL-MCNC: 85 MG/DL (ref 70–110)
HCT VFR BLD AUTO: 39.5 % (ref 37–48.5)
HDLC SERPL-MCNC: 49 MG/DL (ref 40–75)
HDLC SERPL: 26.8 % (ref 20–50)
HGB BLD-MCNC: 13.2 G/DL (ref 12–16)
IMM GRANULOCYTES # BLD AUTO: 0.01 K/UL (ref 0–0.04)
IMM GRANULOCYTES NFR BLD AUTO: 0.1 % (ref 0–0.5)
LDLC SERPL CALC-MCNC: 115 MG/DL (ref 63–159)
LYMPHOCYTES # BLD AUTO: 1.8 K/UL (ref 1–4.8)
LYMPHOCYTES NFR BLD: 22 % (ref 18–48)
MCH RBC QN AUTO: 28.2 PG (ref 27–31)
MCHC RBC AUTO-ENTMCNC: 33.4 G/DL (ref 32–36)
MCV RBC AUTO: 84 FL (ref 82–98)
MONOCYTES # BLD AUTO: 0.4 K/UL (ref 0.3–1)
MONOCYTES NFR BLD: 5 % (ref 4–15)
NEUTROPHILS # BLD AUTO: 6 K/UL (ref 1.8–7.7)
NEUTROPHILS NFR BLD: 72 % (ref 38–73)
NONHDLC SERPL-MCNC: 134 MG/DL
NRBC BLD-RTO: 0 /100 WBC
PLATELET # BLD AUTO: 236 K/UL (ref 150–450)
PMV BLD AUTO: 12.2 FL (ref 9.2–12.9)
POTASSIUM SERPL-SCNC: 3.9 MMOL/L (ref 3.5–5.1)
PROT SERPL-MCNC: 7 G/DL (ref 6–8.4)
RBC # BLD AUTO: 4.68 M/UL (ref 4–5.4)
SODIUM SERPL-SCNC: 139 MMOL/L (ref 136–145)
T4 FREE SERPL-MCNC: 1.39 NG/DL (ref 0.71–1.51)
TRIGL SERPL-MCNC: 95 MG/DL (ref 30–150)
TSH SERPL DL<=0.005 MIU/L-ACNC: 0.01 UIU/ML (ref 0.4–4)
WBC # BLD AUTO: 8.37 K/UL (ref 3.9–12.7)

## 2023-12-27 PROCEDURE — 80061 LIPID PANEL: CPT | Performed by: FAMILY MEDICINE

## 2023-12-27 PROCEDURE — 36415 COLL VENOUS BLD VENIPUNCTURE: CPT | Mod: PO | Performed by: FAMILY MEDICINE

## 2023-12-27 PROCEDURE — 1160F PR REVIEW ALL MEDS BY PRESCRIBER/CLIN PHARMACIST DOCUMENTED: ICD-10-PCS | Mod: CPTII,S$GLB,, | Performed by: FAMILY MEDICINE

## 2023-12-27 PROCEDURE — 1159F MED LIST DOCD IN RCRD: CPT | Mod: CPTII,S$GLB,, | Performed by: FAMILY MEDICINE

## 2023-12-27 PROCEDURE — 1159F PR MEDICATION LIST DOCUMENTED IN MEDICAL RECORD: ICD-10-PCS | Mod: CPTII,S$GLB,, | Performed by: FAMILY MEDICINE

## 2023-12-27 PROCEDURE — 3078F DIAST BP <80 MM HG: CPT | Mod: CPTII,S$GLB,, | Performed by: FAMILY MEDICINE

## 2023-12-27 PROCEDURE — 1160F RVW MEDS BY RX/DR IN RCRD: CPT | Mod: CPTII,S$GLB,, | Performed by: FAMILY MEDICINE

## 2023-12-27 PROCEDURE — 3074F PR MOST RECENT SYSTOLIC BLOOD PRESSURE < 130 MM HG: ICD-10-PCS | Mod: CPTII,S$GLB,, | Performed by: FAMILY MEDICINE

## 2023-12-27 PROCEDURE — 99396 PR PREVENTIVE VISIT,EST,40-64: ICD-10-PCS | Mod: S$GLB,,, | Performed by: FAMILY MEDICINE

## 2023-12-27 PROCEDURE — 99396 PREV VISIT EST AGE 40-64: CPT | Mod: S$GLB,,, | Performed by: FAMILY MEDICINE

## 2023-12-27 PROCEDURE — 80053 COMPREHEN METABOLIC PANEL: CPT | Performed by: FAMILY MEDICINE

## 2023-12-27 PROCEDURE — 99999 PR PBB SHADOW E&M-EST. PATIENT-LVL III: CPT | Mod: PBBFAC,,, | Performed by: FAMILY MEDICINE

## 2023-12-27 PROCEDURE — 3008F BODY MASS INDEX DOCD: CPT | Mod: CPTII,S$GLB,, | Performed by: FAMILY MEDICINE

## 2023-12-27 PROCEDURE — 3078F PR MOST RECENT DIASTOLIC BLOOD PRESSURE < 80 MM HG: ICD-10-PCS | Mod: CPTII,S$GLB,, | Performed by: FAMILY MEDICINE

## 2023-12-27 PROCEDURE — 85025 COMPLETE CBC W/AUTO DIFF WBC: CPT | Performed by: FAMILY MEDICINE

## 2023-12-27 PROCEDURE — 3008F PR BODY MASS INDEX (BMI) DOCUMENTED: ICD-10-PCS | Mod: CPTII,S$GLB,, | Performed by: FAMILY MEDICINE

## 2023-12-27 PROCEDURE — 84439 ASSAY OF FREE THYROXINE: CPT | Performed by: FAMILY MEDICINE

## 2023-12-27 PROCEDURE — 84443 ASSAY THYROID STIM HORMONE: CPT | Performed by: FAMILY MEDICINE

## 2023-12-27 PROCEDURE — 99999 PR PBB SHADOW E&M-EST. PATIENT-LVL III: ICD-10-PCS | Mod: PBBFAC,,, | Performed by: FAMILY MEDICINE

## 2023-12-27 PROCEDURE — 3074F SYST BP LT 130 MM HG: CPT | Mod: CPTII,S$GLB,, | Performed by: FAMILY MEDICINE

## 2023-12-27 NOTE — PROGRESS NOTES
"Well Woman VISIT      CHIEF COMPLAINT  Chief Complaint   Patient presents with    Annual Exam       HPI  Magalis Rosales is a 53 y.o. female who presents for wellness.     Social Factors  Tobacco use: No  Ready to Quit: No  Alcohol: No  Intimate partner violence screening  Regular Exercise: No    Depression  Over the past two weeks, have you felt down, depressed, or hopeless? No  Over the past two weeks, have you felt little interest or pleasure in doing things? No    Reproductive Health  Followed by OBGYN    CHD, HTN, DM2  CHD Risk Factors: none  Women 45 years and older should be screened for dyslipidemia if at increased risk of CHD  Women 20 to 45 years of age should be screened for dyslipidemia if at increased risk of CHD  Asymptomatic adults with sustained blood pressure greater than 135/80 mm Hg (treated or untreated) should be screened for type 2 diabetes mellitus    Estimated body mass index is 25.27 kg/m² as calculated from the following:    Height as of this encounter: 5' 3" (1.6 m).    Weight as of this encounter: 64.7 kg (142 lb 10.2 oz).      Screening  Mammogram needed: utd  Colonoscopy needed: cologuard ordered  Osteoporosis screen needed: n/a     Women 50 to 74 years of age should be screened for breast cancer with mammography biennially.  Women should be screened for cervical cancer with Pap tests beginning at 21 years of age. Low-risk women should receive Pap testing every three years. Co-testing for human papillomavirus is an option beginning at 30 years of age, and can extend the screening interval to five years. Cervical cancer screening should be discontinued at 65 years of age or after total hysterectomy if the woman has a benign gynecologic history  Adults 50 to 75 years of age should be screened for colorectal cancer with an FOBT annually, sigmoidoscopy every five years with an FOBT every three years, or colonoscopy every 10 years.  Women 65 years and older should be screened for " "osteoporosis. Women younger than 65 years should be screened if the risk of fracture is greater than or equal to that of a 65-year-old white woman without additional risk factors.      Immunizations  delayed    ALLERGIES and MEDS were verified.   PMHx, PSHx, FHx, SOCIALHx were updated as pertinent.    REVIEW OF SYSTEMS  Review of Systems   Constitutional: Negative.    HENT: Negative.     Eyes: Negative.    Respiratory: Negative.     Cardiovascular: Negative.    Gastrointestinal: Negative.    Genitourinary: Negative.    Musculoskeletal: Negative.    Skin: Negative.    Neurological:  Positive for headaches.         PHYSICAL EXAM  VITAL SIGNS: /62   Temp 97.9 °F (36.6 °C) (Oral)   Ht 5' 3" (1.6 m)   Wt 64.7 kg (142 lb 10.2 oz)   LMP 01/21/2021 (Approximate)   BMI 25.27 kg/m²   GEN: Well developed, Well nourished, No acute distress.  HENT: Normocephalic, Atraumatic, Bilateral external ears normal, Nose normal, Oropharynx moist, No oral exudates.   Eyes: PERRL EOMI, Conjunctiva normal, No discharge.   Neck: Supple, No tenderness.  Lymphatic: No cervical or supraclavicular lymphadenopathy noted.   Cardiovascular: Normal heart rate, Normal rhythm, No murmurs, No rubs, No gallops.   Thorax & Lungs: Normal breath sounds, No respiratory distress, No wheezing.        ASSESSMENT/PLAN    Magalis was seen today for annual exam.    Diagnoses and all orders for this visit:    Well woman exam without gynecological exam  -     CBC Auto Differential; Future  -     Comprehensive Metabolic Panel; Future  -     Lipid Panel; Future  -     TSH; Future  -     Urinalysis; Future  - Labs to be done fasting    Acquired hypothyroidism  -     TSH; Future  - Will check tsh level  - Syntrhoid refilled prior to visit  - Will adjust if labs abnormal    Migraine without aura, intractable, without status migrainosus  -     Comprehensive Metabolic Panel; Future  -     TSH; Future  - Stable with current medication regimen  - Scheduled for " follow up with neurology in the next month    Hemifacial spasm of right side of face  - Resolved at this time  - Was receiving injections, but cost made her have to stop    Colon cancer screening  -     Cologuard Screening (Multitarget Stool DNA); Future  -     Cologuard Screening (Multitarget Stool DNA)  - Colon cancer screening due  - Patient declines colonoscopy, but agrees to do cologuard  - Patient aware she will be called prior to the cologuard being sent out  - She will have to have a diagnostic colonoscopy should the cologuard be positive           FOLLOW UP: 1 year or sooner if needed      Neto Stewart MD

## 2024-01-02 ENCOUNTER — PATIENT MESSAGE (OUTPATIENT)
Dept: FAMILY MEDICINE | Facility: CLINIC | Age: 54
End: 2024-01-02
Payer: COMMERCIAL

## 2024-01-02 DIAGNOSIS — E03.9 ACQUIRED HYPOTHYROIDISM: Primary | ICD-10-CM

## 2024-01-02 RX ORDER — LEVOTHYROXINE SODIUM 175 UG/1
175 TABLET ORAL
Qty: 30 TABLET | Refills: 1 | Status: SHIPPED | OUTPATIENT
Start: 2024-01-02 | End: 2025-01-01

## 2024-01-18 ENCOUNTER — PATIENT MESSAGE (OUTPATIENT)
Dept: FAMILY MEDICINE | Facility: CLINIC | Age: 54
End: 2024-01-18
Payer: COMMERCIAL

## 2024-01-21 LAB — NONINV COLON CA DNA+OCC BLD SCRN STL QL: NEGATIVE

## 2024-02-12 ENCOUNTER — HOSPITAL ENCOUNTER (OUTPATIENT)
Dept: RADIOLOGY | Facility: HOSPITAL | Age: 54
Discharge: HOME | End: 2024-02-12
Attending: OBSTETRICS & GYNECOLOGY
Payer: COMMERCIAL

## 2024-02-12 DIAGNOSIS — Z12.31 BREAST CANCER SCREENING BY MAMMOGRAM: ICD-10-CM

## 2024-02-12 PROCEDURE — 77063 BREAST TOMOSYNTHESIS BI: CPT | Mod: 26,,, | Performed by: RADIOLOGY

## 2024-02-12 PROCEDURE — 77067 SCR MAMMO BI INCL CAD: CPT | Mod: TC

## 2024-02-12 PROCEDURE — 77067 SCR MAMMO BI INCL CAD: CPT | Mod: 26,,, | Performed by: RADIOLOGY

## 2024-03-02 DIAGNOSIS — E03.9 ACQUIRED HYPOTHYROIDISM: ICD-10-CM

## 2024-03-02 NOTE — TELEPHONE ENCOUNTER
No care due was identified.  [facility] Embedded Care Due Messages. Reference number: [id number].   3/02/2024 3:58:53 AM CST

## 2024-03-04 RX ORDER — LEVOTHYROXINE SODIUM 175 UG/1
175 TABLET ORAL
Qty: 30 TABLET | Refills: 1 | Status: SHIPPED | OUTPATIENT
Start: 2024-03-04 | End: 2024-05-27

## 2024-03-04 NOTE — TELEPHONE ENCOUNTER
Left voicemail to call back office.  Dr. Womack's message: Please schedule TSH within next two weeks

## 2024-05-03 ENCOUNTER — PATIENT MESSAGE (OUTPATIENT)
Dept: FAMILY MEDICINE | Facility: CLINIC | Age: 54
End: 2024-05-03
Payer: COMMERCIAL

## 2024-05-27 DIAGNOSIS — E03.9 ACQUIRED HYPOTHYROIDISM: ICD-10-CM

## 2024-05-27 RX ORDER — LEVOTHYROXINE SODIUM 175 UG/1
175 TABLET ORAL
Qty: 30 TABLET | Refills: 5 | Status: SHIPPED | OUTPATIENT
Start: 2024-05-27 | End: 2024-05-28 | Stop reason: DRUGHIGH

## 2024-05-27 NOTE — TELEPHONE ENCOUNTER
Care Due:                  Date            Visit Type   Department     Provider  --------------------------------------------------------------------------------                                MYCHART                              ANNUAL       [facility]                              CHECKUP/PHY  MEDICINE/  Last Visit: 12-      S            INTERNAL MED   Brock Womack                               -         [facility]                              PRIMARY      MEDICINE/  Next Visit: 12-      CARE ([facility])   INTERNAL MED   Brock Womack                                                            Last  Test          Frequency    Reason                     Performed    Due Date  --------------------------------------------------------------------------------    Office Visit  15 months..  levothyroxine............  12- 03-    [facility] Embedded Care Due Messages. Reference number: [id number].   5/27/2024 4:00:15 AM CDT

## 2024-05-28 ENCOUNTER — PATIENT MESSAGE (OUTPATIENT)
Dept: WOUND CARE | Facility: HOSPITAL | Age: 54
End: 2024-05-28
Payer: COMMERCIAL

## 2024-05-28 ENCOUNTER — PATIENT OUTREACH (OUTPATIENT)
Dept: ADMINISTRATIVE | Facility: HOSPITAL | Age: 54
End: 2024-05-28
Payer: COMMERCIAL

## 2024-05-28 ENCOUNTER — LAB VISIT (OUTPATIENT)
Dept: LAB | Facility: HOSPITAL | Age: 54
End: 2024-05-28
Attending: INTERNAL MEDICINE
Payer: COMMERCIAL

## 2024-05-28 ENCOUNTER — PATIENT MESSAGE (OUTPATIENT)
Dept: FAMILY MEDICINE | Facility: CLINIC | Age: 54
End: 2024-05-28
Payer: COMMERCIAL

## 2024-05-28 DIAGNOSIS — E03.9 ACQUIRED HYPOTHYROIDISM: ICD-10-CM

## 2024-05-28 DIAGNOSIS — R73.03 PREDIABETES: ICD-10-CM

## 2024-05-28 DIAGNOSIS — R73.03 PREDIABETES: Primary | ICD-10-CM

## 2024-05-28 DIAGNOSIS — E03.9 ACQUIRED HYPOTHYROIDISM: Primary | ICD-10-CM

## 2024-05-28 LAB
ESTIMATED AVG GLUCOSE: 94 MG/DL (ref 68–131)
HBA1C MFR BLD: 4.9 % (ref 4–5.6)
T4 FREE SERPL-MCNC: 1.45 NG/DL (ref 0.71–1.51)
TSH SERPL DL<=0.005 MIU/L-ACNC: 0.01 UIU/ML (ref 0.4–4)

## 2024-05-28 PROCEDURE — 83036 HEMOGLOBIN GLYCOSYLATED A1C: CPT | Performed by: INTERNAL MEDICINE

## 2024-05-28 PROCEDURE — 84443 ASSAY THYROID STIM HORMONE: CPT | Performed by: INTERNAL MEDICINE

## 2024-05-28 PROCEDURE — 36415 COLL VENOUS BLD VENIPUNCTURE: CPT | Mod: PN | Performed by: INTERNAL MEDICINE

## 2024-05-28 PROCEDURE — 84439 ASSAY OF FREE THYROXINE: CPT | Performed by: INTERNAL MEDICINE

## 2024-05-28 RX ORDER — LEVOTHYROXINE SODIUM 150 UG/1
150 TABLET ORAL
Qty: 30 TABLET | Refills: 1 | Status: SHIPPED | OUTPATIENT
Start: 2024-05-28 | End: 2024-05-29 | Stop reason: SDUPTHER

## 2024-05-29 ENCOUNTER — PATIENT MESSAGE (OUTPATIENT)
Dept: FAMILY MEDICINE | Facility: CLINIC | Age: 54
End: 2024-05-29
Payer: COMMERCIAL

## 2024-05-29 DIAGNOSIS — E03.9 ACQUIRED HYPOTHYROIDISM: ICD-10-CM

## 2024-05-29 RX ORDER — LEVOTHYROXINE SODIUM 150 UG/1
150 TABLET ORAL
Qty: 30 TABLET | Refills: 1 | Status: SHIPPED | OUTPATIENT
Start: 2024-05-29 | End: 2025-05-29

## 2024-05-29 NOTE — TELEPHONE ENCOUNTER
No care due was identified.  [facility] Embedded Care Due Messages. Reference number: [id number].   5/29/2024 2:00:27 PM CDT

## 2024-08-01 DIAGNOSIS — E03.9 ACQUIRED HYPOTHYROIDISM: ICD-10-CM

## 2024-08-02 RX ORDER — LEVOTHYROXINE SODIUM 150 UG/1
150 TABLET ORAL
Qty: 30 TABLET | Refills: 0 | Status: SHIPPED | OUTPATIENT
Start: 2024-08-02 | End: 2025-08-02

## 2024-08-02 NOTE — TELEPHONE ENCOUNTER
Care Due:                  Date            Visit Type   Department     Provider  --------------------------------------------------------------------------------                                MYCHART                              ANNUAL       [facility]                              CHECKUP/PHY  MEDICINE/  Last Visit: 12-      S            INTERNAL MED   Brock Womack                               -         [facility]                              PRIMARY      MEDICINE/  Next Visit: 12-      CARE ([facility])   INTERNAL MED   Brock Womack                                                            Last  Test          Frequency    Reason                     Performed    Due Date  --------------------------------------------------------------------------------    Office Visit  15 months..  levothyroxine............  12- 03-    Health [facility] Embedded Care Due Messages. Reference number: [id number].   8/01/2024 7:54:29 PM CDT

## 2024-08-05 ENCOUNTER — LAB VISIT (OUTPATIENT)
Dept: LAB | Facility: HOSPITAL | Age: 54
End: 2024-08-05
Attending: INTERNAL MEDICINE
Payer: COMMERCIAL

## 2024-08-05 DIAGNOSIS — E03.9 ACQUIRED HYPOTHYROIDISM: ICD-10-CM

## 2024-08-05 LAB
T4 FREE SERPL-MCNC: 1.47 NG/DL (ref 0.71–1.51)
TSH SERPL DL<=0.005 MIU/L-ACNC: 0.08 UIU/ML (ref 0.4–4)

## 2024-08-05 PROCEDURE — 84439 ASSAY OF FREE THYROXINE: CPT | Performed by: INTERNAL MEDICINE

## 2024-08-05 PROCEDURE — 84443 ASSAY THYROID STIM HORMONE: CPT | Performed by: INTERNAL MEDICINE

## 2024-08-05 PROCEDURE — 36415 COLL VENOUS BLD VENIPUNCTURE: CPT | Performed by: INTERNAL MEDICINE

## 2024-08-27 DIAGNOSIS — G43.019 MIGRAINE WITHOUT AURA, INTRACTABLE, WITHOUT STATUS MIGRAINOSUS: ICD-10-CM

## 2024-08-27 DIAGNOSIS — E03.9 ACQUIRED HYPOTHYROIDISM: ICD-10-CM

## 2024-08-27 RX ORDER — ATOGEPANT 60 MG/1
60 TABLET ORAL DAILY
Qty: 30 TABLET | Refills: 5 | OUTPATIENT
Start: 2024-08-27 | End: 2025-02-23

## 2024-08-27 NOTE — TELEPHONE ENCOUNTER
No care due was identified.  Health [facility] Embedded Care Due Messages. Reference number: [id number].   8/27/2024 11:11:20 AM CDT

## 2024-08-28 RX ORDER — LEVOTHYROXINE SODIUM 137 UG/1
137 TABLET ORAL
Qty: 30 TABLET | Refills: 0 | Status: SHIPPED | OUTPATIENT
Start: 2024-08-28 | End: 2025-08-28

## 2024-09-02 DIAGNOSIS — G43.019 MIGRAINE WITHOUT AURA, INTRACTABLE, WITHOUT STATUS MIGRAINOSUS: ICD-10-CM

## 2024-09-03 ENCOUNTER — PATIENT MESSAGE (OUTPATIENT)
Dept: NEUROLOGY | Facility: CLINIC | Age: 54
End: 2024-09-03
Payer: COMMERCIAL

## 2024-09-03 RX ORDER — ATOGEPANT 60 MG/1
60 TABLET ORAL DAILY
Qty: 30 TABLET | Refills: 5 | OUTPATIENT
Start: 2024-09-03 | End: 2025-03-02

## 2024-10-08 ENCOUNTER — TELEPHONE (OUTPATIENT)
Dept: NEUROLOGY | Facility: CLINIC | Age: 54
End: 2024-10-08
Payer: COMMERCIAL

## 2024-10-08 ENCOUNTER — OFFICE VISIT (OUTPATIENT)
Dept: NEUROLOGY | Facility: CLINIC | Age: 54
End: 2024-10-08
Payer: COMMERCIAL

## 2024-10-08 VITALS
TEMPERATURE: 98 F | DIASTOLIC BLOOD PRESSURE: 76 MMHG | WEIGHT: 125.25 LBS | SYSTOLIC BLOOD PRESSURE: 112 MMHG | HEIGHT: 63 IN | BODY MASS INDEX: 22.19 KG/M2 | RESPIRATION RATE: 18 BRPM | HEART RATE: 70 BPM | OXYGEN SATURATION: 100 %

## 2024-10-08 DIAGNOSIS — G43.019 MIGRAINE WITHOUT AURA, INTRACTABLE, WITHOUT STATUS MIGRAINOSUS: ICD-10-CM

## 2024-10-08 DIAGNOSIS — G51.31 HEMIFACIAL SPASM OF RIGHT SIDE OF FACE: ICD-10-CM

## 2024-10-08 DIAGNOSIS — E03.9 ACQUIRED HYPOTHYROIDISM: ICD-10-CM

## 2024-10-08 DIAGNOSIS — G43.019 MIGRAINE WITHOUT AURA, INTRACTABLE, WITHOUT STATUS MIGRAINOSUS: Primary | ICD-10-CM

## 2024-10-08 PROCEDURE — 1159F MED LIST DOCD IN RCRD: CPT | Mod: CPTII,S$GLB,, | Performed by: STUDENT IN AN ORGANIZED HEALTH CARE EDUCATION/TRAINING PROGRAM

## 2024-10-08 PROCEDURE — 3078F DIAST BP <80 MM HG: CPT | Mod: CPTII,S$GLB,, | Performed by: STUDENT IN AN ORGANIZED HEALTH CARE EDUCATION/TRAINING PROGRAM

## 2024-10-08 PROCEDURE — 3074F SYST BP LT 130 MM HG: CPT | Mod: CPTII,S$GLB,, | Performed by: STUDENT IN AN ORGANIZED HEALTH CARE EDUCATION/TRAINING PROGRAM

## 2024-10-08 PROCEDURE — 99214 OFFICE O/P EST MOD 30 MIN: CPT | Mod: S$GLB,,, | Performed by: STUDENT IN AN ORGANIZED HEALTH CARE EDUCATION/TRAINING PROGRAM

## 2024-10-08 PROCEDURE — 99999 PR PBB SHADOW E&M-EST. PATIENT-LVL III: CPT | Mod: PBBFAC,,, | Performed by: STUDENT IN AN ORGANIZED HEALTH CARE EDUCATION/TRAINING PROGRAM

## 2024-10-08 PROCEDURE — 3044F HG A1C LEVEL LT 7.0%: CPT | Mod: CPTII,S$GLB,, | Performed by: STUDENT IN AN ORGANIZED HEALTH CARE EDUCATION/TRAINING PROGRAM

## 2024-10-08 PROCEDURE — 3008F BODY MASS INDEX DOCD: CPT | Mod: CPTII,S$GLB,, | Performed by: STUDENT IN AN ORGANIZED HEALTH CARE EDUCATION/TRAINING PROGRAM

## 2024-10-08 RX ORDER — UBROGEPANT 100 MG/1
100 TABLET ORAL
Qty: 16 TABLET | Refills: 11 | Status: SHIPPED | OUTPATIENT
Start: 2024-10-08

## 2024-10-08 RX ORDER — DIGITAL THERAPEUTIC,REN DEVICE
1 MISCELLANEOUS MISCELLANEOUS
Qty: 1 EACH | Refills: 12 | Status: SHIPPED | OUTPATIENT
Start: 2024-10-08 | End: 2024-10-08 | Stop reason: SDUPTHER

## 2024-10-08 RX ORDER — ATOGEPANT 60 MG/1
60 TABLET ORAL DAILY
Qty: 30 TABLET | Refills: 11 | Status: SHIPPED | OUTPATIENT
Start: 2024-10-08 | End: 2024-10-09 | Stop reason: SDUPTHER

## 2024-10-08 NOTE — PROGRESS NOTES
Chief Complaint and Duration     Chief Complaint   Patient presents with    Follow-up     History of Migraines   Since 2007, here for followup    History of Present Illness     Magalis Rosales is a 53 y.o. R handed female with a history of multiple medical diagnoses as listed below that presents for evaluation and treatment of headache. She does have a headache at this time.    Started in 2007, gradually worse, no inciting event. Was related to menstrual cycle prior, has had partial hysterectomy. Now, happening every day.     Hx of strokes in family (aunt had aneurysm, dad has had strokes)    Medications tried - topiramate (failed), tramadol (stopped)  Abortives - imitrex currently    Sleeps well at night.     Interim period:  5/29/2023  Had 2 breakthrough migraines in the past month, no known triggers. Used 2 ubrelvy in past month. No changes in characteristics of migraines. Good sleep. No recent illnesses.    11/14/23  Doing well, 3 headaches this past month, much improved.    10/8/24 -   Doing well. Ran out of quilepta, has been using it sparingly. Responds to ubrelvy. No change in characteristics. No recent illnesses.       Description of Headaches:  Location of pain: frontal or temporal or apical  Radiation of pain?: radiates occipitally  Character of pain: stabbing  Severity of pain: can get up to 10/10  Accompanying symptoms: photosensitivity, phonophobia, nausea  Prodromal sx?: none  Rapidity of onset: gradual  Typical duration of individual headache: if take medication, works within 30 minutes. Or can last all day  Are most headaches similar in presentation? yes  Typical precipitants: no significant precipitants    Temporal Pattern of Headaches:  Started having HA's since 2007  Worst time of day: afternoon evening  Awaken from sleep?: yes - the tension builds  Seasonal pattern?: no  Clustering of HA's over time? 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 no  History of head/neck surgery? no  Family h/o headache problems? no  Use of meds that might worsen HA's (nitrates, exogenous estrogens,    Nifedipine)? no  Exposure to carbon monoxide? no  Substance use: no    Review of Systems: (positive bolded)  Constitutional: Negative for fatigue, activity change, fevers, or chills.   HENT:  Negative for new visual disturbances or difficulty swallowing.    Respiratory:  Negative for shortness of breath.    Cardiovascular:  Negative for palpitations.   Gastrointestinal:  Negative for constipation, nausea, or vomiting.   Endocrine: Negative for temperature instability/intolerance.   Genitourinary:  Negative for difficulty urinating.   Musculoskeletal:  Negative for neck pain, back pain, myalgias, joint swelling, or gait problem.  Skin:  Negative for rash or lesions.   Neurological:  Negative for seizures, headaches, dizziness, tremors, syncope, speech difficulty, weakness, light-headedness, or numbness.   Hematological:  Does not bruise/bleed easily.   Psychiatric/Behavioral: Negative for decreased concentration or sleep disturbance.    Review of patient's allergies indicates:  No Known Allergies  Current Outpatient Medications   Medication Sig Dispense Refill    clobetasoL (TEMOVATE) 0.05 % external solution Apply topically once daily. 50 mL 3    levothyroxine (SYNTHROID) 137 MCG Tab tablet Take 1 tablet (137 mcg total) by mouth before breakfast. 30 tablet 0    triamcinolone acetonide 0.1% (KENALOG) 0.1 % ointment AAA bid 454 g 1    atogepant (QULIPTA) 60 mg Tab Take 1 tablet (60 mg total) by mouth once daily. 30 tablet 11    azelastine (ASTELIN) 137 mcg (0.1 %) nasal spray 1 spray (137 mcg total) by Nasal route 2 (two) times daily. for 10 days 30 mL 0    chlorhexidine (PERIDEX) 0.12 % solution  (Patient not taking: Reported on 10/8/2024)      clobetasoL  (TEMOVATE) 0.05 % external solution Apply topically once daily. 50 mL 2    clobetasoL (TEMOVATE) 0.05 % external solution Apply topically once daily. Apply to the scalp (Patient not taking: Reported on 10/8/2024) 50 mL 1    digital therapeutic,UMA device (NERIVIO DIGITAL DENISE, MIGRAINE,) Misc 1 Units by Misc.(Non-Drug; Combo Route) route as needed (for migraine abortive relief). 1 each 12    hydrocortisone (ANUSOL-HC) 2.5 % rectal cream Place rectally 2 (two) times daily. 28 g 1    spironolactone (ALDACTONE) 50 MG tablet Take 1 tablet (50 mg total) by mouth 2 (two) times daily. 120 tablet 1    ubrogepant (UBRELVY) 100 mg tablet Take 1 tablet (100 mg total) by mouth as needed for Migraine. Take 2nd tablet (another 100mg) by mouth in 2 hrs, no more than 2 in 24 hrs 16 tablet 11     No current facility-administered medications for this visit.       Medical History     Past Medical History:   Diagnosis Date    History of psychiatric care     History of psychiatric hospitalization     Hypothyroidism     Migraines     Psychiatric exam requested by [facility]     Psychiatric problem     Therapy      Past Surgical History:   Procedure Laterality Date    APPENDECTOMY       SECTION      COLONOSCOPY N/A 2023    Procedure: COLONOSCOPY;  Surgeon: Emily Fairbanks MD;  Location: [facility];  Service: Gastroenterology;  Laterality: N/A;  constipation prep/ inst portal-RB    HYSTERECTOMY      HYSTEROSCOPY WITH DILATION AND CURETTAGE OF UTERUS N/A 2020    Procedure: HYSTEROSCOPY, WITH DILATION AND CURETTAGE OF UTERUS;  Surgeon: Nesha Skaggs Mai, MD;  Location: [facility] OR;  Service: OB/GYN;  Laterality: N/A;  RN PRE OP 6- COVID & T&S 2020. CA  H/P incomplete----UPT    LAPAROSCOPIC SALPINGECTOMY Bilateral 2021    Procedure: SALPINGECTOMY, LAPAROSCOPIC;  Surgeon: Nesha Skaggs Mai, MD;  Location: [facility] OR;  Service: OB/GYN;  Laterality: Bilateral;    LAPAROSCOPIC TOTAL HYSTERECTOMY N/A 2021    Procedure:  HYSTERECTOMY, TOTAL, LAPAROSCOPIC;  Surgeon: Nesha Skaggs Mai, MD;  Location: [facility];  Service: OB/GYN;  Laterality: N/A;  RN PREOP 1/25/21,-- COVID NEGATIVE ON 1/25-- T/S, and HCG quant done  with bilateral salpingectomy and cystoscopy  NEED CONSENTS    LASER ABLATION OF THE CERVIX       Family History   Problem Relation Name Age of Onset    Hypertension Mother      Hypertension Father      Stroke Father      Ovarian cancer Paternal Grandmother       Social History     Socioeconomic History    Marital status: Single    Number of children: 1   Occupational History    Occupation: Unemployed - substitute teaching   Tobacco Use    Smoking status: Never     Passive exposure: Never    Smokeless tobacco: Never   Substance and Sexual Activity    Alcohol use: No    Drug use: No    Sexual activity: Not Currently     Partners: Male     Social Drivers of Health     Financial Resource Strain: Low Risk  (12/25/2023)    Overall Financial Resource Strain (CARDIA)     Difficulty of Paying Living Expenses: Not hard at all   Food Insecurity: No Food Insecurity (12/25/2023)    Hunger Vital Sign     Worried About Running Out of Food in the Last Year: Never true     Ran Out of Food in the Last Year: Never true   Transportation Needs: No Transportation Needs (12/25/2023)    PRAPARE - Transportation     Lack of Transportation (Medical): No     Lack of Transportation (Non-Medical): No   Physical Activity: Insufficiently Active (12/25/2023)    Exercise Vital Sign     Days of Exercise per Week: 3 days     Minutes of Exercise per Session: 30 min   Stress: No Stress Concern Present (12/25/2023)    [employer] - Occupational Stress Questionnaire     Feeling of Stress : Not at all   Housing Stability: Low Risk  (12/25/2023)    Housing Stability Vital Sign     Unable to Pay for Housing in the Last Year: No     Number of Places Lived in the Last Year: 1     Unstable Housing in the Last Year: No       Exam     Vitals:     10/08/24 0704   BP: 112/76   Pulse: 70   Resp: 18   Temp: 98 °F (36.7 °C)      Physical Exam:  General: She is not in acute distress. She is not ill-appearing.   HENT: Normocephalic and atraumatic. Moist mucous membranes.  Eyes: Conjunctivae normal.   Pulmonary: Pulmonary effort is normal.   Skin: Skin is warm and dry. No rashes.   Psychiatric: Mood normal.        Neurologic Exam   Mental status: oriented to person, place, and time  Attention: Normal. Concentration: normal.  Speech: speech is normal.  Cranial Nerves: EOMI intact, V1-V3 Facial sensation intact. Symmetric facies. Hearing grossly intact. Palate and uvula midline, symmetric. No tongue deviation. Trapezius strength intact.     Motor exam: bulk and tone normal. Strength 5/5 in bilateral upper extremities: deltoids, biceps, triceps, wrist flexion/extension, finger abduction/adduction. Strength 5/5 in bilateral lower extremities: hip flexion/extension, thigh adduction/abduction, knee flexion/extension, dorsiflexion/plantarflexion, foot eversion/inversion.    Reflexes: 2+ in bilateral upper extremities: biceps and brachiaradialis, 2+ in bilateral lower extremities: patellar and achilles    Sensory exam: light touch intact    Gait exam: normal  Romberg: negative  Coordination: normal   Tremor: none    Labs and Imaging     Labs: reviewed  2024TSH wnl  A1C 4.9      Imaging: reviewed  02/2020 MRI brain w wo contrast personally reviewed  - no acute intracranial processes    Assessment and Plan     Problem List Items Addressed This Visit          Neuro    Hemifacial spasm    Overview     From Bell's palsy         Migraine without aura, intractable, without status migrainosus - Primary    Relevant Medications    digital therapeutic,UMA device (NERIVIO DIGITAL DENISE, MIGRAINE,) Misc    atogepant (QULIPTA) 60 mg Tab    ubrogepant (UBRELVY) 100 mg tablet       This is a 53 year old pleasant female w hx of menstrual migraines s/p partial hysterectomy and  hypothyroidism (controlled). Following for intractable migraines that were daily, was on sumatriptan for abortive with minimal relief. For prevention - Patient sleeps well so elavil would not be a good option for her. Has failed topiramate.     Quilepta and magnesium 400mg daily for migraine prevention, ubrelvy for breakthrough. Patient to track how often she is taking ubrelvy (3 breakthroughs in the last month). Doing well.  Medically necessary to continue current regimen given significant improvement in her migraines.    For her hemifacial spasms, she received botox every 3 months w movement disorder specialist. Last received July 2023. Discussed w patient could have been triggered and stress. Thought to be from bells palsy as noted by movement disorders specialist. Patient denied having significant facial droop.     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 Okay for return in 1 year.     Follow-up: 1 year, can return sooner if further issues arise or further questions.  Appreciate opportunity to care for her.

## 2024-10-08 NOTE — TELEPHONE ENCOUNTER
No care due was identified.  [facility] Embedded Care Due Messages. Reference number: [id number].   10/08/2024 6:31:53 PM CDT

## 2024-10-09 ENCOUNTER — PATIENT MESSAGE (OUTPATIENT)
Dept: NEUROLOGY | Facility: CLINIC | Age: 54
End: 2024-10-09
Payer: COMMERCIAL

## 2024-10-09 DIAGNOSIS — G43.019 MIGRAINE WITHOUT AURA, INTRACTABLE, WITHOUT STATUS MIGRAINOSUS: ICD-10-CM

## 2024-10-09 RX ORDER — ATOGEPANT 60 MG/1
60 TABLET ORAL DAILY
Qty: 30 TABLET | Refills: 11 | Status: SHIPPED | OUTPATIENT
Start: 2024-10-09 | End: 2025-10-09

## 2024-10-09 RX ORDER — DIGITAL THERAPEUTIC,REN DEVICE
1 MISCELLANEOUS MISCELLANEOUS
Qty: 1 EACH | Refills: 12 | Status: SHIPPED | OUTPATIENT
Start: 2024-10-09 | End: 2025-04-07

## 2024-10-10 RX ORDER — LEVOTHYROXINE SODIUM 137 UG/1
137 TABLET ORAL
Qty: 30 TABLET | Refills: 0 | Status: SHIPPED | OUTPATIENT
Start: 2024-10-10

## 2024-10-10 NOTE — TELEPHONE ENCOUNTER
Refill Routing Note   Medication(s) are not appropriate for processing by [facility] for the following reason(s):        Patient not seen by provider within 15 months  Required labs abnormal    [facility] action(s):  Defer             Appointments  past 12m or future 3m with PCP    Date Provider   Last Visit   12/23/2022 Brock Womack MD   Next Visit   12/31/2024 Brock Womack MD   ED visits in past 90 days: 0        Note composed:11:37 AM 10/10/2024

## 2024-11-02 DIAGNOSIS — E03.9 ACQUIRED HYPOTHYROIDISM: ICD-10-CM

## 2024-11-02 NOTE — TELEPHONE ENCOUNTER
No care due was identified.  [facility] Embedded Care Due Messages. Reference number: [id number].   11/02/2024 10:27:30 AM CDT

## 2024-11-04 RX ORDER — LEVOTHYROXINE SODIUM 137 UG/1
137 TABLET ORAL
Qty: 30 TABLET | Refills: 0 | OUTPATIENT
Start: 2024-11-04

## 2024-11-15 DIAGNOSIS — E03.9 ACQUIRED HYPOTHYROIDISM: ICD-10-CM

## 2024-11-15 NOTE — TELEPHONE ENCOUNTER
No care due was identified.  [facility] Embedded Care Due Messages. Reference number: [id number].   11/15/2024 5:12:25 AM CST

## 2024-11-19 RX ORDER — LEVOTHYROXINE SODIUM 137 UG/1
137 TABLET ORAL
Qty: 30 TABLET | Refills: 0 | Status: SHIPPED | OUTPATIENT
Start: 2024-11-19

## 2024-11-24 ENCOUNTER — OFFICE VISIT (OUTPATIENT)
Dept: URGENT CARE | Facility: CLINIC | Age: 54
End: 2024-11-24
Payer: COMMERCIAL

## 2024-11-24 VITALS
SYSTOLIC BLOOD PRESSURE: 127 MMHG | TEMPERATURE: 98 F | DIASTOLIC BLOOD PRESSURE: 83 MMHG | HEIGHT: 63 IN | OXYGEN SATURATION: 98 % | WEIGHT: 125 LBS | RESPIRATION RATE: 18 BRPM | HEART RATE: 61 BPM | BODY MASS INDEX: 22.15 KG/M2

## 2024-11-24 DIAGNOSIS — R21 FACIAL RASH: Primary | ICD-10-CM

## 2024-11-24 PROCEDURE — 99213 OFFICE O/P EST LOW 20 MIN: CPT | Mod: 25,S$GLB,,

## 2024-11-24 PROCEDURE — 96372 THER/PROPH/DIAG INJ SC/IM: CPT | Mod: S$GLB,,, | Performed by: FAMILY MEDICINE

## 2024-11-24 RX ORDER — DEXAMETHASONE SODIUM PHOSPHATE 10 MG/ML
10 INJECTION INTRAMUSCULAR; INTRAVENOUS ONCE
Status: COMPLETED | OUTPATIENT
Start: 2024-11-24 | End: 2024-11-24

## 2024-11-24 RX ORDER — DEXAMETHASONE SODIUM PHOSPHATE 10 MG/ML
10 INJECTION INTRAMUSCULAR; INTRAVENOUS ONCE
Status: DISCONTINUED | OUTPATIENT
Start: 2024-11-24 | End: 2024-11-24

## 2024-11-24 RX ADMIN — DEXAMETHASONE SODIUM PHOSPHATE 10 MG: 10 INJECTION INTRAMUSCULAR; INTRAVENOUS at 09:11

## 2024-11-24 NOTE — PROGRESS NOTES
"Subjective:      Patient ID: Magalis Rosales is a 53 y.o. female.    Vitals:  height is 5' 3" (1.6 m) and weight is 56.7 kg (125 lb). Her oral temperature is 98.4 °F (36.9 °C). Her blood pressure is 127/83 and her pulse is 61. Her respiration is 18 and oxygen saturation is 98%.     Chief Complaint: Rash    Pt is here for rash that onset a week ago. Pt states this is recurrent and pain is 6/10. She has tried cerave and hydrocortisone, no relief,. She states symptoms of burning, itching and swelling. No new soaps, lotions, detergents. No new foods. Patient does wear contacts. No changes to vision. No purulent discharge out of eyes. Denies any recent URI. Has recurrent skin related things that she sees derm for.         Rash  This is a recurrent problem. The current episode started in the past 7 days. The problem has been gradually worsening since onset. The affected locations include the right eye and left eye. The rash is characterized by burning, itchiness and swelling. It is unknown if there was an exposure to a precipitant. Treatments tried: cerave, hydrocortisone. The treatment provided no relief. Her past medical history is significant for eczema.       Constitution: Negative.   HENT: Negative.     Neck: neck negative.   Cardiovascular: Negative.    Eyes: Negative.    Respiratory: Negative.     Gastrointestinal: Negative.    Endocrine: negative.   Genitourinary: Negative.    Musculoskeletal: Negative.    Skin:  Positive for rash. Negative for laceration and bruising.   Hematologic/Lymphatic: Negative.    Psychiatric/Behavioral: Negative.        Objective:     Physical Exam   Constitutional: She is oriented to person, place, and time.   HENT:   Head: Normocephalic and atraumatic.   Ears:   Right Ear: Tympanic membrane, external ear and ear canal normal.   Left Ear: Tympanic membrane, external ear and ear canal normal.   Nose: Nose normal. No congestion.   Mouth/Throat: Mucous membranes are moist. No posterior " oropharyngeal erythema.   Eyes: Conjunctivae are normal. Pupils are equal, round, and reactive to light. Right eye exhibits no discharge and no exudate. Left eye exhibits no discharge and no exudate. Right conjunctiva is not injected. Right conjunctiva has no hemorrhage. Left conjunctiva is not injected. Left conjunctiva has no hemorrhage. Right eye exhibits normal extraocular motion and no nystagmus. Left eye exhibits normal extraocular motion and no nystagmus. Extraocular movement intact   Cardiovascular: Normal rate, regular rhythm and normal heart sounds.   Pulmonary/Chest: Breath sounds normal. No stridor. No respiratory distress. She has no wheezes. She has no rales.   Abdominal: Normal appearance. Soft.   Musculoskeletal: Normal range of motion.         General: Normal range of motion.   Neurological: She is alert and oriented to person, place, and time.   Skin: Skin is warm, dry and rash (irritation present to face below both eyes, no signs of spreading, no drainage, no oozing, mild swelling to site).   Psychiatric: Her behavior is normal. Mood normal.       Assessment:     1. Facial rash        Plan:       Facial rash  -     Discontinue: dexAMETHasone injection 10 mg  -     dexAMETHasone injection 10 mg      Patient Instructions   - You must understand that you have received an Urgent Care treatment only and that you may be released before all of your medical problems are known or treated.   - You, the patient, will arrange for follow up care as instructed.   - If your condition worsens or fails to improve we recommend that you receive another evaluation at the ER immediately or contact your PCP to discuss your concerns or return here.  -please continue to use mild soaps  -monitor for increasing signs of swelling or irritation.  -follow up with dermatology.  -please drink plenty fluids.  -please get plenty of rest.  -cool compresses 3-4 times daily for 15 minutes at a time.     You received a steroid shot  today - this can elevate your blood pressure, elevate your blood sugar, water weight gain, nervous energy, redness to the face and dimpling of the skin where the shot goes in.

## 2024-11-24 NOTE — PATIENT INSTRUCTIONS
- You must understand that you have received an Urgent Care treatment only and that you may be released before all of your medical problems are known or treated.   - You, the patient, will arrange for follow up care as instructed.   - If your condition worsens or fails to improve we recommend that you receive another evaluation at the ER immediately or contact your PCP to discuss your concerns or return here.  -please continue to use mild soaps  -monitor for increasing signs of swelling or irritation.  -follow up with dermatology.  -please drink plenty fluids.  -please get plenty of rest.  -cool compresses 3-4 times daily for 15 minutes at a time.     You received a steroid shot today - this can elevate your blood pressure, elevate your blood sugar, water weight gain, nervous energy, redness to the face and dimpling of the skin where the shot goes in.

## 2024-12-03 DIAGNOSIS — G43.019 MIGRAINE WITHOUT AURA, INTRACTABLE, WITHOUT STATUS MIGRAINOSUS: ICD-10-CM

## 2024-12-03 RX ORDER — UBROGEPANT 100 MG/1
100 TABLET ORAL
Qty: 16 TABLET | Refills: 11 | Status: SHIPPED | OUTPATIENT
Start: 2024-12-03

## 2024-12-26 ENCOUNTER — LAB VISIT (OUTPATIENT)
Dept: LAB | Facility: HOSPITAL | Age: 54
End: 2024-12-26
Attending: INTERNAL MEDICINE
Payer: COMMERCIAL

## 2024-12-26 DIAGNOSIS — E03.9 ACQUIRED HYPOTHYROIDISM: ICD-10-CM

## 2024-12-26 LAB — TSH SERPL DL<=0.005 MIU/L-ACNC: 3.51 UIU/ML (ref 0.4–4)

## 2024-12-26 PROCEDURE — 84443 ASSAY THYROID STIM HORMONE: CPT | Performed by: INTERNAL MEDICINE

## 2024-12-26 PROCEDURE — 36415 COLL VENOUS BLD VENIPUNCTURE: CPT | Performed by: INTERNAL MEDICINE

## 2024-12-30 DIAGNOSIS — E03.9 ACQUIRED HYPOTHYROIDISM: ICD-10-CM

## 2024-12-30 RX ORDER — LEVOTHYROXINE SODIUM 137 UG/1
137 TABLET ORAL
Qty: 30 TABLET | Refills: 0 | OUTPATIENT
Start: 2024-12-30

## 2024-12-30 NOTE — TELEPHONE ENCOUNTER
No care due was identified.  [facility] Embedded Care Due Messages. Reference number: [id number].   12/30/2024 5:58:34 AM CST

## 2024-12-31 ENCOUNTER — OFFICE VISIT (OUTPATIENT)
Dept: FAMILY MEDICINE | Facility: CLINIC | Age: 54
End: 2024-12-31
Payer: COMMERCIAL

## 2024-12-31 VITALS
HEIGHT: 63 IN | HEART RATE: 50 BPM | OXYGEN SATURATION: 100 % | SYSTOLIC BLOOD PRESSURE: 108 MMHG | BODY MASS INDEX: 22.97 KG/M2 | WEIGHT: 129.63 LBS | TEMPERATURE: 98 F | DIASTOLIC BLOOD PRESSURE: 70 MMHG

## 2024-12-31 DIAGNOSIS — E03.9 ACQUIRED HYPOTHYROIDISM: ICD-10-CM

## 2024-12-31 DIAGNOSIS — Z12.31 ENCOUNTER FOR SCREENING MAMMOGRAM FOR MALIGNANT NEOPLASM OF BREAST: ICD-10-CM

## 2024-12-31 DIAGNOSIS — G43.019 MIGRAINE WITHOUT AURA, INTRACTABLE, WITHOUT STATUS MIGRAINOSUS: ICD-10-CM

## 2024-12-31 DIAGNOSIS — Z00.00 PREVENTATIVE HEALTH CARE: Primary | ICD-10-CM

## 2024-12-31 PROCEDURE — 1159F MED LIST DOCD IN RCRD: CPT | Mod: CPTII,S$GLB,, | Performed by: INTERNAL MEDICINE

## 2024-12-31 PROCEDURE — 3008F BODY MASS INDEX DOCD: CPT | Mod: CPTII,S$GLB,, | Performed by: INTERNAL MEDICINE

## 2024-12-31 PROCEDURE — 3074F SYST BP LT 130 MM HG: CPT | Mod: CPTII,S$GLB,, | Performed by: INTERNAL MEDICINE

## 2024-12-31 PROCEDURE — 3044F HG A1C LEVEL LT 7.0%: CPT | Mod: CPTII,S$GLB,, | Performed by: INTERNAL MEDICINE

## 2024-12-31 PROCEDURE — 99396 PREV VISIT EST AGE 40-64: CPT | Mod: S$GLB,,, | Performed by: INTERNAL MEDICINE

## 2024-12-31 PROCEDURE — 1160F RVW MEDS BY RX/DR IN RCRD: CPT | Mod: CPTII,S$GLB,, | Performed by: INTERNAL MEDICINE

## 2024-12-31 PROCEDURE — 99999 PR PBB SHADOW E&M-EST. PATIENT-LVL IV: CPT | Mod: PBBFAC,,, | Performed by: INTERNAL MEDICINE

## 2024-12-31 PROCEDURE — 3078F DIAST BP <80 MM HG: CPT | Mod: CPTII,S$GLB,, | Performed by: INTERNAL MEDICINE

## 2024-12-31 RX ORDER — LEVOTHYROXINE SODIUM 137 UG/1
137 TABLET ORAL
Qty: 90 TABLET | Refills: 3 | Status: SHIPPED | OUTPATIENT
Start: 2024-12-31

## 2024-12-31 NOTE — PROGRESS NOTES
"Subjective:       Patient ID: Magalis Rosales is a 54 y.o. female.    Chief Complaint: Annual Exam    Well exam    HPI: 53 y/o w/ hypothyroid and migraines presents alone for well exam. Feels well using atogepant with good effect for migraine prophylaxis (using abortive medicaiton < 2x/month) no dysphagia no loose stool or constipatioN (using miralax most days) weight stable recetn TSH at goal    HM : will be due for breast cancer screening in Feb 2025      Review of Systems   Constitutional:  Negative for activity change, appetite change, fatigue, fever and unexpected weight change.   HENT:  Negative for ear pain, rhinorrhea and sore throat.    Eyes:  Negative for discharge and visual disturbance.   Respiratory:  Negative for chest tightness, shortness of breath and wheezing.    Cardiovascular:  Negative for chest pain, palpitations and leg swelling.   Gastrointestinal:  Negative for abdominal pain, constipation and diarrhea.   Endocrine: Negative for cold intolerance and heat intolerance.   Genitourinary:  Negative for dysuria and hematuria.   Musculoskeletal:  Negative for joint swelling and neck stiffness.   Skin:  Negative for rash.   Neurological:  Negative for dizziness, syncope, weakness and headaches.   Psychiatric/Behavioral:  Negative for suicidal ideas.        Objective:     Vitals:    12/31/24 0909   BP: 108/70   BP Location: Right arm   Patient Position: Sitting   Pulse: (!) 50   Temp: 98.2 °F (36.8 °C)   TempSrc: Oral   SpO2: 100%   Weight: 58.8 kg (129 lb 10.1 oz)   Height: 5' 3" (1.6 m)          Physical Exam  Constitutional:       Appearance: She is well-developed.   HENT:      Head: Normocephalic and atraumatic.   Eyes:      Conjunctiva/sclera: Conjunctivae normal.   Cardiovascular:      Rate and Rhythm: Normal rate and regular rhythm.      Heart sounds: No murmur heard.     No friction rub. No gallop.   Pulmonary:      Effort: Pulmonary effort is normal.      Breath sounds: Normal breath sounds. " No wheezing or rales.   Abdominal:      Palpations: Abdomen is soft.      Tenderness: There is no abdominal tenderness. There is no guarding or rebound.   Musculoskeletal:         General: No tenderness. Normal range of motion.      Cervical back: Normal range of motion.      Right lower leg: No edema.      Left lower leg: No edema.   Skin:     General: Skin is warm and dry.   Neurological:      Mental Status: She is alert and oriented to person, place, and time.      Cranial Nerves: No cranial nerve deficit.         Assessment and Plan   1. Preventative health care (Primary)  Wear seatbelts at all times    Don't drink and drive    Wear bike helmet and other personal protective equipment when appropriate            2. Acquired hypothyroidism  Tsh at goal continue current lt4 dose  - levothyroxine (SYNTHROID) 137 MCG Tab tablet; Take 1 tablet (137 mcg total) by mouth daily before breakfast.  Dispense: 90 tablet; Refill: 3    3. Migraine without aura, intractable, without status migrainosus  Management per neuro current therapy is effective    4. Encounter for screening mammogram for malignant neoplasm of breast  Mammogram in Feb 2025  - Mammo Digital Screening Bilat w/ [facility]; Future

## 2025-02-14 ENCOUNTER — HOSPITAL ENCOUNTER (OUTPATIENT)
Dept: RADIOLOGY | Facility: HOSPITAL | Age: 55
Discharge: HOME | End: 2025-02-14
Attending: INTERNAL MEDICINE
Payer: COMMERCIAL

## 2025-02-14 DIAGNOSIS — Z12.31 ENCOUNTER FOR SCREENING MAMMOGRAM FOR MALIGNANT NEOPLASM OF BREAST: ICD-10-CM

## 2025-02-14 PROCEDURE — 77067 SCR MAMMO BI INCL CAD: CPT | Mod: 26,,, | Performed by: RADIOLOGY

## 2025-02-14 PROCEDURE — 77063 BREAST TOMOSYNTHESIS BI: CPT | Mod: 26,,, | Performed by: RADIOLOGY

## 2025-02-14 PROCEDURE — 77067 SCR MAMMO BI INCL CAD: CPT | Mod: TC

## (undated) DEVICE — PAD SANITARY OB STERILE

## (undated) DEVICE — TROCAR KII FIOS 11MM X 100MM

## (undated) DEVICE — BLADE SURG CARBON STEEL SZ11

## (undated) DEVICE — CANISTER SUCTION 2 LTR

## (undated) DEVICE — KIT ANTIFOG

## (undated) DEVICE — MANIPULATOR VCARE PLUS 34MM

## (undated) DEVICE — SYR ONLY LUER LOCK 20CC

## (undated) DEVICE — DISSECTOR CURVED 5DCD

## (undated) DEVICE — TIP RUMI BLUE DISPOSABLE 5/BX

## (undated) DEVICE — TUBE AQUILEX OUTFLOW

## (undated) DEVICE — PAD PREP 50/CA

## (undated) DEVICE — CATH SELF-CATH FEMALE 14FR 6IN

## (undated) DEVICE — DRESSING ADH ISLAND 2.5 X 3

## (undated) DEVICE — OCCLUDER COLPO-PNEUMO STERILE

## (undated) DEVICE — SEE L#120831

## (undated) DEVICE — SEE MEDLINE ITEM 157181

## (undated) DEVICE — ADHESIVE DERMABOND MINI HV

## (undated) DEVICE — ENDOSTITCH INSTRUMENT

## (undated) DEVICE — GLOVE SURGICAL LATEX SZ 6

## (undated) DEVICE — DEVICE MYOSURE REACH

## (undated) DEVICE — PACK LAPAROSCOPY/PELVISCOPY II

## (undated) DEVICE — GLOVE BIOGEL PI MICRO SZ 7

## (undated) DEVICE — SYR 50CC LL

## (undated) DEVICE — SUT VICRYL PLUS ANTIBACT

## (undated) DEVICE — SOL NS 1000CC

## (undated) DEVICE — Device

## (undated) DEVICE — SEE MEDLINE ITEM 157110

## (undated) DEVICE — CANAL FINDER DISP 8

## (undated) DEVICE — TUBE AQUILEX INFLOW

## (undated) DEVICE — DRAPE STERI LONG

## (undated) DEVICE — SEE MEDLINE ITEM 156946

## (undated) DEVICE — TROCAR KII FIOS 5MM X 100MM

## (undated) DEVICE — KIT PROCEDURE STER INLET CLOSU

## (undated) DEVICE — SEE MEDLINE ITEM 154981

## (undated) DEVICE — CURETTE UTERINE EXPLORA 3MMM O

## (undated) DEVICE — BLANKET UPPER BODY 78.7X29.9IN

## (undated) DEVICE — TRAY FOLEY 16FR INFECTION CONT

## (undated) DEVICE — SYR 10CC LUER LOCK

## (undated) DEVICE — SEE MEDLINE ITEM 152622

## (undated) DEVICE — SEE MEDLINE ITEM 152487

## (undated) DEVICE — UNDERGLOVE BIOGEL PI SZ 6.5 LF

## (undated) DEVICE — SEALER LIGASURE LAP 37CM 5MM

## (undated) DEVICE — ELECTRODE REM PLYHSV RETURN 9

## (undated) DEVICE — COVER OVERHEAD SURG LT BLUE

## (undated) DEVICE — TIP RUMI WHITE DISP UMW676

## (undated) DEVICE — SEE MEDLINE ITEM 146292

## (undated) DEVICE — SUT VICRYL+ 27 UR-6 VIOL

## (undated) DEVICE — POSITIONER HEAD ADULT

## (undated) DEVICE — MAT QUICK 40X30 FLOOR FLUID LF

## (undated) DEVICE — SEE MEDLINE ITEM 146313

## (undated) DEVICE — CAP SELF SEAL GYNEOCLOGY F4

## (undated) DEVICE — NDL INSUF ULTRA VERESS 120MM

## (undated) DEVICE — SOL IRR NACL .9% 3000ML

## (undated) DEVICE — IRRIGATOR ENDOSCOPY DISP.

## (undated) DEVICE — JELLY LUBRICANT STERILE 4 OZ

## (undated) DEVICE — TUBING INSUFFLATION 10

## (undated) DEVICE — HOOK DISSECTING 5MM

## (undated) DEVICE — SUT VLOC 180 ABSORB ESTITCH

## (undated) DEVICE — APPLICATOR CHLORAPREP ORN 26ML

## (undated) DEVICE — DRESSING TELFA N ADH 3X8

## (undated) DEVICE — SUT MONOCRYL 4.0 PS2 CP496G